# Patient Record
Sex: MALE | Race: WHITE | NOT HISPANIC OR LATINO | Employment: OTHER | URBAN - METROPOLITAN AREA
[De-identification: names, ages, dates, MRNs, and addresses within clinical notes are randomized per-mention and may not be internally consistent; named-entity substitution may affect disease eponyms.]

---

## 2017-05-04 ENCOUNTER — OFFICE VISIT (OUTPATIENT)
Dept: FAMILY MEDICINE | Facility: CLINIC | Age: 82
End: 2017-05-04
Payer: MEDICARE

## 2017-05-04 VITALS
SYSTOLIC BLOOD PRESSURE: 160 MMHG | HEART RATE: 73 BPM | TEMPERATURE: 98.1 F | WEIGHT: 166 LBS | BODY MASS INDEX: 26.06 KG/M2 | DIASTOLIC BLOOD PRESSURE: 81 MMHG | OXYGEN SATURATION: 97 % | HEIGHT: 67 IN

## 2017-05-04 DIAGNOSIS — R29.6 RECURRENT FALLS: ICD-10-CM

## 2017-05-04 DIAGNOSIS — Z00.00 ENCOUNTER FOR ROUTINE ADULT HEALTH EXAMINATION WITHOUT ABNORMAL FINDINGS: ICD-10-CM

## 2017-05-04 DIAGNOSIS — J31.0 CHRONIC RHINITIS: ICD-10-CM

## 2017-05-04 DIAGNOSIS — I10 ESSENTIAL HYPERTENSION, BENIGN: Primary | ICD-10-CM

## 2017-05-04 LAB
BASOPHILS # BLD AUTO: 0 10E9/L (ref 0–0.2)
BASOPHILS NFR BLD AUTO: 0.5 %
DIFFERENTIAL METHOD BLD: ABNORMAL
EOSINOPHIL # BLD AUTO: 0.1 10E9/L (ref 0–0.7)
EOSINOPHIL NFR BLD AUTO: 0.9 %
ERYTHROCYTE [DISTWIDTH] IN BLOOD BY AUTOMATED COUNT: 13.8 % (ref 10–15)
HCT VFR BLD AUTO: 37.3 % (ref 40–53)
HGB BLD-MCNC: 12 G/DL (ref 13.3–17.7)
LYMPHOCYTES # BLD AUTO: 1.4 10E9/L (ref 0.8–5.3)
LYMPHOCYTES NFR BLD AUTO: 24.2 %
MCH RBC QN AUTO: 29.4 PG (ref 26.5–33)
MCHC RBC AUTO-ENTMCNC: 32.2 G/DL (ref 31.5–36.5)
MCV RBC AUTO: 91 FL (ref 78–100)
MONOCYTES # BLD AUTO: 0.7 10E9/L (ref 0–1.3)
MONOCYTES NFR BLD AUTO: 11.9 %
NEUTROPHILS # BLD AUTO: 3.6 10E9/L (ref 1.6–8.3)
NEUTROPHILS NFR BLD AUTO: 62.5 %
PLATELET # BLD AUTO: 249 10E9/L (ref 150–450)
RBC # BLD AUTO: 4.08 10E12/L (ref 4.4–5.9)
WBC # BLD AUTO: 5.7 10E9/L (ref 4–11)

## 2017-05-04 PROCEDURE — 85025 COMPLETE CBC W/AUTO DIFF WBC: CPT | Performed by: PREVENTIVE MEDICINE

## 2017-05-04 PROCEDURE — G0439 PPPS, SUBSEQ VISIT: HCPCS | Performed by: PREVENTIVE MEDICINE

## 2017-05-04 PROCEDURE — 80053 COMPREHEN METABOLIC PANEL: CPT | Performed by: PREVENTIVE MEDICINE

## 2017-05-04 PROCEDURE — 36415 COLL VENOUS BLD VENIPUNCTURE: CPT | Performed by: PREVENTIVE MEDICINE

## 2017-05-04 RX ORDER — CHOLECALCIFEROL (VITAMIN D3) 50 MCG
2000 TABLET ORAL DAILY
Qty: 100 TABLET | Refills: 3
Start: 2017-05-04 | End: 2022-01-01

## 2017-05-04 RX ORDER — FLUTICASONE PROPIONATE 50 MCG
2 SPRAY, SUSPENSION (ML) NASAL DAILY
Qty: 1 BOTTLE | Refills: 1 | Status: SHIPPED | OUTPATIENT
Start: 2017-05-04 | End: 2018-06-28

## 2017-05-04 NOTE — PROGRESS NOTES
SUBJECTIVE:                                                            Ernie Leary is a 89 year old male who presents for Preventive Visit.      Are you in the first 12 months of your Medicare Part B coverage?  No    Healthy Habits:    Do you get at least three servings of calcium containing foods daily (dairy, green leafy vegetables, etc.)? no, taking calcium and/or vitamin D supplement: yes -     Amount of exercise or daily activities, outside of work: 5 day(s) per week    Problems taking medications regularly Yes , has trouble swallowing    Medication side effects: No    Have you had an eye exam in the past two years? yes    Do you see a dentist twice per year? yes    Do you have sleep apnea, excessive snoring or daytime drowsiness?no    COGNITIVE SCREEN  1) Repeat 3 items (Banana, Sunrise, Chair)    2) Clock draw: NORMAL  3) 3 item recall: Recalls 3 objects  Results: NORMAL clock, 1-2 items recalled: COGNITIVE IMPAIRMENT LESS LIKELY    Mini-CogTM Copyright S Luke. Licensed by the author for use in [facility]; reprinted with permission (glenroy@[facility]). All rights reserved.                Reviewed and updated as needed this visit by clinical staff         Reviewed and updated as needed this visit by Provider        Social History   Substance Use Topics     Smoking status: Former Smoker     Packs/day: 2.00     Years: 30.00     Types: Cigarettes     Quit date: 1/1/1981     Smokeless tobacco: Never Used     Alcohol use No       The patient does not drink >3 drinks per day nor >7 drinks per week.    Today's PHQ-2 Score:   PHQ-2 ( 1999 Pf[employer]) 5/14/2015 4/30/2015   Q1: Little interest or pleasure in doing things 0 0   Q2: Feeling down, depressed or hopeless 0 0   PHQ-2 Score 0 0       Do you feel safe in your environment - Yes    Do you have a Health Care Directive?: Yes: Advance Directive has been received and scanned.    Current providers sharing in care for this patient include:   Patient Care  "Team:  Jacob Herr MD as PCP - General (Internal Medicine)  Norma Mcbride MD as MD (Internal Medicine)      Hearing impairment: Yes, Difficulty following a conversation in a noisy restaurant or crowded room.    Ability to successfully perform activities of daily living: Yes, no assistance needed     Fall risk:       Home safety:  none identified  Cassie Shipley MA      The following health maintenance items are reviewed in Epic and correct as of today:  Health Maintenance   Topic Date Due     LIPID MONITORING Q1 YEAR( NO INBASKET)  08/15/2017     INFLUENZA VACCINE (SYSTEM ASSIGNED)  09/01/2017     FALL RISK ASSESSMENT  09/19/2017     ADVANCE DIRECTIVE PLANNING Q5 YRS (NO INBASKET)  03/29/2018     TETANUS Q10 YR  08/23/2022     PNEUMO 5YR BOOST DUE AFTER AGE 65 (NO IB MSG)  Completed     PNEUMOCOCCAL  Completed              ROS:  Constitutional, HEENT, cardiovascular, pulmonary, GI, , musculoskeletal, neuro, skin, endocrine and psych systems are negative, except as otherwise noted.    Problem list, Medication list, Allergies, and Medical/Social/Surgical histories reviewed in [facility] and updated as appropriate.  OBJECTIVE:                                                            There were no vitals taken for this visit. Estimated body mass index is 25.97 kg/(m^2) as calculated from the following:    Height as of 9/19/16: 5' 7\" (1.702 m).    Weight as of 9/19/16: 165 lb 12.8 oz (75.2 kg).  EXAM:   GENERAL: healthy, alert and no distress  EYES: Eyes grossly normal to inspection, PERRL and conjunctivae and sclerae normal  HENT: ear canals and TM's normal, nose and mouth without ulcers or lesions  NECK: no adenopathy, no asymmetry, masses, or scars and thyroid normal to palpation  RESP: lungs clear to auscultation - no rales, rhonchi or wheezes  CV: regular rate and rhythm, normal S1 S2, no S3 or S4, no murmur, click or rub, no peripheral edema and peripheral pulses strong  ABDOMEN: soft, " "nontender, no hepatosplenomegaly, no masses and bowel sounds normal  MS: no gross musculoskeletal defects noted, no edema  SKIN: no suspicious lesions or rashes  NEURO: Normal strength and tone, mentation intact and speech normal  PSYCH: mentation appears normal, affect normal/bright    ASSESSMENT / PLAN:                                                            1. Essential hypertension, benign      2. Encounter for routine adult health examination without abnormal findings    - CBC with platelets and differential  - Comprehensive metabolic panel    3. Recurrent falls    - Cholecalciferol (VITAMIN D) 2000 UNITS tablet; Take 2,000 Units by mouth daily  Dispense: 100 tablet; Refill: 3    4. Chronic rhinitis    - fluticasone (FLONASE) 50 MCG/ACT spray; Spray 2 sprays into both nostrils daily  Dispense: 1 Bottle; Refill: 1    End of Life Planning:  Patient currently has an advanced directive: Yes.  Practitioner is supportive of decision.    COUNSELING:  Reviewed preventive health counseling, as reflected in patient instructions       Regular exercise       Healthy diet/nutrition       Vision screening       Hearing screening       Dental care       Safe sex practices/STD prevention       Hepatitis C screening       HIV screening for high risk patient       Consider lung cancer screening for ages 55-80 years and 30 pack-year smoking history        Colon cancer screening       Prostate cancer screening        Estimated body mass index is 25.97 kg/(m^2) as calculated from the following:    Height as of 9/19/16: 5' 7\" (1.702 m).    Weight as of 9/19/16: 165 lb 12.8 oz (75.2 kg).     reports that he quit smoking about 36 years ago. His smoking use included Cigarettes. He has a 60.00 pack-year smoking history. He has never used smokeless tobacco.      Appropriate preventive services were discussed with this patient, including applicable screening as appropriate for cardiovascular disease, diabetes, osteopenia/osteoporosis, " and glaucoma.  As appropriate for age/gender, discussed screening for colorectal cancer, prostate cancer, breast cancer, and cervical cancer. Checklist reviewing preventive services available has been given to the patient.    Reviewed patients plan of care and provided an AVS. The Basic Care Plan (routine screening as documented in Health Maintenance) for Ernie meets the Care Plan requirement. This Care Plan has been established and reviewed with the Patient.    Counseling Resources:  ATP IV Guidelines  Pooled Cohorts Equation Calculator  Breast Cancer Risk Calculator  FRAX Risk Assessment  ICSI Preventive Guidelines  Dietary Guidelines for Americans, 2010  USDA's MyPlate  ASA Prophylaxis  Lung CA Screening    Jacob Herr MD, MD  [facility]

## 2017-05-04 NOTE — MR AVS SNAPSHOT
After Visit Summary   5/4/2017    Ernie Leary    MRN: [id number]           Patient Information     Date Of Birth          9/23/1924        Visit Information        Provider Department      5/4/2017 1:30 PM Jacob Herr MD [facility] Instructions      Preventive Health Recommendations:       Male Ages 65 and over    Yearly exam:             See your health care provider every year in order to  o   Review health changes.   o   Discuss preventive care.    o   Review your medicines if your doctor has prescribed any.    Talk with your health care provider about whether you should have a test to screen for prostate cancer (PSA).    Every 3 years, have a diabetes test (fasting glucose). If you are at risk for diabetes, you should have this test more often.    Every 5 years, have a cholesterol test. Have this test more often if you are at risk for high cholesterol or heart disease.     Every 10 years, have a colonoscopy. Or, have a yearly FIT test (stool test). These exams will check for colon cancer.    Talk to with your health care provider about screening for Abdominal Aortic Aneurysm if you have a family history of AAA or have a history of smoking.  Shots:     Get a flu shot each year.     Get a tetanus shot every 10 years.     Talk to your doctor about your pneumonia vaccines. There are now two you should receive - Pneumovax (PPSV 23) and Prevnar (PCV 13).    Talk to your doctor about a shingles vaccine.     Talk to your doctor about the hepatitis B vaccine.  Nutrition:     Eat at least 5 servings of fruits and vegetables each day.     Eat whole-grain bread, whole-wheat pasta and brown rice instead of white grains and rice.     Talk to your doctor about Calcium and Vitamin D.   Lifestyle    Exercise for at least 150 minutes a week (30 minutes a day, 5 days a week). This will help you control your weight and prevent disease.     Limit alcohol to one drink per  "day.     No smoking.     Wear sunscreen to prevent skin cancer.     See your dentist every six months for an exam and cleaning.     See your eye doctor every 1 to 2 years to screen for conditions such as glaucoma, macular degeneration and cataracts.        Follow-ups after your visit        Who to contact     If you have questions or need follow up information about today's clinic visit or your schedule please contact [facility] directly at [phone].  Normal or non-critical lab and imaging results will be communicated to you by [employer], letter or phone within 4 business days after the clinic has received the results. If you do not hear from us within 7 days, please contact the clinic through [employer] or phone. If you have a critical or abnormal lab result, we will notify you by phone as soon as possible.  Submit refill requests through [employer] or call your pharmacy and they will forward the refill request to us. Please allow 3 business days for your refill to be completed.          Additional Information About Your Visit        Compiere Information     [employer]iere gives you secure access to your electronic health record. If you see a primary care provider, you can also send messages to your care team and make appointments. If you have questions, please call your primary care clinic.  If you do not have a primary care provider, please call [phone] and they will assist you.        Care EveryWhere ID     This is your Care EveryWhere ID. This could be used by other organizations to access your [facility] medical records  [id number]        Your Vitals Were     Pulse Temperature Height Pulse Oximetry BMI (Body Mass Index)       73 98.1  F (36.7  C) (Tympanic) 5' 7\" (1.702 m) 97% 26 kg/m2        Blood Pressure from Last 3 Encounters:   05/04/17 160/81   09/19/16 131/73   05/29/15 119/71    Weight from Last 3 Encounters:   05/04/17 166 lb (75.3 kg)   09/19/16 165 lb 12.8 oz (75.2 kg)   05/29/15 167 lb (75.8 " kg)              Today, you had the following     No orders found for display       Primary Care Provider Office Phone # Fax #    Jacob Santa Isai Herr [phone]       [facility] [address]  [facility] [address]        Thank you!     Thank you for choosing [facility]  for your care. Our goal is always to provide you with excellent care. Hearing back from our patients is one way we can continue to improve our services. Please take a few minutes to complete the written survey that you may receive in the mail after your visit with us. Thank you!             Your Updated Medication List - Protect others around you: Learn how to safely use, store and throw away your medicines at www.disposemymeds.org.          This list is accurate as of: 5/4/17  1:47 PM.  Always use your most recent med list.                   Brand Name Dispense Instructions for use    amLODIPine 2.5 MG tablet    NORVASC    180 tablet    Take 1 tablet (2.5 mg) by mouth 2 times daily       calcium 600 MG tablet     180 tablet    Take 1 tablet by mouth 2 times daily.       calcium carbonate 500 MG chewable tablet    TUMS     Take 1 chew tab by mouth daily as needed.       CYANOCOBALAMIN PO      Take 1,000 mcg by mouth daily.       fosinopril 40 MG tablet    MONOPRIL    135 tablet    take 1/2 tablet by mouth in the morning and 1 tab in the evening       magnesium hydroxide 400 MG/5ML suspension    MILK OF MAGNESIA     Take 30 mLs by mouth daily as needed. Constipation.       metoprolol 50 MG tablet    LOPRESSOR    90 tablet    Take one-half tablet by mouth twice daily       prune juice Liqd      Take  by mouth daily (with breakfast).       psyllium 58.6 % Powd    METAMUCIL     Take 1 teaspoonful by mouth 3 times daily as needed. Constipation.       VITAMIN D3 PO      Take 1,000 Units by mouth daily.

## 2017-05-04 NOTE — NURSING NOTE
"No chief complaint on file.      Initial /81 (BP Location: Right arm, Cuff Size: Adult Regular)  Pulse 73  Temp 98.1  F (36.7  C) (Tympanic)  Ht 5' 7\" (1.702 m)  Wt 166 lb (75.3 kg)  SpO2 97%  BMI 26 kg/m2 Estimated body mass index is 26 kg/(m^2) as calculated from the following:    Height as of this encounter: 5' 7\" (1.702 m).    Weight as of this encounter: 166 lb (75.3 kg).  Medication Reconciliation: complete     Cassei Shipley MA    "

## 2017-05-05 LAB
ALBUMIN SERPL-MCNC: 4.1 G/DL (ref 3.4–5)
ALP SERPL-CCNC: 72 U/L (ref 40–150)
ALT SERPL W P-5'-P-CCNC: 19 U/L (ref 0–70)
ANION GAP SERPL CALCULATED.3IONS-SCNC: 8 MMOL/L (ref 3–14)
AST SERPL W P-5'-P-CCNC: 17 U/L (ref 0–45)
BILIRUB SERPL-MCNC: 0.5 MG/DL (ref 0.2–1.3)
BUN SERPL-MCNC: 24 MG/DL (ref 7–30)
CALCIUM SERPL-MCNC: 9.2 MG/DL (ref 8.5–10.1)
CHLORIDE SERPL-SCNC: 103 MMOL/L (ref 94–109)
CO2 SERPL-SCNC: 26 MMOL/L (ref 20–32)
CREAT SERPL-MCNC: 1.21 MG/DL (ref 0.66–1.25)
GFR SERPL CREATININE-BSD FRML MDRD: 56 ML/MIN/1.7M2
GLUCOSE SERPL-MCNC: 157 MG/DL (ref 70–99)
POTASSIUM SERPL-SCNC: 4.5 MMOL/L (ref 3.4–5.3)
PROT SERPL-MCNC: 7 G/DL (ref 6.8–8.8)
SODIUM SERPL-SCNC: 137 MMOL/L (ref 133–144)

## 2017-06-15 ENCOUNTER — TELEPHONE (OUTPATIENT)
Dept: FAMILY MEDICINE | Facility: CLINIC | Age: 82
End: 2017-06-15

## 2017-06-15 NOTE — TELEPHONE ENCOUNTER
Reason for Call:  Other appointment    Detailed comments: Needs preop on 07/21/17 is establishing care with Dr Ashraf in   September (Dr. Herr pt).  Can we work in on the 21st?  Surgery is 07/24/17    Phone Number Patient can be reached at: Home number on file [phone] (home)    Best Time: anytime    Can we leave a detailed message on this number? YES    Call taken on 6/15/2017 at 2:50 PM by Elena Carrillo

## 2017-06-26 ENCOUNTER — APPOINTMENT (OUTPATIENT)
Dept: MRI IMAGING | Facility: CLINIC | Age: 82
End: 2017-06-26
Attending: EMERGENCY MEDICINE
Payer: MEDICARE

## 2017-06-26 ENCOUNTER — APPOINTMENT (OUTPATIENT)
Dept: ULTRASOUND IMAGING | Facility: CLINIC | Age: 82
End: 2017-06-26
Attending: PSYCHIATRY & NEUROLOGY
Payer: MEDICARE

## 2017-06-26 ENCOUNTER — HOSPITAL ENCOUNTER (OUTPATIENT)
Facility: CLINIC | Age: 82
Setting detail: OBSERVATION
Discharge: HOME | End: 2017-06-27
Attending: EMERGENCY MEDICINE | Admitting: INTERNAL MEDICINE
Payer: MEDICARE

## 2017-06-26 DIAGNOSIS — Z91.81 RISK FOR FALLS: ICD-10-CM

## 2017-06-26 DIAGNOSIS — R20.2 ARM PARESTHESIA, LEFT: ICD-10-CM

## 2017-06-26 DIAGNOSIS — R25.1 SHAKINESS: ICD-10-CM

## 2017-06-26 DIAGNOSIS — Z86.79 HISTORY OF INTRACRANIAL HEMORRHAGE: ICD-10-CM

## 2017-06-26 DIAGNOSIS — I68.0 CEREBRAL AMYLOID ANGIOPATHY (H): ICD-10-CM

## 2017-06-26 DIAGNOSIS — G40.909 SEIZURE DISORDER (H): Primary | ICD-10-CM

## 2017-06-26 DIAGNOSIS — E85.4 CEREBRAL AMYLOID ANGIOPATHY (H): ICD-10-CM

## 2017-06-26 PROBLEM — G45.9 TIA (TRANSIENT ISCHEMIC ATTACK): Status: ACTIVE | Noted: 2017-06-26

## 2017-06-26 LAB
ALBUMIN SERPL-MCNC: 3.7 G/DL (ref 3.4–5)
ALP SERPL-CCNC: 63 U/L (ref 40–150)
ALT SERPL W P-5'-P-CCNC: 18 U/L (ref 0–70)
ANION GAP SERPL CALCULATED.3IONS-SCNC: 9 MMOL/L (ref 3–14)
AST SERPL W P-5'-P-CCNC: 15 U/L (ref 0–45)
BASOPHILS # BLD AUTO: 0.1 10E9/L (ref 0–0.2)
BASOPHILS NFR BLD AUTO: 1.1 %
BILIRUB SERPL-MCNC: 0.6 MG/DL (ref 0.2–1.3)
BUN SERPL-MCNC: 15 MG/DL (ref 7–30)
CALCIUM SERPL-MCNC: 8.6 MG/DL (ref 8.5–10.1)
CHLORIDE SERPL-SCNC: 101 MMOL/L (ref 94–109)
CHOLEST SERPL-MCNC: 177 MG/DL
CO2 SERPL-SCNC: 25 MMOL/L (ref 20–32)
CREAT SERPL-MCNC: 0.95 MG/DL (ref 0.66–1.25)
DIFFERENTIAL METHOD BLD: ABNORMAL
EOSINOPHIL # BLD AUTO: 0 10E9/L (ref 0–0.7)
EOSINOPHIL NFR BLD AUTO: 0.9 %
ERYTHROCYTE [DISTWIDTH] IN BLOOD BY AUTOMATED COUNT: 13.7 % (ref 10–15)
GFR SERPL CREATININE-BSD FRML MDRD: 74 ML/MIN/1.7M2
GLUCOSE BLDC GLUCOMTR-MCNC: 129 MG/DL (ref 70–99)
GLUCOSE BLDC GLUCOMTR-MCNC: 187 MG/DL (ref 70–99)
GLUCOSE SERPL-MCNC: 168 MG/DL (ref 70–99)
HBA1C MFR BLD: 6.5 % (ref 4.3–6)
HCT VFR BLD AUTO: 37.9 % (ref 40–53)
HDLC SERPL-MCNC: 42 MG/DL
HGB BLD-MCNC: 12.7 G/DL (ref 13.3–17.7)
IMM GRANULOCYTES # BLD: 0 10E9/L (ref 0–0.4)
IMM GRANULOCYTES NFR BLD: 0.2 %
INTERPRETATION ECG - MUSE: NORMAL
LDLC SERPL CALC-MCNC: 123 MG/DL
LYMPHOCYTES # BLD AUTO: 1 10E9/L (ref 0.8–5.3)
LYMPHOCYTES NFR BLD AUTO: 20.7 %
MCH RBC QN AUTO: 29.8 PG (ref 26.5–33)
MCHC RBC AUTO-ENTMCNC: 33.5 G/DL (ref 31.5–36.5)
MCV RBC AUTO: 89 FL (ref 78–100)
MONOCYTES # BLD AUTO: 0.5 10E9/L (ref 0–1.3)
MONOCYTES NFR BLD AUTO: 11.1 %
NEUTROPHILS # BLD AUTO: 3.1 10E9/L (ref 1.6–8.3)
NEUTROPHILS NFR BLD AUTO: 66 %
NONHDLC SERPL-MCNC: 135 MG/DL
NRBC # BLD AUTO: 0 10*3/UL
NRBC BLD AUTO-RTO: 0 /100
PLATELET # BLD AUTO: 207 10E9/L (ref 150–450)
POTASSIUM SERPL-SCNC: 3.7 MMOL/L (ref 3.4–5.3)
PROT SERPL-MCNC: 6.7 G/DL (ref 6.8–8.8)
RBC # BLD AUTO: 4.26 10E12/L (ref 4.4–5.9)
SODIUM SERPL-SCNC: 135 MMOL/L (ref 133–144)
TRIGL SERPL-MCNC: 58 MG/DL
TSH SERPL DL<=0.005 MIU/L-ACNC: 1.18 MU/L (ref 0.4–4)
WBC # BLD AUTO: 4.7 10E9/L (ref 4–11)

## 2017-06-26 PROCEDURE — 85025 COMPLETE CBC W/AUTO DIFF WBC: CPT | Performed by: EMERGENCY MEDICINE

## 2017-06-26 PROCEDURE — 80061 LIPID PANEL: CPT | Performed by: EMERGENCY MEDICINE

## 2017-06-26 PROCEDURE — A9585 GADOBUTROL INJECTION: HCPCS | Performed by: EMERGENCY MEDICINE

## 2017-06-26 PROCEDURE — 70553 MRI BRAIN STEM W/O & W/DYE: CPT

## 2017-06-26 PROCEDURE — 80053 COMPREHEN METABOLIC PANEL: CPT | Performed by: EMERGENCY MEDICINE

## 2017-06-26 PROCEDURE — 93005 ELECTROCARDIOGRAM TRACING: CPT

## 2017-06-26 PROCEDURE — 25000132 ZZH RX MED GY IP 250 OP 250 PS 637: Mod: GY | Performed by: PHYSICIAN ASSISTANT

## 2017-06-26 PROCEDURE — 00000146 ZZHCL STATISTIC GLUCOSE BY METER IP

## 2017-06-26 PROCEDURE — 25000132 ZZH RX MED GY IP 250 OP 250 PS 637: Mod: GY | Performed by: PSYCHIATRY & NEUROLOGY

## 2017-06-26 PROCEDURE — A9270 NON-COVERED ITEM OR SERVICE: HCPCS | Mod: GY | Performed by: PHYSICIAN ASSISTANT

## 2017-06-26 PROCEDURE — 83036 HEMOGLOBIN GLYCOSYLATED A1C: CPT | Performed by: EMERGENCY MEDICINE

## 2017-06-26 PROCEDURE — G0378 HOSPITAL OBSERVATION PER HR: HCPCS

## 2017-06-26 PROCEDURE — 25000132 ZZH RX MED GY IP 250 OP 250 PS 637: Mod: GY | Performed by: EMERGENCY MEDICINE

## 2017-06-26 PROCEDURE — 99220 ZZC INITIAL OBSERVATION CARE,LEVL III: CPT | Performed by: INTERNAL MEDICINE

## 2017-06-26 PROCEDURE — A9270 NON-COVERED ITEM OR SERVICE: HCPCS | Mod: GY | Performed by: PSYCHIATRY & NEUROLOGY

## 2017-06-26 PROCEDURE — 82306 VITAMIN D 25 HYDROXY: CPT | Performed by: EMERGENCY MEDICINE

## 2017-06-26 PROCEDURE — 25000128 H RX IP 250 OP 636: Performed by: EMERGENCY MEDICINE

## 2017-06-26 PROCEDURE — 99207 ZZC APP CREDIT; MD BILLING SHARED VISIT: CPT | Performed by: PHYSICIAN ASSISTANT

## 2017-06-26 PROCEDURE — A9270 NON-COVERED ITEM OR SERVICE: HCPCS | Mod: GY | Performed by: EMERGENCY MEDICINE

## 2017-06-26 PROCEDURE — 99285 EMERGENCY DEPT VISIT HI MDM: CPT | Mod: 25

## 2017-06-26 PROCEDURE — 84443 ASSAY THYROID STIM HORMONE: CPT | Performed by: EMERGENCY MEDICINE

## 2017-06-26 PROCEDURE — 93880 EXTRACRANIAL BILAT STUDY: CPT

## 2017-06-26 RX ORDER — POLYETHYLENE GLYCOL 3350 17 G/17G
17 POWDER, FOR SOLUTION ORAL DAILY PRN
Status: DISCONTINUED | OUTPATIENT
Start: 2017-06-26 | End: 2017-06-27 | Stop reason: HOSPADM

## 2017-06-26 RX ORDER — BISACODYL 10 MG
10 SUPPOSITORY, RECTAL RECTAL DAILY PRN
Status: DISCONTINUED | OUTPATIENT
Start: 2017-06-26 | End: 2017-06-27 | Stop reason: HOSPADM

## 2017-06-26 RX ORDER — NALOXONE HYDROCHLORIDE 0.4 MG/ML
.1-.4 INJECTION, SOLUTION INTRAMUSCULAR; INTRAVENOUS; SUBCUTANEOUS
Status: DISCONTINUED | OUTPATIENT
Start: 2017-06-26 | End: 2017-06-27 | Stop reason: HOSPADM

## 2017-06-26 RX ORDER — ONDANSETRON 4 MG/1
4 TABLET, ORALLY DISINTEGRATING ORAL EVERY 6 HOURS PRN
Status: DISCONTINUED | OUTPATIENT
Start: 2017-06-26 | End: 2017-06-27 | Stop reason: HOSPADM

## 2017-06-26 RX ORDER — PROCHLORPERAZINE MALEATE 5 MG
5 TABLET ORAL EVERY 6 HOURS PRN
Status: DISCONTINUED | OUTPATIENT
Start: 2017-06-26 | End: 2017-06-27 | Stop reason: HOSPADM

## 2017-06-26 RX ORDER — HYDRALAZINE HYDROCHLORIDE 20 MG/ML
10 INJECTION INTRAMUSCULAR; INTRAVENOUS EVERY 4 HOURS PRN
Status: DISCONTINUED | OUTPATIENT
Start: 2017-06-26 | End: 2017-06-27 | Stop reason: HOSPADM

## 2017-06-26 RX ORDER — METOPROLOL TARTRATE 50 MG
50 TABLET ORAL 2 TIMES DAILY
Status: DISCONTINUED | OUTPATIENT
Start: 2017-06-26 | End: 2017-06-26 | Stop reason: CLARIF

## 2017-06-26 RX ORDER — PROCHLORPERAZINE 25 MG
12.5 SUPPOSITORY, RECTAL RECTAL EVERY 12 HOURS PRN
Status: DISCONTINUED | OUTPATIENT
Start: 2017-06-26 | End: 2017-06-27 | Stop reason: HOSPADM

## 2017-06-26 RX ORDER — FLUTICASONE PROPIONATE 50 MCG
2 SPRAY, SUSPENSION (ML) NASAL DAILY
Status: DISCONTINUED | OUTPATIENT
Start: 2017-06-26 | End: 2017-06-27 | Stop reason: HOSPADM

## 2017-06-26 RX ORDER — FOSINOPRIL SODIUM 10 MG/1
40 TABLET ORAL EVERY EVENING
Status: DISCONTINUED | OUTPATIENT
Start: 2017-06-26 | End: 2017-06-27 | Stop reason: HOSPADM

## 2017-06-26 RX ORDER — GADOBUTROL 604.72 MG/ML
7 INJECTION INTRAVENOUS ONCE
Status: COMPLETED | OUTPATIENT
Start: 2017-06-26 | End: 2017-06-26

## 2017-06-26 RX ORDER — AMOXICILLIN 250 MG
1-2 CAPSULE ORAL 2 TIMES DAILY PRN
Status: DISCONTINUED | OUTPATIENT
Start: 2017-06-26 | End: 2017-06-27 | Stop reason: HOSPADM

## 2017-06-26 RX ORDER — LEVETIRACETAM 500 MG/1
500 TABLET ORAL ONCE
Status: COMPLETED | OUTPATIENT
Start: 2017-06-26 | End: 2017-06-26

## 2017-06-26 RX ORDER — AMLODIPINE BESYLATE 2.5 MG/1
2.5 TABLET ORAL 2 TIMES DAILY
Status: DISCONTINUED | OUTPATIENT
Start: 2017-06-26 | End: 2017-06-27 | Stop reason: HOSPADM

## 2017-06-26 RX ORDER — LEVETIRACETAM 500 MG/1
500 TABLET ORAL 2 TIMES DAILY
Status: DISCONTINUED | OUTPATIENT
Start: 2017-06-26 | End: 2017-06-27 | Stop reason: HOSPADM

## 2017-06-26 RX ORDER — FOSINOPRIL SODIUM 10 MG/1
20 TABLET ORAL EVERY MORNING
Status: DISCONTINUED | OUTPATIENT
Start: 2017-06-27 | End: 2017-06-27 | Stop reason: HOSPADM

## 2017-06-26 RX ORDER — LEVETIRACETAM 500 MG/1
500 TABLET ORAL 2 TIMES DAILY
Qty: 60 TABLET | Refills: 0 | Status: SHIPPED | OUTPATIENT
Start: 2017-06-26 | End: 2017-06-27

## 2017-06-26 RX ORDER — OMEGA-3-ACID ETHYL ESTERS 1 G/1
1 CAPSULE, LIQUID FILLED ORAL DAILY
COMMUNITY
End: 2018-06-28

## 2017-06-26 RX ORDER — UBIDECARENONE 30 MG
100 CAPSULE ORAL
COMMUNITY
End: 2022-01-01

## 2017-06-26 RX ORDER — METOPROLOL TARTRATE 25 MG/1
25 TABLET, FILM COATED ORAL 2 TIMES DAILY
Status: DISCONTINUED | OUTPATIENT
Start: 2017-06-26 | End: 2017-06-27 | Stop reason: HOSPADM

## 2017-06-26 RX ORDER — ONDANSETRON 2 MG/ML
4 INJECTION INTRAMUSCULAR; INTRAVENOUS EVERY 6 HOURS PRN
Status: DISCONTINUED | OUTPATIENT
Start: 2017-06-26 | End: 2017-06-27 | Stop reason: HOSPADM

## 2017-06-26 RX ADMIN — METOPROLOL TARTRATE 50 MG: 50 TABLET, FILM COATED ORAL at 20:42

## 2017-06-26 RX ADMIN — AMLODIPINE BESYLATE 2.5 MG: 2.5 TABLET ORAL at 20:42

## 2017-06-26 RX ADMIN — LEVETIRACETAM 500 MG: 500 TABLET, FILM COATED ORAL at 20:42

## 2017-06-26 RX ADMIN — LEVETIRACETAM 500 MG: 500 TABLET, FILM COATED ORAL at 13:10

## 2017-06-26 RX ADMIN — GADOBUTROL 7 ML: 604.72 INJECTION INTRAVENOUS at 10:35

## 2017-06-26 RX ADMIN — FLUTICASONE PROPIONATE 2 SPRAY: 50 SPRAY, METERED NASAL at 20:42

## 2017-06-26 ASSESSMENT — VISUAL ACUITY
OU: NORMAL ACUITY
OU: NORMAL ACUITY

## 2017-06-26 NOTE — ED PROVIDER NOTES
History     Chief Complaint:  Left sided numbness and tingling    HPI   History is limited, as the patient is a poor historian. Supplemented by EPIC chart review.    Ernie Leary is a 89 year old male with a history of hypertension, atrial flutter, cerebral amyloid angiopathy and left sided hemorraghic stroke a few years ago who presents to the ED by ambulance for evaluation of left sided numbness and tingling.The patient reports he woke up this morning at 5am and his left hand felt crampy and a bit numb/tingling. The patient complains that the numbness is still present here in the ED but is greatly improved.  He states he had some left leg numbness as well, but that has resolved here in the ED.  He denies headache, confusion or visual changes. The patient's wife expresses concern for TIA or stroke.  The patient sees Dr. Alcantar and the patient's wife mentions he had a surveillance MRI within the past month.  The patient is not anticoagulated in any way and has had no recent trauma.    Allergies:  Adrenalin  Hctz     Medications:    Vitamin D  Flonase  Norvasc  Monopril  Lopressor  Vitamin D3  Cyanocobalamin  Metamucil  Milk of Magnesia  Tums  Calcium 600    Past Medical History:    Cerebral amyloid angiopathy  Left sided hemorraghic stroke (2014)   Hypertension  Atrial flutter  Diabetes  Sebaceous cyst  Syncope and collapse  Hyperlipidemia  Normocytic anemia  TIA  BPH    Past Surgical History:    Cardiac ablation  Herniorrhaphy inguinal  Cystoscopy  Arthrotomy shoulder  Skin biopsies    Family History:    No past pertinent family history.    Social History:  The patient was brought in by EMS and accompanied to the ED by his wife.  Smoking Status: Former- 2 packs daily for 30 yrs (quit 1981)  Smokeless Tobacco: Never  Alcohol Use: No  Marital Status:        Review of Systems   All other systems reviewed and are negative.    Physical Exam   First Vitals:  BP: 182/89  Temp: 98F (36.7C) Oral  Pulse: 77  HR:  77  Resp: 18    Physical Exam  General: male semi-recumbent in bed, wife at bedside  HENT: mucous membranes moist  CV: regular rate, regular rhythm, no LE edema, normal perfusion in extremities  Resp: clear throughout, normal effort  GI: abdomen soft and nontender, no guarding  MSK: no bony tenderness  Skin: appropriately warm and dry  Neuro: awake, alert, clear speech, fully oriented, face symmetric, CN 2-12 intact,  normal, finger-nose normal bilaterally, strength and sensation intact in all extr, no meningismus, ambulatory without ataxia, NIH SS = 0      Psych: calm, cooperative      Emergency Department Course   ECG done at 0909. ECG read at 0922. Indication: Numbness  Rate 71 bpm. VA interval 254. QRS duration 84. QT/QTc 386/419. P-R-T axes 53 10 8.  Sinus rhythm with 1st degree AV block.  Otherwise normal ECG.    Imaging:  Radiographic findings were communicated with the patient who voiced understanding of the findings.    MR Brain w/o & w Contrast:  IMPRESSION:   1. Abnormal enhancement in the right parietal lobe where there was a  previous hematoma in 2012. 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 Possible causes  include a vascular malformation such as cavernous hemangioma and  neoplasm. Follow-up exam in one to two months is advised.  2. 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  3. Brain atrophy and white matter changes consistent with sequelae of  small vessel ischemic disease.  Preliminary result, per Radiology    Laboratory:  CBC: WBC 4.7, HGB 12.7(L),   CMP: Glucose 168(H) o/w WNL (Creat 0.95)    Interventions:  1003 Gadavist injection 7mL  Keppra 500mg PO    Emergency Department Course:  Nursing notes and vitals reviewed.  I performed an exam of the patient as documented above.     1115 I rechecked the patient. He feels normal. I explained I am waiting for a neurology phone  "call.     The patient passed a \"road test\" prior to discharge from the ED.    1141 I spoke with Dr. Duff in Neurology regarding the patient.  She recommends initiation of Keppra 500mg bid.    1242 I rechecked the patient.    Findings and plan explained to the Patient who consents to admission. Discussed the patient with Dr. Barboza, who will admit the patient to a Neurology bed for further monitoring, evaluation, and treatment.    Impression & Plan    Medical Decision Making:  Ernie Leary is a 89 year old male who presents with transient neurologic symptoms in his left extremities, most marked in his left hand.  His symptoms were steadily improving upon arrival and have now resolved.  For this reason, I did not call a code stroke.  He has no objective acute deficits at this time.  MRI was performed and shows no new intracranial hemorrhage or ischemia. I did discuss his presentation and imaging findings with the on call Neurologist, Dr. Duff, who recommended reinitiation of Keppra of 500mg BID for the likelihood of partial seizures as the cause of his symptoms. A TIA also remains on the differential. No signs of contributory electrolyte abnormalities. At one point, the patient was actually set up for discharge home, but upon standing again having previously successful road tested in the ED, he felt shaky on both sides of his body and felt uneasy about going home.  I went back to reexamine him and discussed the situation with the patient and his wife.  His exam was unchanged.  He will be observed in the hospital overnight and I spoke with Dr. Jaki Barboza who graciously agreed to accept the patient after discussion of the above.    Diagnosis:  1. (R20.2) Arm paresthesia, left    2. (Z86.79) History of intracranial hemorrhage    3. (R25.1) Shakiness    4. (E85.4,  I68.0) Cerebral amyloid angiopathy (H)    Disposition:  The patient was admitted to the hospital.    6/26/2017    EMERGENCY DEPARTMENT    I, " Yvrose Quezada, am serving as a scribe at 0855 on June 26, 2017 to document services personally performed by Dr. Marie based on my observations and the provider's statements to me.         Louis Marie MD  06/26/17 1085

## 2017-06-26 NOTE — ED NOTES
Bed: ED03  Expected date:   Expected time:   Means of arrival:   Comments:  Jane 1 - 89 M stroke symptoms eta 0879

## 2017-06-26 NOTE — H&P
"DATE OF ADMISSION:  06/26/2017      PRIMARY CARE PHYSICIAN:  Jacob Herr MD      CHIEF COMPLAINT:  Left upper extremity cramping and shaking.      History obtained primarily from patient, although he is a poor historian; he is accompanied by family.  His wife just left to go home.  I also reviewed the chart.      HISTORY OF PRESENT ILLNESS:  Ernie Leary is a 89-year-old gentleman with past medical history of hypertension, diet-controlled type 2 diabetes, atrial fibrillation, status post ablation, BPH, SIADH, and history of right parietal intracerebral hemorrhage as well as amyloid angiopathy who presents to the emergency room via EMS earlier today after awakening with left upper extremity shaking and cramping.  The patient notes that he awoke at around 5:00 a.m. and felt that his hand was \"frozen.\"  He states that it began to cramp and was hard to move.  From his prior intracerebral hemorrhage in 11/2012, the patient does have some baseline numbness and tingling in his left upper extremity as well as some baseline weakness on that side; however, he felt that the shaking and the cramping was a new occurrence so he called EMS and was brought to [facility] Emergency Department.      In the ED, the patient was seen and assessed by Dr. Marie who obtained basic labs and imaging which revealed an unremarkable CMP.  Hemoglobin is 12.7.  MRI of the brain showed abnormal enhancement in the right parietal lobe where there was a previous hematoma in 2012.  This indicates persistent breakdown of the blood-brain barrier, but the etiology is nonspecific.  Possible causes include a vascular malformation such as a cavernous hemangioma and neoplasm.  Followup exam in 1-2 months is advised.  Also noted is encephalomalacia gliosis and paramagnetic effect in the right parietal lobe from old hemorrhage, brain atrophy and white matter changes consistent with sequela of small-vessel ischemic disease.  The MRI results and " "the presentation were discussed with Dr. Duff of Neurology who thought this could potentially represent a seizure-like activity as well.  The patient does have a history of seizure disorder with hospitalization in 2013 for further evaluation and treatment.  At that time the patient was initiated on Keppra; however, he took himself off of this and voices a strong desire that this not be restarted.      On my interview, the patient confirms the above history.  He denies any dysphasia, dizziness, lightheadedness, shortness of breath, chest pain, urinary symptoms, diarrhea or constipation.  The patient does endorse some ataxia earlier today but cannot identify one lower extremity being more weaker than the other.  With his prior stroke, he had left hemiparesis and a left visual field cut as well as impaired mobility, but per notes and per his report he regained most of his mobility since that time.  He walks independently without a walker or a cane.  He denies any falls or hitting his head today.  No bowel or bladder incontinence.  Since coming to the ED, he feels like his left upper extremity is less \"frozen\" but it still feels off.  He states that the cramping has improved but he still feels a little numbness.  I am unclear if this is at his baseline or worse than his baseline as he cannot definitively tell me.      REVIEW OF SYSTEMS:  A 10-point review of systems was performed and is otherwise negative unless specified in the HPI.      PAST MEDICAL HISTORY:   1.  Right parietal intracerebral hemorrhage in 11/2012 with subsequent left hemiparesis, left visual field cut and impaired mobility most of which has been regained.   2.  Seizure disorder related to above, not currently on any medications for this as he took himself off, initially diagnosed during hospitalization in 2013.   3.  Atrial fibrillation, status post ablation.   4.  Hypertension.   5.  Hyperlipidemia.   6.  Diet-controlled type 2 diabetes mellitus. "   7.  BPH.   8.  SIADH.      MEDICATIONS:    Prior to Admission Medications   Prescriptions Last Dose Informant Patient Reported? Taking?   CYANOCOBALAMIN PO  Spouse/Significant Other Yes No   Sig: Take 1,000 mcg by mouth daily.   Cholecalciferol (VITAMIN D) 2000 UNITS tablet   No No   Sig: Take 2,000 Units by mouth daily   Cholecalciferol (VITAMIN D3 PO)  Spouse/Significant Other Yes No   Sig: Take 1,000 Units by mouth daily.   amLODIPine (NORVASC) 2.5 MG tablet   No No   Sig: Take 1 tablet (2.5 mg) by mouth 2 times daily   calcium 600 MG tablet  Spouse/Significant Other Yes No   Sig: Take 1 tablet by mouth 2 times daily.   calcium carbonate (TUMS) 500 MG chewable tablet  Spouse/Significant Other Yes No   Sig: Take 1 chew tab by mouth daily as needed.   fluticasone (FLONASE) 50 MCG/ACT spray   No No   Sig: Spray 2 sprays into both nostrils daily   fosinopril (MONOPRIL) 40 MG tablet   No No   Sig: take 1/2 tablet by mouth in the morning and 1 tab in the evening   magnesium hydroxide (MILK OF MAGNESIA) 400 MG/5ML suspension  Spouse/Significant Other Yes No   Sig: Take 30 mLs by mouth daily as needed. Constipation.   metoprolol (LOPRESSOR) 50 MG tablet   No No   Sig: Take one-half tablet by mouth twice daily   prune juice LIQD  Spouse/Significant Other Yes No   Sig: Take  by mouth daily (with breakfast).   psyllium (METAMUCIL) 58.6 % POWD  Spouse/Significant Other Yes No   Sig: Take 1 teaspoonful by mouth 3 times daily as needed. Constipation.      Facility-Administered Medications: None      ALLERGIES:  Epinephrine and hydrochlorothiazide.      PAST SURGICAL HISTORY:   Past Surgical History:   Procedure Laterality Date     ------------OTHER-------------  1/1/2008    Cardiac ablation     ADJACENT TISSUE TRANSFER, FOREHEAD/CHEEKS/CHIN/MOUTH/NECK/AXILLAE/GENITALIA/HANDS/FEET, < OR = 10CM       ARTHROTOMY SHOULDER, ROTATOR CUFF REPAIR, COMBINED  11/18/2011    Procedure:COMBINED ARTHROTOMY SHOULDER, ROTATOR CUFF REPAIR;  LEFT SHOULDER OPEN ROTATOR CUFF REPAIR, BURSECTOMY, DISTAL CLAVICLE RESECTION; Surgeon:KHALIDA ZABALA; Location: OR     BIOPSY      multiple skin biopsies for skin cancers     CARDIAC SURGERY       CYSTOSCOPY, TRANSURETHRAL RESECTION (TUR) PROSTATE, COMBINED  8/15/2012    Procedure: COMBINED CYSTOSCOPY, TRANSURETHRAL RESECTION (TUR) PROSTATE;  CYSTOSCOPY, TRANSURETHRAL RESECTION  OF  PROSTATE WITH PLASMA LOOP;  Surgeon: Harmeet Kilgore MD;  Location:  OR     HERNIORRHAPHY INGUINAL Right 5/29/2015    Procedure: HERNIORRHAPHY INGUINAL;  Surgeon: Scottie Nunn MD;  Location:  [facility]      FAMILY HISTORY:  Mother with history of CVA at 99.  Father with MI at 86.      SOCIAL HISTORY:  The patient currently resides at a senior co-op, the [facility].  He recently moved there with his wife.  He has a 60-pack-year smoking history.  No alcohol use.  He does not use a cane or walker for ambulatory assistance.      LABORATORY DATA:  Reviewed per Epic, noted in the HPI.      IMAGING:  Noted in the HPI.      PHYSICAL EXAMINATION:   VITAL SIGNS:  T 98, P 61, /58, R 14, SpO2 of 94% on room air.   CONSTITUTIONAL:  The patient lying in bed, dressed in hospital garb.  Appears comfortable.  Cooperative with interview, speech is slowed.     HEENT:  Normocephalic, atraumatic.  Pupils equal, round and reactive to light.  Negative for conjunctival redness or scleral icterus.  Oral mucosa pink and moist; negative for ulcerations, erythema or exudates.   CARDIOVASCULAR:  Regular rate and rhythm; no murmurs, rubs or extra heart sounds appreciated.  Pulses +2/4 and regular in upper and lower extremities bilaterally.   RESPIRATORY:  No increased work of breathing.  No supplemental oxygen at this time.   LUNGS:  Clear to auscultation bilaterally; no wheezes, rales or rhonchi appreciated.   GASTROINTESTINAL:  Abdomen soft, nondistended.  Bowel sounds auscultated in all 4 quadrants.  Negative for tenderness to palpation.    MUSCULOSKELETAL:  No gross deformities noted.  Normal muscle tone.  Strength +4/5 in left upper extremity, 5/5 in right upper extremity.  Patient right-handed.   LYMPHATICS:  Negative for lower extremity edema bilaterally.   NEUROLOGIC:   strength intact.  Alert and oriented to person, place and time.  Cranial nerves 2-12 grossly intact.  Cerebellar function intact per rapid hand movement testing.  Sensation slightly diminished in left upper extremity compared to right upper extremity which patient reports is his baseline.   SKIN:  Warm, dry, intact.  No jaundice noted.  Negative for suspicious lesions, rashes, bruising, open sores or abrasions.      ASSESSMENT AND PLAN:  Ernie Leary is a 89-year-old gentleman with past medical history of right parietal intracerebral hemorrhage, amyloid angiopathy, seizure disorder, atrial fibrillation, status post ablation, not on anticoagulation, hyperlipidemia, diet-controlled type 2 diabetes mellitus, and hypertension who presents to the Emergency Department with left upper extremity cramping, stiffness, and shaking with concern for cerebrovascular accident.  MRI unremarkable for acute process at this time.  Admitted under observation to the neuro floor for further evaluation and treatment of possible transient ischemic attack  versus seizure-like activity.  Patient slightly hypertensive; however, has not taken PTA medications yet today.  Patient currently stable.     Left upper extremity shaking, cramping, and stiffness:  Patient with a very similar presentation and admission in 2013.  He states that he awoke at around 5:00 a.m. and felt that his left hand specifically was frozen and he had a cramping sensation and stiffness.  He also felt that his hand was shaking and had more profound numbness.  He has some baseline numbness in his left upper extremity from his prior intracerebral hemorrhage.  This persisted, which prompted him to call EMS.  Workup thus far showed an  unremarkable CMP and CBC.  MRI of the brain showed an abnormal enhancement in the right parietal lobe where there was a previous hematoma in 2012, which indicates persistent breakdown of the blood-brain barrier, but the etiology is nonspecific.  Possible causes include a vascular malformation such as cavernous hemangioma and neoplasm.  Followup exam in 1-2 months is advised.  Also, encephalomalacia gliosis and paramagnetic effect in the right parietal lobe from old hemorrhage.  Patient with history of amyloid angiopathy and right parietal intracerebral hemorrhage in 2012 with associated left hemiparesis, left visual field cut, and impaired mobility which has since been regained.  He also felt some increasing ataxia earlier today as well.  He has been diagnosed with a seizure disorder in the past relative to his prior intracerebral hemorrhage and was previously on Keppra, but took himself off of this and feels strongly about not restarting if there is no active evidence of a seizure.  In the ED, the case was presented to Dr. Duff of Neurology who had recommended reinitiation of Keppra 500 mg b.i.d. and discharge with followup with Neurology in the outpatient setting; however, the patient was unable to perform a roadside test and thus admission was sought.   -- Admit under observation to the neuro floor.   -- Neurology consulted; appreciate assistance.   -- Neuro checks per protocol.   -- Monitor on telemetry.   -- PT and OT to assess.   -- Patient ate a full sandwich and some orange juice in the Emergency Department prior to my interview without any issues with dysphasia, difficulties swallowing, or concern for aspiration by nursing staff, so we will therefore defer formal SLP consult.   -- CBC and BMP in the a.m.     Hypertension:  We will resume PTA amlodipine, Monopril, and metoprolol with parameters in place once verified by pharmacy.     Diet-controlled type 2 diabetes mellitus:  Patient not currently on any  medications.  The last A1c was from .  We will obtain an A1c at this time.     Hyperlipidemia:  Patient not maintained on a statin, defer further management to primary care provider.     Atrial fibrillation, status post ablation:  Patient not on anticoagulation at this time.  No active chest pain, shortness of breath or palpitations.  No recent syncopal events.   -- Monitor on telemetry.     Deep venous thrombosis prophylaxis:  Encourage ambulation.      CODE STATUS:  At this time patient will be listed as a full code, which is consistent with prior hospitalizations; however, we will have to revisit this conversation when patient's wife is present as the patient is not comfortable talking about this without her.  Per review of prior POLST filled out in , he had listed do not resuscitate, do not intubate.  He would like to elect full code until we can discuss with his wife.      This patient was seen and assessed with Dr. Li who agrees with the plan as outlined above.         CALEB LI MD       As dictated by OTILIO HUNG PA-C            D: 2017 14:10   T: 2017 15:11   MT: JAIRO      Name:     ALEX MCALLISTER   MRN:      [id number]        Account:      [id number]   :      1924           Admitted:     [id number]      Document: [id number]       cc: Jacob Herr MD

## 2017-06-26 NOTE — ED AVS SNAPSHOT
Emergency Department    [address] [facility] [address]    Phone:  [phone]    Fax:  [phone]                                       Ernie Leary   MRN: [id number]    Department:   Emergency Department   Date of Visit:  6/26/2017           After Visit Summary Signature Page     I have received my discharge instructions, and my questions have been answered. I have discussed any challenges I see with this plan with the nurse or doctor.    ..........................................................................................................................................  Patient/Patient Representative Signature      ..........................................................................................................................................  Patient Representative Print Name and Relationship to Patient    ..................................................               ................................................  Date                                            Time    ..........................................................................................................................................  Reviewed by Signature/Title    ...................................................              ..............................................  Date                                                            Time

## 2017-06-26 NOTE — CONSULTS
NEUROLOGY CONSULTATION      REASON FOR CONSULTATION:  Transient left upper extremity tightness, possible seizure.      HISTORY OF PRESENT ILLNESS:  Mr. Ernie Leary is a very pleasant, 89-year-old right-handed gentleman.  He had a right intracerebral hemorrhage in 2012 which left him with some left-sided weakness and mild cognitive changes.  He has had a very good recovery, although still has always been aware of some mild residual changes in terms of a little weakness and incoordination on his left side.  In 2013, he was admitted with an episode of left-sided tingling.  I had suspected possible seizure.  EEG was not conclusive.  He chose not to be on Keppra, although he had been on it prophylactically following his bleed.  This morning after he got up, he was aware of some left-sided hand cramping.  He describes his fingers actually clenching and feeling stiff and it was hard to unclench them.  It did not really affect his face or leg, although he did feel more unsteady with his walking on it on a transient basis.  There was no headache, no visual change, no chest pain or breathing difficulty.  Symptoms basically resolved in the Emergency Room.      He denies any recent cold or flu-like illnesses.  No significant recent medication changes.      PAST MEDICAL HISTORY:  He does have a history of a left cerebral hemorrhage suspicious for amyloid angiopathy.  Other medical problems include hypertension, atrial flutter, diabetes, hyperlipidemia, chronic anemia, BPH.      HOME MEDICATIONS:  Include vitamins, Flonase, Norvasc, Monopril, Lopressor, vitamin D, B12, Tums, calcium and some other vitamins and supplements.      ALLERGIES:  He has had adverse reactions to epinephrine and hydrochlorothiazide.      SOCIAL HISTORY:  He is a former smoker, but quit in 1981.  No alcohol use.  He lives at home with his wife.      REVIEW OF SYSTEMS:  Pertinent comprehensive system review is as noted above.      PHYSICAL EXAMINATION:  "  GENERAL:  He is resting comfortably in bed in no acute distress.   VITAL SIGNS:  /84, temp 97.5, pulse 77, respirations 14.   NEUROLOGIC:  He is alert and oriented.  His speech is reasonably fluent.  Extraocular movements are full, visual fields are grossly intact.  Fundi are not well seen.  Face, tongue and palate movement are symmetric.  Strength testing of the upper and lower extremities is pretty symmetric today.  Alternating motion rates of his left fingers and toes are equivocally mildly slower on the left side than right.  Sensation to joint position sense and pinprick are symmetric in the extremities.  Deep tendon reflexes are symmetric throughout and no Babinski signs are present.      DIAGNOSTIC DATA:  He did have an MRI of the brain which I reviewed with Dr. Carpio.  It does show his area of encephalomalacia in the right hemisphere.  There is some abnormal enhancement in the right parietal lobe in the area of his previous hematoma.  It was unclear as to the etiology of this.      IMPRESSION AND PLAN:  Transient left-sided neurological symptoms.  It sounds like this was more of a \"positive\" neurological phenomenon rather than negative and seizure would be a strong possibility.  At this point, I do not think we need to repeat an EEG because it is unlikely to change opinion.  I would like to check a carotid ultrasound study just to make sure there is no development of large vessel vascular disease.  We talked about the possibility of seizure and resuming Keppra.  He and his wife are okay getting back on the medicine.  We will go with 500 twice a day.  I did warn him about sedation and mood changes can also develop and need to be watched.  If the medication is overly sedating, we may back down to 250 b.i.d. and build it up.  I also did bring in CDs of brain imaging that he has had through our clinic over the last couple of years.  I did give them to Dr. Carpio and they will be compared to see if " there are any changes or anything of concern that needs to be monitored.  If he is clinically stable, he could potentially be discharged tomorrow.         AMEE CHINCHILLA MD             D: 2017 15:52   T: 2017 16:21   MT: RADHA      Name:     ALEX MCALLISTER   MRN:      [id number]        Account:       [id number]   :      1924           Consult Date:  2017      Document: [id number]

## 2017-06-26 NOTE — IP AVS SNAPSHOT
[address] [facility]    [facility] [address]    [place] [address]    Phone:  [phone]                                       After Visit Summary   6/26/2017    Ernie Leary    MRN: [id number]           After Visit Summary Signature Page     I have received my discharge instructions, and my questions have been answered. I have discussed any challenges I see with this plan with the nurse or doctor.    ..........................................................................................................................................  Patient/Patient Representative Signature      ..........................................................................................................................................  Patient Representative Print Name and Relationship to Patient    ..................................................               ................................................  Date                                            Time    ..........................................................................................................................................  Reviewed by Signature/Title    ...................................................              ..............................................  Date                                                            Time

## 2017-06-26 NOTE — ED NOTES
"Pt was prepared for discharged, had gotten dressed and PIV was removed and pt was attempting to stand from bed and get into the wheelchair and he became weak. Pt was shaking- boths arms and legs but he states his L hand was most tremerous. Pt states he felt too weak to walk and felt \"unwell.\" Pt was assisted back to bed and RN felt pt was not safe to go home and pt/wife agreed. Pt given courtesy meal and MD updated. Pt eating, blood sugar 129 and pt still stating \"i don't feel well. I feel weak and I am shaking.\"  Dr. Marie at bedside and RN. Pt is tearful and states he just does not feel well enough to go home. Plan is to admit pt   "

## 2017-06-26 NOTE — ED NOTES
"[facility]  ED Nurse Handoff Report    ED Chief complaint: One-sided Weakness (Pt reports he awoke this morning and at 0700 he noticed \"I lost control of my left hand.\" Pt reports his left hand was shaking, cramping and he was unable to move it. Pt states it also felt numb/tingling. Pt reports he regained movement/control in hand now but hand still feels a bit numb. Pt reports L leg numbness at the time too. Pt has equal  strength and movement in bilateral arms/legs. Pt denies HA or vision changes)      ED Diagnosis:   Final diagnoses:   Arm paresthesia, left   History of intracranial hemorrhage   Shakiness   Cerebral amyloid angiopathy (H)       Code Status: Hospitalist at bedside    Allergies:   Allergies   Allergen Reactions     Adrenalin [Epinephrine Hcl]      Hctz      hyponatremia       Activity level - Baseline/Home:  Independent    Activity Level - Current:   Stand with Assist     Needed?: No    Isolation: No  Infection: Not Applicable    Bariatric?: No    Vital Signs:   Vitals:    06/26/17 1114 06/26/17 1115 06/26/17 1116 06/26/17 1256   BP: (!) 172/94      Pulse:       Resp:   14    Temp:       TempSrc:       SpO2:  96% 96% 96%   Weight:           Cardiac Rhythm: ,        Pain level: 0-10 Pain Scale: 0    Is this patient confused?: No    Patient Report: Initial Complaint: L hand numbness/tingling and tremerous/spasming   Focused Assessment: Pt presents from apartment where he lives with wife and reports he awoke this morning at 0700, felt okay and then noticed that his L hand was cramping, tremerous and numb. Pt states \"my hand was frozen and I could not move it.\" Pt reported some numbness in his L leg as well but nothing significant. Wife stated pt was unsteady on his feet as well this morning so wife called EMS. By the time EMS had arrived to the scene pt stated his L hand was improved. Pt has hx of hemorrhagic stroke in 2012 and since has had TIAs vs seizures. Pt spent " many years in a rehab facility and now lives a pretty independent life. Neuro exam was normal except for pt stating his L hand did still feel a bit numb which throughout his stay did improve/resolve. Pt had MRI which was negative for stroke. Pt was preparing to be discharged when he was found to be unsteady on his feet, wobbly and nervous about going home. Pt became tearful and stated he was not well enough to go home. Pt states he never uses a cane or a walker but was unsteady when RN was trying to help him stand and then he would just sit right back down. Decision was made to keep pt in the hospital. Wife went home to bring pt's medications in. Pt is a/ox4 and VSS. Pt given 500 mg keppra (which we just restarted today per neuro)   Tests Performed: Blood work, MRI brain  Abnormal Results: Nothing remarkable  Treatments provided: PO keppra    Family Comments: Wife was present, will be returning    OBS brochure/video discussed/provided to patient: Yes    ED Medications:   Medications   gadobutrol (GADAVIST) injection 7 mL (7 mLs Intravenous Given 6/26/17 1035)   sodium chloride (PF) 0.9% PF flush 10 mL (10 mLs Intravenous Given 6/26/17 1035)   levETIRAcetam (KEPPRA) tablet 500 mg (500 mg Oral Given 6/26/17 1310)       Drips infusing?:  No      ED NURSE PHONE NUMBER: *[phone]

## 2017-06-26 NOTE — ED AVS SNAPSHOT
"  Emergency Department    [address] [facility] [address]    Phone:  [phone]    Fax:  [phone]                                       Ernie Leary   MRN: [id number]    Department:   Emergency Department   Date of Visit:  6/26/2017           Patient Information     Date Of Birth          9/23/1924        Your diagnoses for this visit were:     Arm paresthesia, left     History of intracranial hemorrhage        You were seen by Louis Marie MD.      Follow-up Information     Follow up with Tanner Alcantar MD. Schedule an appointment as soon as possible for a visit in 1 week.    Specialty:  Neurology    Why:  for recheck in clinic by Neurologist    Contact information:    [facility]  [address] [id number] [phone]          Follow up with  Emergency Department.    Specialty:  EMERGENCY MEDICINE    Why:  As needed, If symptoms worsen    Contact information:    [address] [facility] [address] [phone]        Discharge Instructions         Paraesthesias  It is unclear what caused the abnormal sensations in your left arm and hand, though it is possible that you had a partial seizure.  The Neurologist recommends that you resume Keppra and follow up soon in clinic for re-evaluation.  Paraesthesia is a burning or prickling sensation that is sometimes felt in the hands, arms, legs or feet. It can also occur in other parts of the body. It can also feel like tingling or numbness, skin crawling, or itching. The feeling is not comfortable, but it is not painful. (The \"pins and needles\" feeling that happens when a foot or hand \"falls asleep\" is a temporary paraesthesia.)  Paraesthesias that last or come and go may be caused by medical issues that need to be treated. These include stroke, a bulging disk pressing on a nerve, a trapped nerve, vitamin deficiencies, or even certain medicines.  Tests are often done. These tests " may include blood tests, X-ray, CT (computerized tomography) scan, or a muscle test (electromyography). Depending on the cause, treatment may include physical therapy.  Home care    Tell the healthcare provider about all medicines you take. This includes prescription and over-the-counter medicines, vitamins, and herbs. Ask if any of the medicines may be causing your problems. Do not make any changes to prescription medicines without talking to your healthcare provider first.    You may be prescribed medicines to help relieve the tingling feeling or for pain. Take all medicines as directed.    A numb hand or foot may be more prone to injury. To help protect it:    Always use oven mitts.    Test water with an unaffected hand or foot.    Use caution when trimming nails. File sharp areas.    Wear shoes that fit well to avoid pressure points, blisters, and ulcers.    Inspect your hands and feet carefully (including the soles of your feet and between your toes) at least once a week. If you see red areas, sores, or other problems, tell your healthcare provider.  Follow-up care  Follow up with your doctor or as advised by our staff. You may need further testing or evaluation.  When to seek medical advice  Call your healthcare provider right away if any of the following occur:    Numbness or weakness of the face, one arm, or one leg    Slurred speech, confusion, trouble speaking, walking, or seeing    Severe headache, fainting spell, dizziness, or seizure    Chest, arm, neck, or upper back pain    Loss of bladder or bowel control    Open wound with redness, swelling, or pus  Date Last Reviewed: 9/25/2015 2000-2017 [employer]. [address] [place] [address], [place] [address]. All rights reserved. This information is not intended as a substitute for professional medical care. Always follow your healthcare professional's instructions.          Future Appointments        Provider Department Dept Phone Center     6/29/2017 9:15 AM KI Dove CNP [facility] [phone]     7/21/2017 11:00 AM Forest Ashraf MD [facility] [phone]     9/6/2017 1:30 PM Forest Ashraf MD [facility] [phone]       24 Hour Appointment Hotline       To make an appointment at any [facility], call [phone] ([phone]). If you don't have a family doctor or clinic, we will help you find one. [facility] (formerly [facility] are conveniently located to serve the needs of you and your family.             Review of your medicines      START taking        Dose / Directions Last dose taken    levETIRAcetam 500 MG tablet   Commonly known as:  KEPPRA   Dose:  500 mg   Quantity:  60 tablet        Take 1 tablet (500 mg) by mouth 2 times daily   Refills:  0          Our records show that you are taking the medicines listed below. If these are incorrect, please call your family doctor or clinic.        Dose / Directions Last dose taken    amLODIPine 2.5 MG tablet   Commonly known as:  NORVASC   Dose:  2.5 mg   Quantity:  180 tablet        Take 1 tablet (2.5 mg) by mouth 2 times daily   Refills:  3        calcium 600 MG tablet   Dose:  1 tablet   Quantity:  180 tablet        Take 1 tablet by mouth 2 times daily.   Refills:  3        calcium carbonate 500 MG chewable tablet   Commonly known as:  TUMS   Dose:  1 chew tab        Take 1 chew tab by mouth daily as needed.   Refills:  0        CYANOCOBALAMIN PO   Dose:  1000 mcg        Take 1,000 mcg by mouth daily.   Refills:  0        fluticasone 50 MCG/ACT spray   Commonly known as:  FLONASE   Dose:  2 spray   Quantity:  1 Bottle        Spray 2 sprays into both nostrils daily   Refills:  1        fosinopril 40 MG tablet   Commonly known as:  MONOPRIL   Quantity:  135 tablet        take 1/2 tablet by mouth in the morning and 1 tab in the evening   Refills:  3        magnesium hydroxide 400 MG/5ML suspension   Commonly known as:  MILK OF MAGNESIA   Dose:   30 mL        Take 30 mLs by mouth daily as needed. Constipation.   Refills:  0        metoprolol 50 MG tablet   Commonly known as:  LOPRESSOR   Quantity:  90 tablet        Take one-half tablet by mouth twice daily   Refills:  3        prune juice Liqd        Take  by mouth daily (with breakfast).   Refills:  0        psyllium 58.6 % Powd   Commonly known as:  METAMUCIL   Dose:  1 teaspoonful        Take 1 teaspoonful by mouth 3 times daily as needed. Constipation.   Refills:  0        * VITAMIN D3 PO   Dose:  1000 Units        Take 1,000 Units by mouth daily.   Refills:  0        * vitamin D 2000 UNITS tablet   Dose:  2000 Units   Quantity:  100 tablet        Take 2,000 Units by mouth daily   Refills:  3        * Notice:  This list has 2 medication(s) that are the same as other medications prescribed for you. Read the directions carefully, and ask your doctor or other care provider to review them with you.            Prescriptions were sent or printed at these locations (1 Prescription)                   Other Prescriptions                Printed at Department/Unit printer (1 of 1)         levETIRAcetam (KEPPRA) 500 MG tablet                Procedures and tests performed during your visit     CBC with platelets differential    Cardiac Continuous Monitoring    Comprehensive metabolic panel    EKG 12-lead, tracing only    MR Brain w/o & w Contrast    Peripheral IV catheter      Orders Needing Specimen Collection     None      Pending Results     No orders found from 6/24/2017 to 6/27/2017.            Pending Culture Results     No orders found from 6/24/2017 to 6/27/2017.            Pending Results Instructions     If you had any lab results that were not finalized at the time of your Discharge, you can call the ED Lab Result RN at [phone]. You will be contacted by this team for any positive Lab results or changes in treatment. The nurses are available 7 days a week from 10A to 6:30P.  You can leave a message 24  hours per day and they will return your call.        Test Results From Your Hospital Stay        6/26/2017  9:14 AM      Component Results     Component Value Ref Range & Units Status    WBC 4.7 4.0 - 11.0 10e9/L Final    RBC Count 4.26 (L) 4.4 - 5.9 10e12/L Final    Hemoglobin 12.7 (L) 13.3 - 17.7 g/dL Final    Hematocrit 37.9 (L) 40.0 - 53.0 % Final    MCV 89 78 - 100 fl Final    MCH 29.8 26.5 - 33.0 pg Final    MCHC 33.5 31.5 - 36.5 g/dL Final    RDW 13.7 10.0 - 15.0 % Final    Platelet Count 207 150 - 450 10e9/L Final    Diff Method Automated Method  Final    % Neutrophils 66.0 % Final    % Lymphocytes 20.7 % Final    % Monocytes 11.1 % Final    % Eosinophils 0.9 % Final    % Basophils 1.1 % Final    % Immature Granulocytes 0.2 % Final    Nucleated RBCs 0 0 /100 Final    Absolute Neutrophil 3.1 1.6 - 8.3 10e9/L Final    Absolute Lymphocytes 1.0 0.8 - 5.3 10e9/L Final    Absolute Monocytes 0.5 0.0 - 1.3 10e9/L Final    Absolute Eosinophils 0.0 0.0 - 0.7 10e9/L Final    Absolute Basophils 0.1 0.0 - 0.2 10e9/L Final    Abs Immature Granulocytes 0.0 0 - 0.4 10e9/L Final    Absolute Nucleated RBC 0.0  Final         6/26/2017  9:33 AM      Component Results     Component Value Ref Range & Units Status    Sodium 135 133 - 144 mmol/L Final    Potassium 3.7 3.4 - 5.3 mmol/L Final    Chloride 101 94 - 109 mmol/L Final    Carbon Dioxide 25 20 - 32 mmol/L Final    Anion Gap 9 3 - 14 mmol/L Final    Glucose 168 (H) 70 - 99 mg/dL Final    Urea Nitrogen 15 7 - 30 mg/dL Final    Creatinine 0.95 0.66 - 1.25 mg/dL Final    GFR Estimate 74 >60 mL/min/1.7m2 Final    Non  GFR Calc    GFR Estimate If Black >90   GFR Calc   >60 mL/min/1.7m2 Final    Calcium 8.6 8.5 - 10.1 mg/dL Final    Bilirubin Total 0.6 0.2 - 1.3 mg/dL Final    Albumin 3.7 3.4 - 5.0 g/dL Final    Protein Total 6.7 (L) 6.8 - 8.8 g/dL Final    Alkaline Phosphatase 63 40 - 150 U/L Final    ALT 18 0 - 70 U/L Final    AST 15 0 - 45 U/L  Final         6/26/2017 11:42 AM      Narrative     MRI BRAIN WITHOUT AND WITH CONTRAST  6/26/2017 10:29 AM    HISTORY:  Transient left arm/leg loss of control, numbness.     TECHNIQUE:  Multiplanar, multisequence MRI of the brain without and  with 7 mL Gadavist.    COMPARISON: CT scans 7/12/2013 and 11/3/2012. MRI 11/5/2012.    FINDINGS:  There is generalized atrophy of the brain. White matter T2  hyperintensities are seen in the cerebral hemispheres consistent with  sequelae of small vessel ischemic disease. Encephalomalacia and  gliosis are seen in the right parietal lobe, unchanged. These are  associated with extensive paramagnetic effect from the previous  hematoma in this area in 2012.  There is no evidence of acute  hemorrhage, mass, acute infarct, or anomaly.  Since the previous exam,  a region of poorly marginated abnormal gadolinium enhancement has  developed where the hemorrhage began, about 1.7 x 1.5 x 1.2 cm  diameter, not visible on the previous MRI.    The facial structures appear normal. The arteries at the base of the  brain and the dural venous sinuses appear patent.         Impression     IMPRESSION:   1. Abnormal enhancement in the right parietal lobe where there was a  previous hematoma in 2012. 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 Possible causes  include a vascular malformation such as cavernous hemangioma and  neoplasm. Follow-up exam in one to two months is advised.  2. 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  3. Brain atrophy and white matter changes consistent with sequelae of  small vessel ischemic disease.    BILL PIERCE MD                Clinical Quality Measure: Blood Pressure Screening     Your blood pressure was checked while you were in the emergency department today. The last reading we obtained was  BP: (!) 172/94 . Please read the guidelines below about what  these numbers mean and what you should do about them.  If your systolic blood pressure (the top number) is less than 120 and your diastolic blood pressure (the bottom number) is less than 80, then your blood pressure is normal. There is nothing more that you need to do about it.  If your systolic blood pressure (the top number) is 120-139 or your diastolic blood pressure (the bottom number) is 80-89, your blood pressure may be higher than it should be. You should have your blood pressure rechecked within a year by a primary care provider.  If your systolic blood pressure (the top number) is 140 or greater or your diastolic blood pressure (the bottom number) is 90 or greater, you may have high blood pressure. High blood pressure is treatable, but if left untreated over time it can put you at risk for heart attack, stroke, or kidney failure. You should have your blood pressure rechecked by a primary care provider within the next 4 weeks.  If your provider in the emergency department today gave you specific instructions to follow-up with your doctor or provider even sooner than that, you should follow that instruction and not wait for up to 4 weeks for your follow-up visit.        Thank you for choosing [facility]       Thank you for choosing [facility] for your care. Our goal is always to provide you with excellent care. Hearing back from our patients is one way we can continue to improve our services. Please take a few minutes to complete the written survey that you may receive in the mail after you visit with us. Thank you!        Dr. Zhart Information     [facility] gives you secure access to your electronic health record. If you see a primary care provider, you can also send messages to your care team and make appointments. If you have questions, please call your primary care clinic.  If you do not have a primary care provider, please call [phone] and they will assist you.        Care EveryWhere ID     This is your  Care EveryWhere ID. This could be used by other organizations to access your [facility] medical records  [id number]        Equal Access to Services     CRISTA VAIL : Yissel Lovell, debbie mack, meghan moe, janice dupree. So [facility] [phone].    ATENCIÓN: Si habla español, tiene a crane disposición servicios gratuitos de asistencia lingüística. Llame al [phone].    We comply with applicable federal civil rights laws and [place] laws. We do not discriminate on the basis of race, color, national origin, age, disability sex, sexual orientation or gender identity.            After Visit Summary       This is your record. Keep this with you and show to your community pharmacist(s) and doctor(s) at your next visit.

## 2017-06-26 NOTE — IP AVS SNAPSHOT
MRN:[id number]                      After Visit Summary   6/26/2017    Ernie Leary    MRN: [id number]           Thank you!     Thank you for choosing [facility] for your care. Our goal is always to provide you with excellent care. Hearing back from our patients is one way we can continue to improve our services. Please take a few minutes to complete the written survey that you may receive in the mail after you visit with us. Thank you!        Patient Information     Date Of Birth          9/23/1924        About your hospital stay     You were admitted on:  June 26, 2017 You last received care in the:  [facility] [address] [facility]    You were discharged on:  June 27, 2017        Reason for your hospital stay       You were hospitalized for further evaluation and treatment of left arm shaking, stiffness, and paresthesias thought to represent possible seizure in the setting of previous stroke.                  Who to Call     For medical emergencies, please call 911.  For non-urgent questions about your medical care, please call your primary care provider or clinic, [phone]          Attending Provider     Provider Specialty    Louis Marie MD Emergency Medicine    Jaki Barboza MD Internal Medicine       Primary Care Provider Office Phone # Fax #    Forest Ashraf [phone]      After Care Instructions     Activity       Your activity upon discharge: activity as tolerated.            Diet       Follow this diet upon discharge: Regular Diet Adult            Discharge Instructions       1) Follow-up with your primary care provider in the next week to discuss hospitalization, medication management  2) Follow-up with Neurology in the next month to discuss hospitalization  3) Keppra 500 mg by mouth twice daily prescribed at discharge; take as instructed   4) Continue all other prior to admission medications; take as instructed   5) Home RN and OT  ordered at discharge  6) Use walker at all times for fall prevention                  Follow-up Appointments     Follow-up and recommended labs and tests        Follow up with primary care provider, Forest Ashraf, within 7 days for hospital follow- up.  No follow up labs or test are needed.    Follow up with Cheyenne Neri NP on Wed July 19th at 12:15 pm. Please check in 15 min early.  [facility]  [phone]  [address], [address]  [facility] [address]                  Your next 10 appointments already scheduled     Jun 30, 2017 10:30 AM CDT   [facility]    [address] [facility] [id number] [phone]           Bring a current list of meds and any records pertaining to this visit.  For Physicals, please bring immunization records and any forms needing to be filled out.  Please arrive 10 minutes early to complete paperwork.            Jul 21, 2017 11:00 AM CDT   Office Visit with Forest Ashraf MD   [facility]    [address] [facility] [id number] [phone]           Bring a current list of meds and any records pertaining to this visit.  For Physicals, please bring immunization records and any forms needing to be filled out.  Please arrive 10 minutes early to complete paperwork.            Jul 24, 2017   Procedure with Yoel Calero MD   [facility] (--)    [address]  [facility] [address]   [phone]            Aug 21, 2017   Procedure with Yoel Calero MD   [facility] (--)    [address]  [facility] [address]   [phone]            Sep 06, 2017  1:30 PM CDT   [facility] Visit with Forest Ashraf MD   [facility]    [address] [facility] [id number] [phone]           Bring a current list of  "meds and any records pertaining to this visit.  For Physicals, please bring immunization records and any forms needing to be filled out.  Please arrive 10 minutes early to complete paperwork.              Additional Services     Home Care OT Referral for Hospital Discharge       OT to eval and treat    Your provider has ordered home care - occupational therapy. If you have not been contacted within 2 days of your discharge please call the department phone number listed on the top of this document.            Home care nursing referral       RN skilled nursing visit. RN to assess vital signs and weight, hydration, nutrition and bowel status and home safety.  RN to teach medication management.    You are being discharged with orders for home care services. These services have been arranged through [facility]. Please contact home care agency directly at [phone] with any questions/concerns.                  Further instructions from your care team         You are being discharged with orders for home care services. These services have been arranged through [facility]. Please contact home care agency directly at [phone] with any questions/concerns.    Paraesthesias  It is unclear what caused the abnormal sensations in your left arm and hand, though it is possible that you had a partial seizure.  The Neurologist recommends that you resume Keppra and follow up soon in clinic for re-evaluation.  Paraesthesia is a burning or prickling sensation that is sometimes felt in the hands, arms, legs or feet. It can also occur in other parts of the body. It can also feel like tingling or numbness, skin crawling, or itching. The feeling is not comfortable, but it is not painful. (The \"pins and needles\" feeling that happens when a foot or hand \"falls asleep\" is a temporary paraesthesia.)  Paraesthesias that last or come and go may be caused by medical issues that need to be treated. These include stroke, a " bulging disk pressing on a nerve, a trapped nerve, vitamin deficiencies, or even certain medicines.  Tests are often done. These tests may include blood tests, X-ray, CT (computerized tomography) scan, or a muscle test (electromyography). Depending on the cause, treatment may include physical therapy.  Home care    Tell the healthcare provider about all medicines you take. This includes prescription and over-the-counter medicines, vitamins, and herbs. Ask if any of the medicines may be causing your problems. Do not make any changes to prescription medicines without talking to your healthcare provider first.    You may be prescribed medicines to help relieve the tingling feeling or for pain. Take all medicines as directed.    A numb hand or foot may be more prone to injury. To help protect it:    Always use oven mitts.    Test water with an unaffected hand or foot.    Use caution when trimming nails. File sharp areas.    Wear shoes that fit well to avoid pressure points, blisters, and ulcers.    Inspect your hands and feet carefully (including the soles of your feet and between your toes) at least once a week. If you see red areas, sores, or other problems, tell your healthcare provider.  Follow-up care  Follow up with your doctor or as advised by our staff. You may need further testing or evaluation.  When to seek medical advice  Call your healthcare provider right away if any of the following occur:    Numbness or weakness of the face, one arm, or one leg    Slurred speech, confusion, trouble speaking, walking, or seeing    Severe headache, fainting spell, dizziness, or seizure    Chest, arm, neck, or upper back pain    Loss of bladder or bowel control    Open wound with redness, swelling, or pus  Date Last Reviewed: 9/25/2015 2000-2017 [employer]. [address] [place] [address]. All rights reserved. This information is not intended as a substitute for professional medical care. Always  follow your healthcare professional's instructions.          Pending Results     No orders found for last 3 day(s).            Statement of Approval     Ordered          06/27/17 1113  I have reviewed and agree with all the recommendations and orders detailed in this document.  EFFECTIVE NOW     Approved and electronically signed by:  Delores Owusu PA-C             Admission Information     Date & Time Provider Department Dept. Phone    6/26/2017 Jaki Barboza MD [address] [facility] [phone]      Your Vitals Were     Blood Pressure Pulse Temperature Respirations Weight Pulse Oximetry    104/62 77 97.6  F (36.4  C) (Oral) 16 72.9 kg (160 lb 12.8 oz) 95%    BMI (Body Mass Index)                   25.18 kg/m2           MyChart Information     [facility] gives you secure access to your electronic health record. If you see a primary care provider, you can also send messages to your care team and make appointments. If you have questions, please call your primary care clinic.  If you do not have a primary care provider, please call [phone] and they will assist you.        Care EveryWhere ID     This is your Care EveryWhere ID. This could be used by other organizations to access your [facility] medical records  [id number]        Equal Access to Services     CRISTA VAIL : Yissel Lovell, waorestesda frederick, qaybta kaalmada payal, janice dupree. So [facility] [phone].    ATENCIÓN: Si habla español, tiene a crane disposición servicios gratuitos de asistencia lingüística. Llame al [phone].    We comply with applicable federal civil rights laws and [place] laws. We do not discriminate on the basis of race, color, national origin, age, disability sex, sexual orientation or gender identity.               Review of your medicines      START taking        Dose / Directions    levETIRAcetam 500 MG tablet   Commonly known as:  KEPPRA         Dose:  500 mg   Take 1 tablet (500 mg) by mouth 2 times daily   Quantity:  60 tablet   Refills:  0         CONTINUE these medicines which have NOT CHANGED        Dose / Directions    amLODIPine 2.5 MG tablet   Commonly known as:  NORVASC   Used for:  Essential hypertension, benign        Dose:  2.5 mg   Take 1 tablet (2.5 mg) by mouth 2 times daily   Quantity:  180 tablet   Refills:  3       calcium 600 MG tablet        Dose:  1 tablet   Take 1 tablet by mouth daily   Quantity:  180 tablet   Refills:  3       calcium carbonate 500 MG chewable tablet   Commonly known as:  TUMS        Dose:  1 chew tab   Take 1 chew tab by mouth daily as needed.   Refills:  0       COENZYME Q-10 PO        Dose:  100 mg   Take 100 mg by mouth daily   Refills:  0       CYANOCOBALAMIN PO        Dose:  1000 mcg   Take 1,000 mcg by mouth daily.   Refills:  0       fluticasone 50 MCG/ACT spray   Commonly known as:  FLONASE   Used for:  Chronic rhinitis        Dose:  2 spray   Spray 2 sprays into both nostrils daily   Quantity:  1 Bottle   Refills:  1       fosinopril 40 MG tablet   Commonly known as:  MONOPRIL   Used for:  Essential hypertension, benign        take 1/2 tablet by mouth in the morning and 1 tab in the evening   Quantity:  135 tablet   Refills:  3       magnesium hydroxide 400 MG/5ML suspension   Commonly known as:  MILK OF MAGNESIA        Dose:  30 mL   Take 30 mLs by mouth daily as needed. Constipation.   Refills:  0       metoprolol 50 MG tablet   Commonly known as:  LOPRESSOR   Used for:  Essential hypertension, benign        Take one-half tablet by mouth twice daily   Quantity:  90 tablet   Refills:  3       omega-3 acid ethyl esters 1 G capsule   Commonly known as:  Lovaza        Dose:  1 g   Take 1 g by mouth daily   Refills:  0       prune juice Liqd        Take  by mouth daily (with breakfast).   Refills:  0       psyllium 58.6 % Powd   Commonly known as:  METAMUCIL        Dose:  1 teaspoonful   Take 1 teaspoonful  by mouth 3 times daily as needed. Constipation.   Refills:  0       vitamin B complex with vitamin C Tabs tablet        Dose:  1 tablet   Take 1 tablet by mouth daily   Refills:  0       vitamin D 2000 UNITS tablet   Used for:  Recurrent falls        Dose:  2000 Units   Take 2,000 Units by mouth daily   Quantity:  100 tablet   Refills:  3            Where to get your medicines      These medications were sent to [id number] - [place], [place] - [address]  [address] [place] [address]     Phone:  [phone]     levETIRAcetam 500 MG tablet                Protect others around you: Learn how to safely use, store and throw away your medicines at www.disposemymeds.org.             Medication List: This is a list of all your medications and when to take them. Check marks below indicate your daily home schedule. Keep this list as a reference.      Medications           Morning Afternoon Evening Bedtime As Needed    amLODIPine 2.5 MG tablet   Commonly known as:  NORVASC   Take 1 tablet (2.5 mg) by mouth 2 times daily   Last time this was given:  2.5 mg on 6/27/2017  8:32 AM   Next Dose Due:  This evening at 8 PM                                   calcium 600 MG tablet   Take 1 tablet by mouth daily   Next Dose Due:  Resume home schedule                                   calcium carbonate 500 MG chewable tablet   Commonly known as:  TUMS   Take 1 chew tab by mouth daily as needed.                                   COENZYME Q-10 PO   Take 100 mg by mouth daily   Next Dose Due:  Resume home schedule                                   CYANOCOBALAMIN PO   Take 1,000 mcg by mouth daily.   Next Dose Due:  Resume home schedule                                   fluticasone 50 MCG/ACT spray   Commonly known as:  FLONASE   Spray 2 sprays into both nostrils daily   Last time this was given:  2 sprays on 6/26/2017  8:42 PM   Next Dose Due:  This evening at 8 PM                                   fosinopril 40 MG  tablet   Commonly known as:  MONOPRIL   take 1/2 tablet by mouth in the morning and 1 tab in the evening   Last time this was given:  20 mg on 6/27/2017  8:32 AM   Next Dose Due:  This evening at 8 PM                                      levETIRAcetam 500 MG tablet   Commonly known as:  KEPPRA   Take 1 tablet (500 mg) by mouth 2 times daily   Last time this was given:  500 mg on 6/27/2017  8:32 AM   Next Dose Due:  This evening at 8 PM                                      magnesium hydroxide 400 MG/5ML suspension   Commonly known as:  MILK OF MAGNESIA   Take 30 mLs by mouth daily as needed. Constipation.                                   metoprolol 50 MG tablet   Commonly known as:  LOPRESSOR   Take one-half tablet by mouth twice daily   Last time this was given:  50 mg on 6/26/2017  8:42 PM   Next Dose Due:  This evening at 8 PM                                      omega-3 acid ethyl esters 1 G capsule   Commonly known as:  Lovaza   Take 1 g by mouth daily   Next Dose Due:  Resume home schedule                                   prune juice Liqd   Take  by mouth daily (with breakfast).   Next Dose Due:  Resume home schedule                                   psyllium 58.6 % Powd   Commonly known as:  METAMUCIL   Take 1 teaspoonful by mouth 3 times daily as needed. Constipation.                                   vitamin B complex with vitamin C Tabs tablet   Take 1 tablet by mouth daily   Next Dose Due:  Resume home schedule                                   vitamin D 2000 UNITS tablet   Take 2,000 Units by mouth daily   Next Dose Due:  Resume home schedule                                             More Information        Patient Education    Levetiracetam Oral solution    Levetiracetam Oral tablet    Levetiracetam Oral tablet, extended-release    Levetiracetam Solution for injection  Levetiracetam Oral tablet  What is this medicine?  LEVETIRACETAM (chung robledo) is an antiepileptic drug. It is used with  other medicines to treat certain types of seizures.  This medicine may be used for other purposes; ask your health care provider or pharmacist if you have questions.  What should I tell my health care provider before I take this medicine?  They need to know if you have any of these conditions:    kidney disease    suicidal thoughts, plans, or attempt; a previous suicide attempt by you or a family member    an unusual or allergic reaction to levetiracetam, other medicines, foods, dyes, or preservatives    pregnant or trying to get pregnant    breast-feeding  How should I use this medicine?  Take this medicine by mouth with a glass of water. Follow the directions on the prescription label. Swallow the tablets whole. Do not crush or chew this medicine. You may take this medicine with or without food. Take your doses at regular intervals. Do not take your medicine more often than directed. Do not stop taking this medicine or any of your seizure medicines unless instructed by your doctor or health care professional. Stopping your medicine suddenly can increase your seizures or their severity.  A special MedGuide will be given to you by the pharmacist with each prescription and refill. Be sure to read this information carefully each time.  Contact your pediatrician or health care professional regarding the use of this medication in children. While this drug may be prescribed for children as young as 4 years of age for selected conditions, precautions do apply.  Overdosage: If you think you have taken too much of this medicine contact a poison control center or emergency room at once.  NOTE: This medicine is only for you. Do not share this medicine with others.  What if I miss a dose?  If you miss a dose, take it as soon as you can. If it is almost time for your next dose, take only that dose. Do not take double or extra doses.  What may interact with this medicine?  This medicine may interact with the following  medications:    carbamazepine    colesevelam    probenecid    sevelamer  This list may not describe all possible interactions. Give your health care provider a list of all the medicines, herbs, non-prescription drugs, or dietary supplements you use. Also tell them if you smoke, drink alcohol, or use illegal drugs. Some items may interact with your medicine.  What should I watch for while using this medicine?  Visit your doctor or health care professional for a regular check on your progress. Wear a medical identification bracelet or chain to say you have epilepsy, and carry a card that lists all your medications.  It is important to take this medicine exactly as instructed by your health care professional. When first starting treatment, your dose may need to be adjusted. It may take weeks or months before your dose is stable. You should contact your doctor or health care professional if your seizures get worse or if you have any new types of seizures.  You may get drowsy or dizzy. Do not drive, use machinery, or do anything that needs mental alertness until you know how this medicine affects you. Do not stand or sit up quickly, especially if you are an older patient. This reduces the risk of dizzy or fainting spells. Alcohol may interfere with the effect of this medicine. Avoid alcoholic drinks.  The use of this medicine may increase the chance of suicidal thoughts or actions. Pay special attention to how you are responding while on this medicine. Any worsening of mood, or thoughts of suicide or dying should be reported to your health care professional right away.  Women who become pregnant while using this medicine may enroll in the [place] Antiepileptic Drug Pregnancy Registry by calling 1-756.881.1870. This registry collects information about the safety of antiepileptic drug use during pregnancy.  What side effects may I notice from receiving this medicine?  Side effects you should report to your doctor or  health care professional as soon as possible:    allergic reactions like skin rash, itching or hives, swelling of the face, lips, or tongue    breathing problems    dark urine    general ill feeling or flu-like symptoms    problems with balance, talking, walking    unusually weak or tired    worsening of mood, thoughts or actions of suicide or dying    yellowing of the eyes or skin  Side effects that usually do not require medical attention (report to your doctor or health care professional if they continue or are bothersome):    diarrhea    dizzy, drowsy    headache    loss of appetite  This list may not describe all possible side effects. Call your doctor for medical advice about side effects. You may report side effects to FDA at 1-545-FDA-7218.  Where should I keep my medicine?  Keep out of reach of children.  Store at room temperature between 15 and 30 degrees C (59 and 86 degrees F). Throw away any unused medicine after the expiration date.  NOTE:This sheet is a summary. It may not cover all possible information. If you have questions about this medicine, talk to your doctor, pharmacist, or health care provider. Copyright  2016 [employer]

## 2017-06-26 NOTE — PLAN OF CARE
Problem: Goal Outcome Summary  Goal: Goal Outcome Summary  Outcome: Improving  Patient here due to stroke-like symptoms. A&O x 4. Neuros include BLE and BUE weakness and tremors. VSS. Tele NSR. Regular diet. Up with A1 + GB. Denies pain. Continue to monitor and follow POC.

## 2017-06-27 ENCOUNTER — APPOINTMENT (OUTPATIENT)
Dept: OCCUPATIONAL THERAPY | Facility: CLINIC | Age: 82
End: 2017-06-27
Attending: PHYSICIAN ASSISTANT
Payer: MEDICARE

## 2017-06-27 ENCOUNTER — APPOINTMENT (OUTPATIENT)
Dept: PHYSICAL THERAPY | Facility: CLINIC | Age: 82
End: 2017-06-27
Attending: PHYSICIAN ASSISTANT
Payer: MEDICARE

## 2017-06-27 ENCOUNTER — TELEPHONE (OUTPATIENT)
Dept: FAMILY MEDICINE | Facility: CLINIC | Age: 82
End: 2017-06-27

## 2017-06-27 VITALS
SYSTOLIC BLOOD PRESSURE: 104 MMHG | HEART RATE: 77 BPM | DIASTOLIC BLOOD PRESSURE: 62 MMHG | RESPIRATION RATE: 16 BRPM | BODY MASS INDEX: 25.18 KG/M2 | OXYGEN SATURATION: 95 % | WEIGHT: 160.8 LBS | TEMPERATURE: 97.6 F

## 2017-06-27 LAB
ANION GAP SERPL CALCULATED.3IONS-SCNC: 6 MMOL/L (ref 3–14)
BASOPHILS # BLD AUTO: 0 10E9/L (ref 0–0.2)
BASOPHILS NFR BLD AUTO: 0.7 %
BUN SERPL-MCNC: 16 MG/DL (ref 7–30)
CALCIUM SERPL-MCNC: 8.6 MG/DL (ref 8.5–10.1)
CHLORIDE SERPL-SCNC: 104 MMOL/L (ref 94–109)
CO2 SERPL-SCNC: 28 MMOL/L (ref 20–32)
CREAT SERPL-MCNC: 0.98 MG/DL (ref 0.66–1.25)
DEPRECATED CALCIDIOL+CALCIFEROL SERPL-MC: 25 UG/L (ref 20–75)
DIFFERENTIAL METHOD BLD: ABNORMAL
EOSINOPHIL # BLD AUTO: 0.1 10E9/L (ref 0–0.7)
EOSINOPHIL NFR BLD AUTO: 1.6 %
ERYTHROCYTE [DISTWIDTH] IN BLOOD BY AUTOMATED COUNT: 13.7 % (ref 10–15)
GFR SERPL CREATININE-BSD FRML MDRD: 71 ML/MIN/1.7M2
GLUCOSE BLDC GLUCOMTR-MCNC: 102 MG/DL (ref 70–99)
GLUCOSE SERPL-MCNC: 104 MG/DL (ref 70–99)
HCT VFR BLD AUTO: 36.3 % (ref 40–53)
HGB BLD-MCNC: 12.2 G/DL (ref 13.3–17.7)
IMM GRANULOCYTES # BLD: 0 10E9/L (ref 0–0.4)
IMM GRANULOCYTES NFR BLD: 0.2 %
LYMPHOCYTES # BLD AUTO: 1.4 10E9/L (ref 0.8–5.3)
LYMPHOCYTES NFR BLD AUTO: 24.9 %
MCH RBC QN AUTO: 29.9 PG (ref 26.5–33)
MCHC RBC AUTO-ENTMCNC: 33.6 G/DL (ref 31.5–36.5)
MCV RBC AUTO: 89 FL (ref 78–100)
MONOCYTES # BLD AUTO: 0.8 10E9/L (ref 0–1.3)
MONOCYTES NFR BLD AUTO: 15 %
NEUTROPHILS # BLD AUTO: 3.2 10E9/L (ref 1.6–8.3)
NEUTROPHILS NFR BLD AUTO: 57.6 %
NRBC # BLD AUTO: 0 10*3/UL
NRBC BLD AUTO-RTO: 0 /100
PLATELET # BLD AUTO: 192 10E9/L (ref 150–450)
POTASSIUM SERPL-SCNC: 4.4 MMOL/L (ref 3.4–5.3)
RBC # BLD AUTO: 4.08 10E12/L (ref 4.4–5.9)
SODIUM SERPL-SCNC: 138 MMOL/L (ref 133–144)
WBC # BLD AUTO: 5.6 10E9/L (ref 4–11)

## 2017-06-27 PROCEDURE — 97166 OT EVAL MOD COMPLEX 45 MIN: CPT | Mod: GO

## 2017-06-27 PROCEDURE — 85025 COMPLETE CBC W/AUTO DIFF WBC: CPT | Performed by: PHYSICIAN ASSISTANT

## 2017-06-27 PROCEDURE — 80048 BASIC METABOLIC PNL TOTAL CA: CPT | Performed by: PHYSICIAN ASSISTANT

## 2017-06-27 PROCEDURE — 00000146 ZZHCL STATISTIC GLUCOSE BY METER IP

## 2017-06-27 PROCEDURE — 99217 ZZC OBSERVATION CARE DISCHARGE: CPT | Performed by: PHYSICIAN ASSISTANT

## 2017-06-27 PROCEDURE — 97530 THERAPEUTIC ACTIVITIES: CPT | Mod: GP | Performed by: PHYSICAL THERAPIST

## 2017-06-27 PROCEDURE — 40000193 ZZH STATISTIC PT WARD VISIT: Performed by: PHYSICAL THERAPIST

## 2017-06-27 PROCEDURE — 25000132 ZZH RX MED GY IP 250 OP 250 PS 637: Mod: GY | Performed by: PSYCHIATRY & NEUROLOGY

## 2017-06-27 PROCEDURE — A9270 NON-COVERED ITEM OR SERVICE: HCPCS | Mod: GY | Performed by: PSYCHIATRY & NEUROLOGY

## 2017-06-27 PROCEDURE — 25000132 ZZH RX MED GY IP 250 OP 250 PS 637: Mod: GY | Performed by: PHYSICIAN ASSISTANT

## 2017-06-27 PROCEDURE — 97161 PT EVAL LOW COMPLEX 20 MIN: CPT | Mod: GP | Performed by: PHYSICAL THERAPIST

## 2017-06-27 PROCEDURE — 40000133 ZZH STATISTIC OT WARD VISIT

## 2017-06-27 PROCEDURE — 99207 ZZC CDG-CODE CATEGORY CHANGED: CPT | Performed by: PHYSICIAN ASSISTANT

## 2017-06-27 PROCEDURE — 97116 GAIT TRAINING THERAPY: CPT | Mod: GP | Performed by: PHYSICAL THERAPIST

## 2017-06-27 PROCEDURE — 97535 SELF CARE MNGMENT TRAINING: CPT | Mod: GO,59

## 2017-06-27 PROCEDURE — 36415 COLL VENOUS BLD VENIPUNCTURE: CPT | Performed by: PHYSICIAN ASSISTANT

## 2017-06-27 PROCEDURE — G0378 HOSPITAL OBSERVATION PER HR: HCPCS

## 2017-06-27 PROCEDURE — A9270 NON-COVERED ITEM OR SERVICE: HCPCS | Mod: GY | Performed by: PHYSICIAN ASSISTANT

## 2017-06-27 RX ORDER — LEVETIRACETAM 500 MG/1
500 TABLET ORAL 2 TIMES DAILY
Qty: 60 TABLET | Refills: 0 | Status: SHIPPED | OUTPATIENT
Start: 2017-06-27 | End: 2021-03-18

## 2017-06-27 RX ORDER — LEVETIRACETAM 500 MG/1
500 TABLET ORAL 2 TIMES DAILY
Qty: 60 TABLET | Refills: 0 | Status: SHIPPED | OUTPATIENT
Start: 2017-06-27 | End: 2017-06-27

## 2017-06-27 RX ADMIN — FOSINOPRIL SODIUM 20 MG: 10 TABLET ORAL at 08:32

## 2017-06-27 RX ADMIN — LEVETIRACETAM 500 MG: 500 TABLET, FILM COATED ORAL at 08:32

## 2017-06-27 RX ADMIN — AMLODIPINE BESYLATE 2.5 MG: 2.5 TABLET ORAL at 08:32

## 2017-06-27 ASSESSMENT — VISUAL ACUITY
OU: NORMAL ACUITY
OU: NORMAL ACUITY
OU: NORMAL ACUITY;GLASSES

## 2017-06-27 ASSESSMENT — ACTIVITIES OF DAILY LIVING (ADL): PREVIOUS_RESPONSIBILITIES: MEDICATION MANAGEMENT

## 2017-06-27 NOTE — DISCHARGE INSTRUCTIONS
"  You are being discharged with orders for home care services. These services have been arranged through [facility]. Please contact home care agency directly at [phone] with any questions/concerns.    Paraesthesias  It is unclear what caused the abnormal sensations in your left arm and hand, though it is possible that you had a partial seizure.  The Neurologist recommends that you resume Keppra and follow up soon in clinic for re-evaluation.  Paraesthesia is a burning or prickling sensation that is sometimes felt in the hands, arms, legs or feet. It can also occur in other parts of the body. It can also feel like tingling or numbness, skin crawling, or itching. The feeling is not comfortable, but it is not painful. (The \"pins and needles\" feeling that happens when a foot or hand \"falls asleep\" is a temporary paraesthesia.)  Paraesthesias that last or come and go may be caused by medical issues that need to be treated. These include stroke, a bulging disk pressing on a nerve, a trapped nerve, vitamin deficiencies, or even certain medicines.  Tests are often done. These tests may include blood tests, X-ray, CT (computerized tomography) scan, or a muscle test (electromyography). Depending on the cause, treatment may include physical therapy.  Home care    Tell the healthcare provider about all medicines you take. This includes prescription and over-the-counter medicines, vitamins, and herbs. Ask if any of the medicines may be causing your problems. Do not make any changes to prescription medicines without talking to your healthcare provider first.    You may be prescribed medicines to help relieve the tingling feeling or for pain. Take all medicines as directed.    A numb hand or foot may be more prone to injury. To help protect it:    Always use oven mitts.    Test water with an unaffected hand or foot.    Use caution when trimming nails. File sharp areas.    Wear shoes that fit well to avoid pressure " points, blisters, and ulcers.    Inspect your hands and feet carefully (including the soles of your feet and between your toes) at least once a week. If you see red areas, sores, or other problems, tell your healthcare provider.  Follow-up care  Follow up with your doctor or as advised by our staff. You may need further testing or evaluation.  When to seek medical advice  Call your healthcare provider right away if any of the following occur:    Numbness or weakness of the face, one arm, or one leg    Slurred speech, confusion, trouble speaking, walking, or seeing    Severe headache, fainting spell, dizziness, or seizure    Chest, arm, neck, or upper back pain    Loss of bladder or bowel control    Open wound with redness, swelling, or pus  Date Last Reviewed: 9/25/2015 2000-2017 [employer]. [address] [place] [id number]. All rights reserved. This information is not intended as a substitute for professional medical care. Always follow your healthcare professional's instructions.

## 2017-06-27 NOTE — PLAN OF CARE
Problem: Goal Outcome Summary  Goal: Goal Outcome Summary  Physical Therapy Discharge Summary     Reason for therapy discharge:    Discharged to home.     Progress towards therapy goal(s). See goals on Care Plan in [facility] electronic health record for goal details.  Goals met     Therapy recommendation(s):    No further therapy is recommended.  assist from spouse for safety with mobility and use of a walker

## 2017-06-27 NOTE — PROGRESS NOTES
Sw Progress Note  Chart Reviewed, Pt discussed in Interdisciplinary Rounds.   Pt is here under observation care. Pt is planning to discharge home with home care through [facility]. Referrals have been made and [facility] liaison here for consult.    Intervention: none. Consult acknowledged     Team Members notified: [email or web address]    Plan: Discharge home with home care through [facility].

## 2017-06-27 NOTE — PROGRESS NOTES
Care Transition Initial Assessment - RN    Reason For Consult: discharge planning   Met with: Patient and Family.    DATA   Active Problems:    TIA (transient ischemic attack)       Cognitive Status: awake, alert and oriented.  [facility]: [facility]  Primary Care MD Name: Dr Ashraf  Contact information and PCP information verified: Yes    Lives With: spouse  Living Arrangements: condominium  Quality Of Family Relationships: supportive, helpful, involved  Description of Support System: Supportive, Involved   Who is your support system?: Wife, Children   Support Assessment: Adequate family and caregiver support, Adequate social supports     Insurance concerns: No Insurance issues identified    ASSESSMENT  Patient currently receives the following services:  None          Identified issues/concerns regarding health management: Needs home RN for med management check and home OT.  Pt/wife do not want to have home PT for now but they will have him go to a balance class that is offered at their facility and if the instructor recommends PT, they will request an order from PCP.    PLAN  Financial costs for the patient include none .  Patient given options and choices for discharge: offered home care agency choice and they would like [facility].  Pt had it in the past after his stroke .  Patient/family is agreeable to the plan?  Yes.  Patient anticipates discharging to home w/ home RN/OT and wife supervision.     Patient anticipates needs for home equipment: No, pt/wife refuse walker     Appointments: PCP and neuro appts made

## 2017-06-27 NOTE — PROGRESS NOTES
06/27/17 0905   Quick Adds   Type of Visit Initial Occupational Therapy Evaluation   Living Environment   Lives With spouse   Living Arrangements condominium   Home Accessibility no concerns   Transportation Available car;family or friend will provide   Living Environment Comment Pt and wife just moved into condo with elevator access. Pt prefers to use stairs to enter building.    Self-Care   Dominant Hand right   Usual Activity Tolerance good   Current Activity Tolerance moderate   Equipment Currently Used at Home grab bar;shower chair;raised toilet   Functional Level Prior   Ambulation 0-->independent   Transferring 0-->independent   Toileting 1-->assistive equipment   Bathing 1-->assistive equipment   Dressing 0-->independent   Eating 0-->independent   Communication 0-->understands/communicates without difficulty   Swallowing 0-->swallows foods/liquids without difficulty   Cognition 0 - no cognition issues reported   Fall history within last six months yes   Number of times patient has fallen within last six months 1   General Information   Onset of Illness/Injury or Date of Surgery - Date 06/26/17   Referring Physician DASHAWN Owusu PA-C   Patient/Family Goals Statement Pt plans to discharge home today.    Additional Occupational Profile Info/Pertinent History of Current Problem Admitted under observation for L UE sensational changes due to possible seizure.  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    Precautions/Limitations fall precautions   General Observations Wife present during eval.    Cognitive Status Examination   Orientation orientation to person, place and time   Level of Consciousness alert;confused   Able to Follow Commands WNL/WFL   Personal Safety (Cognitive) at risk behaviors demonstrated;decreased insight to deficits;impulsive   Memory impaired   Visual Perception   Visual Perception Wears glasses   Visual Perception Comments Denies blurred or doubled vision.     Sensory Examination   Sensory Comments Denies B UE numbness and tingling.    Pain Assessment   Patient Currently in Pain No   Range of Motion (ROM)   ROM Quick Adds No deficits were identified   ROM Comment B UE WNL   Strength   Manual Muscle Testing Quick Adds Other   Strength Comments L UE 4/5 and R UE 5/5 on MMT. Pt's wife states pt has had L sided weakness since CVA in 2012.    Hand Strength   Hand Strength Comments B grasp WFL and equal   Mobility   Bed Mobility Bed mobility skill: Rolling/Turning;Bed mobility skill: Scooting/Bridging;Bed mobility skill: Sit to supine;Bed mobility skill: Supine to sit;Bed mobility analysis   Bed Mobility Skill: Rolling/Turning   Level of [place] - Bed Mobility Skill Rolling Turning stand-by assist   Bed Mobility Skill: Scooting/Bridging   Level of [place]: Scooting/Bridging stand-by assist   Bed Mobility Skill: Sit to Supine   Level of [place]: Sit/Supine stand-by assist   Bed Mobility Skill: Supine to Sit   Level of [place]: Supine/Sit stand-by assist   Bed Mobility Analysis   Bed Mobility Limitations cognitive deficits   Impairments Contributing to Impaired Bed Mobility impaired balance   Transfer Skills   Transfer Transfer Safety Analysis Bed/Chair;Transfer Safety Analysis Sit/Stand;Transfer Skill: Stand to Sit   Transfer Skill: Bed to Chair/Chair to Bed   Level of [place]: Bed to Chair minimum assist (75% patients effort)   Transfer Safety Analysis Bed/Chair   Transfer Safety Concerns Noted decreased balance during turns;losing balance backward   Impairments Contributing to Impaired Transfers impaired balance   Transfer Skill: Sit to Stand   Level of [place]: Sit/Stand minimum assist (75% patients effort)   Transfer Safety Analysis Sit/Stand   Transfer Safety Concerns Noted: Sit/Stand decreased balance during turns;losing balance backward   Impaired Transfers: Sit/Stand impaired balance   Toilet Transfer   Toilet Transfer Toilet Transfer  "Skill;Toilet Transfer Safety Analysis   Transfer Skill: Toilet Transfer   Level of [place]: Toilet minimum assist (75% patients effort)   Assistive Device seat riser;grab bars   Transfer Safety Analysis Toilet   Transfer Safety Concerns Noted: Toilet decreased balance during turns;losing balance backward   Transfer Safety Analysis Toilet impaired balance   Lower Body Dressing   Level of [place]: Dress Lower Body minimum assist (75% patients effort)   Toileting   Level of [place]: Toilet minimum assist (75% patients effort)   Eating/Self Feeding   Level of [place]: Eating independent   Instrumental Activities of Daily Living (IADL)   Previous Responsibilities medication management   Activities of Daily Living Analysis   Impairments Contributing to Impaired Activities of Daily Living balance impaired;cognition impaired   General Therapy Interventions   Planned Therapy Interventions ADL retraining;cognition;transfer training   Clinical Impression   Criteria for Skilled Therapeutic Interventions Met yes, treatment indicated   OT Diagnosis Decreased I and safety with ADLs   Influenced by the following impairments Impaired cognition and safety; Decreased balance   Assessment of Occupational Performance 3-5 Performance Deficits   Identified Performance Deficits Dressing; Toileting; Showering; Med mgmt   Clinical Decision Making (Complexity) Moderate complexity   Therapy Frequency 3 times/wk   Predicted Duration of Therapy Intervention (days/wks) 7 days   Anticipated Discharge Disposition Home with Assist;Home with Home Therapy   Risks and Benefits of Treatment have been explained. Yes   Patient, Family & other staff in agreement with plan of care Yes   [facility] [employer] TM \"6 Clicks\"   2016, Trustees of [facility], under license to [employer].  All rights reserved.   6 Clicks Short Forms Daily Activity Inpatient Short Form   [facility] AM-PAC  \"6 Clicks\" Daily Activity Inpatient " Short Form   1. Putting on and taking off regular lower body clothing? 3 - A Little   2. Bathing (including washing, rinsing, drying)? 3 - A Little   3. Toileting, which includes using toilet, bedpan or urinal? 3 - A Little   4. Putting on and taking off regular upper body clothing? 4 - None   5. Taking care of personal grooming such as brushing teeth? 4 - None   6. Eating meals? 4 - None   Daily Activity Raw Score (Score out of 24.Lower scores equate to lower levels of function) 21   Total Evaluation Time   Total Evaluation Time (Minutes) 12

## 2017-06-27 NOTE — TELEPHONE ENCOUNTER
Patient is seeing Yamilex randolph Friday 07/30/17 at 10:30  For a post hospital follow up, no openings with Dr. Ashraf

## 2017-06-27 NOTE — PROGRESS NOTES
06/27/17 1015   Quick Adds   Type of Visit Initial PT Evaluation   Living Environment   Lives With spouse   Living Arrangements condominium   Home Accessibility no concerns   Number of Stairs to Enter Home 0   Number of Stairs Within Home 0   Transportation Available car;family or friend will provide  (patient no longer drives)   Living Environment Comment Pt and wife just moved into condo with elevator access. Pt prefers to use stairs to enter building   Self-Care   Usual Activity Tolerance good   Current Activity Tolerance good   Regular Exercise yes   Activity/Exercise Type other (see comments);walking  (does stairs in building for ex)   Exercise Amount/Frequency daily   Equipment Currently Used at Home grab bar;shower chair;raised toilet   Activity/Exercise/Self-Care Comment patient independently getting around without an assistive device prior to admit; residual L sided weakness from prior stroke   Functional Level Prior   Ambulation 0-->independent   Transferring 0-->independent   Toileting 1-->assistive equipment   Bathing 1-->assistive equipment   Dressing 0-->independent   Eating 0-->independent   Communication 0-->understands/communicates without difficulty   Swallowing 0-->swallows foods/liquids without difficulty   Cognition 1 - attention or memory deficits  (oriented but some impairment at baseline noted )   Fall history within last six months yes   Number of times patient has fallen within last six months 3  (slipped on ice)   Which of the above functional risks had a recent onset or change? ambulation   Prior Functional Level Comment patient was independent with all functional mobility without an assistive device   General Information   Onset of Illness/Injury or Date of Surgery - Date 06/26/17   Referring Physician Delores Owusu PA-C   Patient/Family Goals Statement Return home today   Pertinent History of Current Problem (include personal factors and/or comorbidities that impact the  POC) Admitted under observation for L UE sensational changes due to possible seizure.  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.; see medical record for further information   Precautions/Limitations fall precautions   General Observations impulsive with movement   Cognitive Status Examination   Orientation orientation to person, place and time   Level of Consciousness alert   Follows Commands and Answers Questions 75% of the time;100% of the time   Personal Safety and Judgment impulsive;impaired   Memory impaired   Cognitive Comment baseline impairment per chart; decreased insight into deficits   Pain Assessment   Patient Currently in Pain No   Posture    Posture Comments forward flexed at head and trunk   Range of Motion (ROM)   ROM Comment WFL   Strength   Strength Comments residual L sided weakness from prior stroke but functional   Bed Mobility   Bed Mobility Comments independent   Transfer Skills   Transfer Comments SBA; mild unsteadiness without UE support   Gait   Gait Comments gait x 50 feet without an assistive device; mild unsteadiness with some R path deviation but no gross LOB; improved with use of a walker currently   Balance   Balance Comments baseline impairment from prior stroke but wasn't using a device; currently recommend walker   General Therapy Interventions   Planned Therapy Interventions gait training;transfer training   Clinical Impression   Criteria for Skilled Therapeutic Intervention yes, treatment indicated   PT Diagnosis impaired gait stability   Influenced by the following impairments functional weakness; baseline impaired balance   Functional limitations due to impairments impaired independence with functional mobility; impaired balance   Clinical Presentation Stable/Uncomplicated   Clinical Presentation Rationale prior stroke; appears at or near baseline; will have assist of spouse   Clinical Decision Making (Complexity) Low complexity   Therapy  "Frequency` other (see comments)  (1x eval and treatment)   Predicted Duration of Therapy Intervention (days/wks) 1x eval and treatment   Anticipated Equipment Needs at Discharge front wheeled walker  (4WW)   Anticipated Discharge Disposition Home with Assist   Risk & Benefits of therapy have been explained Yes   Patient, Family & other staff in agreement with plan of care Yes   [facility] TM \"6 Clicks\"   2016, Trustees of [facility], under license to [employer].  All rights reserved.   6 Clicks Short Forms Basic Mobility Inpatient Short Form   [facility]  \"6 Clicks\" V.2 Basic Mobility Inpatient Short Form   1. Turning from your back to your side while in a flat bed without using bedrails? 4 - None   2. Moving from lying on your back to sitting on the side of a flat bed without using bedrails? 4 - None   3. Moving to and from a bed to a chair (including a wheelchair)? 3 - A Little   4. Standing up from a chair using your arms (e.g., wheelchair, or bedside chair)? 3 - A Little   5. To walk in hospital room? 3 - A Little   6. Climbing 3-5 steps with a railing? 3 - A Little   Basic Mobility Raw Score (Score out of 24.Lower scores equate to lower levels of function) 20   Total Evaluation Time   Total Evaluation Time (Minutes) 10     "

## 2017-06-27 NOTE — DISCHARGE SUMMARY
[facility]    Discharge Summary  Hospitalist    Date of Admission:  6/26/2017  Date of Discharge:  6/27/2017  Discharging Provider: Delores Owusu PA-C  Date of Service (when I saw the patient): 06/27/17    Discharge Diagnoses   Seizure disorder     History of Present Illness    Ernie Leary is a 89-year-old gentleman with past medical history of right parietal intracerebral hemorrhage, amyloid angiopathy, seizure disorder (not currently on medications), atrial fibrillation status post ablation (not on anticoagulation), hyperlipidemia, diet-controlled type 2 diabetes mellitus, and hypertension who presented to the Emergency Department on 6/26/17 with left upper extremity cramping, stiffness, and shaking with concern for cerebrovascular accident.  MRI unremarkable for acute process at this time.  Admitted under observation to the neuro floor for further evaluation and treatment of possible transient ischemic attack  versus seizure-like activity.  Vitals currently WNL. Pt currently stable.     No acute events reported overnight by nursing staff. Afebrile. Slept well last night. Eating well. Accompanied by wife at bedside. Seen by Neuro earlier today. Anxious to discharge. Agreeable to home with RN and OT. Wife and him plan to attend balance classes at current [facility]. He rarely uses walker at home because him and wife are afraid he will become dependent on it. Encouraged use for fall prevention.     Hospital Course   Ernie Leary was admitted on 6/26/2017.  The following problems were addressed during his hospitalization:    Left upper extremity shaking, cramping, and stiffness; likely representing partial seizure:  Patient with a very similar presentation and admission in 2013.  Awoke around 5:00 AM on day of admission and felt that his left hand specifically was frozen and he had a cramping sensation and stiffness.  He also felt that his hand was shaking and had more profound  numbness.  Baseline numbness in his left upper extremity from his prior intracerebral hemorrhage.  This persisted, which prompted him to call EMS.  Workup showed an unremarkable CMP and CBC.  MRI of the brain showed an abnormal enhancement in the right parietal lobe where there was a previous hematoma in 2012, which indicates persistent breakdown of the blood-brain barrier, but the etiology is nonspecific. Also, encephalomalacia gliosis and paramagnetic effect in the right parietal lobe from old hemorrhage--when compared to prior MRI, no significant change noted.  Patient with history of amyloid angiopathy and right parietal intracerebral hemorrhage in 2012 with associated left hemiparesis, left visual field cut, and impaired mobility which has since been regained.  Also felt some increasing ataxia earlier on day of admission.  Diagnosed with a seizure disorder in the past relative to his prior intracerebral hemorrhage and was previously on Keppra, but took himself off.  In the ED, the case was presented to Dr. Duff of Neurology who had recommended reinitiation of Keppra 500 mg b.i.d. and discharge with followup with Neurology in the outpatient setting; however, the patient was unable to perform a roadside test and thus admission was sought. Tele without overt abnormality noted overnight. CBC and BMP unremarkable.   -- Neurology consulted; appreciate assistance greatly. See notes by Dr. Adamson--reinitiated Keppra 500 mg BID, no further neurological workup needed. Follow-up with Dr. Alcantar in one month  -- Care Coordinator consulted to establish Neurology follow-up prior to discharge, appreciate assistance with establishing follow-up with PCP as well (Dr. Ashraf)   -- Prescription for Keppra 500 mg po BID sent at discharge to pt's pharmacy of choice ([facility])  -- OT evaluated, recommending 24 hour supervision from wife, use of AD at all times home RN for med mgmt and home OT   -- Home RN and OT ordered at  discharge   -- Pt encouraged to utilize walker as much as possible, also plans on attending balance classes at current facility     Hypertension:  Continue PTA Amlodipine, Monopril, and Metoprolol at discharge     Diet-controlled type 2 diabetes mellitus:  Patient not currently on any medications.  A1C 6.5.  -- Defer further management to PCP     Recent Labs  Lab 06/27/17  0744 06/27/17  0218 06/26/17  1950 06/26/17  1244 06/26/17  0904   *  --   --   --  168*   BGM  --  102* 187* 129*  --      Hyperlipidemia:  Lipid profile 177/123/42/58. Patient not maintained on a statin, defer further management to primary care provider.      Atrial fibrillation/flutter, status post ablation:  Patient not on anticoagulation at this time.  No active chest pain, shortness of breath or palpitations.  No recent syncopal events.   -- Defer to PCP     Delores Owusu PA-C    This patient was discussed with Dr. Duke of the Hospitalist Service who agrees with current plans as outlined above.     Significant Results and Procedures   See below     Pending Results   These results will be followed up by PCP  Unresulted Labs Ordered in the Past 30 Days of this Admission     Date and Time Order Name Status Description    6/26/2017 0904 Vitamin D Deficiency In process           Code Status   Full Code       Primary Care Physician   Forest Ashraf    Physical Exam   Temp: 97.6  F (36.4  C) Temp src: Oral BP: 136/82   Heart Rate: 52 Resp: 16 SpO2: 100 % O2 Device: None (Room air)    Vitals:    06/26/17 0904 06/27/17 0611   Weight: 73.9 kg (163 lb) 72.9 kg (160 lb 12.8 oz)     Vital Signs with Ranges  Temp:  [97.4  F (36.3  C)-98.1  F (36.7  C)] 97.6  F (36.4  C)  Heart Rate:  [52-69] 52  Resp:  [14-16] 16  BP: (103-170)/(55-97) 136/82  SpO2:  [94 %-100 %] 100 %  I/O last 3 completed shifts:  In: 350 [P.O.:350]  Out: -     CONSTITUTIONAL:  The patient lying in bed, dressed in hospital garb.  Appears comfortable.  Cooperative  with interview. Accompanied by wife at bedside.     HEENT:  Normocephalic, atraumatic.  Negative for conjunctival redness or scleral icterus.    CARDIOVASCULAR:  Regular rate and rhythm; no murmurs, rubs or extra heart sounds appreciated.  Pulses +2/4 and regular in upper and lower extremities bilaterally.   RESPIRATORY:  No increased work of breathing.  No supplemental oxygen at this time.   LUNGS:  Clear to auscultation bilaterally; no wheezes, rales or rhonchi appreciated.   GASTROINTESTINAL:  Abdomen soft, nondistended.  Bowel sounds auscultated in all 4 quadrants.  Negative for tenderness to palpation.   MUSCULOSKELETAL:  No gross deformities noted.  Normal muscle tone.    LYMPHATICS:  Negative for lower extremity edema bilaterally.   NEUROLOGIC:   strength intact.  Alert and oriented to person, place and time.  Cranial nerves 2-12 grossly intact.  Cerebellar function intact per rapid hand movement testing.  Sensation slightly diminished in left upper extremity compared to right upper extremity which patient reports is his baseline.   SKIN:  Warm, dry, intact.  No jaundice noted.  Negative for suspicious lesions, rashes, bruising, open sores or abrasions.      Discharge Disposition   Discharged to home  Condition at discharge: Stable    Consultations This Hospital Stay   NEUROLOGY IP CONSULT  PHYSICAL THERAPY ADULT IP CONSULT  OCCUPATIONAL THERAPY ADULT IP CONSULT  SOCIAL WORK IP CONSULT  CARE COORDINATOR IP CONSULT    Time Spent on this Encounter   I, Delores Owusu, personally saw the patient today and spent greater than 30 minutes discharging this patient.    Discharge Orders   No discharge procedures on file.  Discharge Medications   Current Discharge Medication List      START taking these medications    Details   levETIRAcetam (KEPPRA) 500 MG tablet Take 1 tablet (500 mg) by mouth 2 times daily  Qty: 60 tablet, Refills: 0         CONTINUE these medications which have NOT CHANGED    Details    COENZYME Q-10 PO Take 100 mg by mouth daily      omega-3 acid ethyl esters (LOVAZA) 1 G capsule Take 1 g by mouth daily      vitamin B complex with vitamin C (VITAMIN  B COMPLEX) TABS tablet Take 1 tablet by mouth daily      Cholecalciferol (VITAMIN D) 2000 UNITS tablet Take 2,000 Units by mouth daily  Qty: 100 tablet, Refills: 3    Associated Diagnoses: Recurrent falls      fluticasone (FLONASE) 50 MCG/ACT spray Spray 2 sprays into both nostrils daily  Qty: 1 Bottle, Refills: 1    Associated Diagnoses: Chronic rhinitis      amLODIPine (NORVASC) 2.5 MG tablet Take 1 tablet (2.5 mg) by mouth 2 times daily  Qty: 180 tablet, Refills: 3    Associated Diagnoses: Essential hypertension, benign      fosinopril (MONOPRIL) 40 MG tablet take 1/2 tablet by mouth in the morning and 1 tab in the evening  Qty: 135 tablet, Refills: 3    Associated Diagnoses: Essential hypertension, benign      metoprolol (LOPRESSOR) 50 MG tablet Take one-half tablet by mouth twice daily  Qty: 90 tablet, Refills: 3    Associated Diagnoses: Essential hypertension, benign      CYANOCOBALAMIN PO Take 1,000 mcg by mouth daily.      psyllium (METAMUCIL) 58.6 % POWD Take 1 teaspoonful by mouth 3 times daily as needed. Constipation.      prune juice LIQD Take  by mouth daily (with breakfast).      calcium 600 MG tablet Take 1 tablet by mouth daily   Qty: 180 tablet, Refills: 3      magnesium hydroxide (MILK OF MAGNESIA) 400 MG/5ML suspension Take 30 mLs by mouth daily as needed. Constipation.      calcium carbonate (TUMS) 500 MG chewable tablet Take 1 chew tab by mouth daily as needed.           Allergies   Allergies   Allergen Reactions     Adrenalin [Epinephrine Hcl]      Hctz      hyponatremia     Data   Most Recent 3 CBC's:  Recent Labs   Lab Test  06/27/17   0744  06/26/17   0904  05/04/17   1508   WBC  5.6  4.7  5.7   HGB  12.2*  12.7*  12.0*   MCV  89  89  91   PLT  192  207  249      Most Recent 3 BMP's:  Recent Labs   Lab Test  06/27/17   0744   06/26/17   0904  05/04/17   1508   NA  138  135  137   POTASSIUM  4.4  3.7  4.5   CHLORIDE  104  101  103   CO2  28  25  26   BUN  16  15  24   CR  0.98  0.95  1.21   ANIONGAP  6  9  8   SOCORRO  8.6  8.6  9.2   GLC  104*  168*  157*     Most Recent 2 LFT's:  Recent Labs   Lab Test  06/26/17   0904  05/04/17   1508   AST  15  17   ALT  18  19   ALKPHOS  63  72   BILITOTAL  0.6  0.5     Most Recent INR's and Anticoagulation Dosing History:  Anticoagulation Dose History     Recent Dosing and Labs Latest Ref Rng & Units 2/11/2008 2/18/2008 2/21/2008 2/25/2008 2/29/2008 11/3/2012 7/12/2013    INR 0.86 - 1.14 - - - - 1.13 1.04 1.01    INR - 2.7 3.2 3.5 1.7 - - -        Most Recent 3 Troponin's:  Recent Labs   Lab Test  07/12/13   2310  07/12/13   1645  07/12/13   1312  11/03/12   2115   TROPI  <0.012  <0.012   --   0.018   TROPONIN   --    --   0.00   --      Most Recent Cholesterol Panel:  Recent Labs   Lab Test  06/26/17   0904   CHOL  177   LDL  123*   HDL  42   TRIG  58     Most Recent 6 Bacteria Isolates From Any Culture (See EPIC Reports for Culture Details):  Recent Labs   Lab Test  11/10/12   0920  12/09/11   2150  06/07/10   1440   CULT  10 to 50,000 colonies/mL Coagulase negative Staphylococcus <10,000 colonies/mL Strain 2 Coagulase negative Staphylococcus  50 to 100,000 colonies/mL Pseudomonas aeruginosa Sent final completed report to [facility] printer on 12.12.11 / [place]  No Salmonella, Shigella, Campylobacter or E coli 0157 isolated.     Most Recent TSH, T4 and A1c Labs:  Recent Labs   Lab Test  06/26/17   0904   TSH  1.18   A1C  6.5*     Results for orders placed or performed during the hospital encounter of 06/26/17   MR Brain w/o & w Contrast    Addendum: 6/26/2017    ALEX MCALLISTER    Accession # [id number]    The original report on this patient was dictated by me.      Comparison is now made with previous MRI from [facility] of 8/21/2015. The enhancing abnormality in the right  parietal lobe is  unchanged in size and appearance since the previous  exam, although this is not mentioned in the report of the previous  exam.    BILL PIERCE MD (Date of Addendum: 6/26/2017 3:46 PM)        BILL PIERCE MD      Narrative    MRI BRAIN WITHOUT AND WITH CONTRAST  6/26/2017 10:29 AM    HISTORY:  Transient left arm/leg loss of control, numbness.     TECHNIQUE:  Multiplanar, multisequence MRI of the brain without and  with 7 mL Gadavist.    COMPARISON: CT scans 7/12/2013 and 11/3/2012. MRI 11/5/2012.    FINDINGS:  There is generalized atrophy of the brain. White matter T2  hyperintensities are seen in the cerebral hemispheres consistent with  sequelae of small vessel ischemic disease. Encephalomalacia and  gliosis are seen in the right parietal lobe, unchanged. These are  associated with extensive paramagnetic effect from the previous  hematoma in this area in 2012.  There is no evidence of acute  hemorrhage, mass, acute infarct, or anomaly.  Since the previous exam,  a region of poorly marginated abnormal gadolinium enhancement has  developed where the hemorrhage began, about 1.7 x 1.5 x 1.2 cm  diameter, not visible on the previous MRI.    The facial structures appear normal. The arteries at the base of the  brain and the dural venous sinuses appear patent.       Impression    IMPRESSION:   1. Abnormal enhancement in the right parietal lobe where there was a  previous hematoma in 2012. 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 Possible causes  include a vascular malformation such as cavernous hemangioma and  neoplasm. Follow-up exam in one to two months is advised.  2. 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  3. Brain atrophy and white matter changes consistent with sequelae of  small vessel ischemic disease.    BILL PIERCE MD   US Carotid Bilateral    Narrative    US CAROTID BILATERAL   6/26/2017  5:40 PM     HISTORY:transient left side weakness    COMPARISON: None.    RIGHT CAROTID FINDINGS:  There is moderate amount of calcified  atherosclerotic plaque in the carotid bifurcation.     Right ICA PSV:  66  cm/sec.  Right ICA EDV:  20 cm/sec.  Right ICA/CCA PSV Ratio:  1.32      These indicate less than 50% diameter stenosis of the right ICA  relative to the distal ICA.      Right Vertebral: Antegrade flow.   Right ECA: Antegrade flow.      LEFT CAROTID FINDINGS: There is moderate amount of calcified  atherosclerotic plaque in the carotid bifurcation.     Left ICA PSV:  68  cm/sec.  Left ICA EDV:  20 cm/sec.  Left ICA/CCA PSV Ratio:  1.07      These indicate less than and 50% diameter stenosis of the left ICA  relative to the distal ICA.     Left Vertebral: Antegrade flow.   Left ECA: Antegrade flow.     Causes of Decreased Accuracy:  None.       Impression    IMPRESSION:  Less than 50% stenosis at both carotid bifurcations.    LOVE HENRY MD

## 2017-06-27 NOTE — PROGRESS NOTES
Paged regarding metoprolol dosing.    Patient was given metoprolol 50 mg this evening prior to pharmacy adjustment to metoprolol 25 mg. Paged by nursing for recommendations. BP stable, HR stable, patient asymptomatic.    Hold evening dose of fosinopril. Continue to monitor patient on telemetry this evening.    Lynn Pleitez PA-C  Internal Medicine Hospitalist

## 2017-06-27 NOTE — PLAN OF CARE
Problem: Goal Outcome Summary  Goal: Goal Outcome Summary  OT: Eval complete and Tx initiated. Pt admitted under observation for possible seizure. Prior to admit, pt lives in condo with wife and reports I with ADLs and med mgmt.     Discharge Planner OT   Patient plan for discharge: Home today  Current status: Pt required min A for functional transfers, retrieval of ADL items, and LE dressing due to impaired safety and balance.   Barriers to return to prior living situation: Prefers to use stairs into condo rather than elevator; Lack of insight into balance deficit  Recommendations for discharge: Home with 24 hr supervision from wife, use of AD at all times, home RN for med mgmt, and home OT. If 24 hr supervision cannot be provided, recommend TCU at discharge.   Rationale for recommendations: Safety concern and fall risk; Ongoing OT at home to further assess safety with IADLs, such as med mgmt       Entered by: Marcy Collier 06/27/2017 9:55 AM

## 2017-06-27 NOTE — PROVIDER NOTIFICATION
MD Notification     Notified Person:  MD      Notified Persons Name:Lynn Pleitez     Notification Date/Time: 06/26/17 2118     Notification Interaction:  Talked with Physician     Purpose of Notification:Metroprolol orginally ordered as 50mg BID, then after given medication at 2040, pharmacy changed ordered from 50mg BID to 25mg BID.      Orders Received:    Hold evening dose of fosinopril. Continue to monitor patient on telemetry this evening. Do not give new 25mg metoprolol dose tonight     Comments:

## 2017-06-27 NOTE — PLAN OF CARE
Problem: Goal Outcome Summary  Goal: Goal Outcome Summary  Outcome: No Change    [id number] Patient A/Ox4 first assessment, second assessment disoriented to time and lethargic. Bradycardic at times  other VSS on RA.  Patient denies pain, SOB, nausea, numbness/tingling. Skin is blotchy. Tele: Sinus [place]. Neuro's intact. , 102 no coverage ordered. IV:SL. Patient tolerating regular diet. Up with SBA. Declined PCDs. Will continue to [facility]

## 2017-06-27 NOTE — PROGRESS NOTES
I had a lengthy visit with the patient and his wife, this morning.  He says he slept very well last night, better than usual.  I indicated that may be in part related to the Keppra.  He does not feel sluggish this morning.  His appetite is good.    The carotid ultrasound does not suggest any significant stenosis.  The MRI of the brain, is unchanged from 2 years ago, which would argue against anything worrisome in the region of enhancement.    I again suspect he had a small seizure.  We again reviewed the rationale for that possibility.  I indicated sometimes seizures can arise several years after a stroke.  Sometimes, we are not sure what may have triggered it.    Since he seems to be back to baseline, I don't think any further neurological workup is needed.  I am recommending he continue Keppra 500 mg twice a day.  If he has any recurrent symptoms, they should still plan on coming to the hospital to have them checked out.    From my standpoint, he could be discharged home, if therapies agree.  He could follow up with Dr. Alcantar in a month.

## 2017-06-27 NOTE — PLAN OF CARE
Problem: Goal Outcome Summary  Goal: Goal Outcome Summary  Outcome: Adequate for Discharge Date Met:  06/27/17  A/O. VSS except SB, AM metoprolol held. Denies pain. Sz precautions, no Sz activity noted. IV dc'd. Tolerating Regular diet. Up with SBA and walker. Discharged to home with wife. Education completed. Sent with belongings.

## 2017-06-27 NOTE — PLAN OF CARE
Problem: Goal Outcome Summary  Goal: Goal Outcome Summary  PT: Order received; Patient under Observation cares for a possible seizure; At baseline patient lives with his spouse in a condo. Has residual L sided weakness and cognitive deficits from prior stroke. Wife able to provide min A or less as needed and supervision.  Discharge Planner PT   Patient plan for discharge: Home with assist of spouse  Current status: SBA for transfers with or without a walker; CGA/SBA for gait without a walker; SBA with a walker; need for safety cues secondary to impulsiveness and decreased awareness of balance deficits; appears at or near baseline  Barriers to return to prior living situation: none; will need supervision  Recommendations for discharge: Return home with assist of spouse and 24/7 supervision for mobility with use of 4WW at home for improved gait stability  Rationale for recommendations: mild unsteadiness without UE support; decreased insight into balance deficits; decreased safety       Entered by: Kalli Card 06/27/2017 11:11 AM

## 2017-06-28 NOTE — PLAN OF CARE
Problem: Goal Outcome Summary  Goal: Goal Outcome Summary  Occupational Therapy Discharge Summary     Reason for therapy discharge:    Discharged to home with home therapy.     Progress towards therapy goal(s). See goals on Care Plan in [facility] health record for goal details.  Goals not met.  Barriers to achieving goals:   discharge from facility.     Therapy recommendation(s):    Continued therapy is recommended.  Rationale/Recommendations:  home with recommended 24 hr S and A with ADL/IADl's and home OT for home safety eval and home RN for med mgmt. .

## 2017-06-29 ENCOUNTER — TELEPHONE (OUTPATIENT)
Dept: FAMILY MEDICINE | Facility: CLINIC | Age: 82
End: 2017-06-29

## 2017-06-29 NOTE — TELEPHONE ENCOUNTER
Pt seen today by [facility] RN for homecare assessment. Pt NOT admitted to homecare r/t NOT homebound. Pt and wife want to elect outpatient PT/OT if needed.   VSS in the home stable, /54 an HR 56 HT RR and Lungs clear.   Meds in the home.     Did give name to company at [facility] company [employer] for PT/OT assessment if provider approves or patient would like therapy in your building.     Thank you for th referral  Aurora Evans RN BSN   [phone]

## 2017-06-30 ENCOUNTER — OFFICE VISIT (OUTPATIENT)
Dept: FAMILY MEDICINE | Facility: CLINIC | Age: 82
End: 2017-06-30
Payer: MEDICARE

## 2017-06-30 VITALS
HEART RATE: 61 BPM | SYSTOLIC BLOOD PRESSURE: 152 MMHG | BODY MASS INDEX: 25.11 KG/M2 | OXYGEN SATURATION: 97 % | WEIGHT: 160 LBS | DIASTOLIC BLOOD PRESSURE: 75 MMHG | HEIGHT: 67 IN | TEMPERATURE: 96.8 F

## 2017-06-30 DIAGNOSIS — Z09 HOSPITAL DISCHARGE FOLLOW-UP: ICD-10-CM

## 2017-06-30 DIAGNOSIS — I63.89 CEREBROVASCULAR ACCIDENT (CVA) DUE TO OTHER MECHANISM (H): Primary | ICD-10-CM

## 2017-06-30 DIAGNOSIS — G40.909 SEIZURE DISORDER (H): ICD-10-CM

## 2017-06-30 PROCEDURE — 99214 OFFICE O/P EST MOD 30 MIN: CPT | Performed by: NURSE PRACTITIONER

## 2017-06-30 NOTE — PROGRESS NOTES
SUBJECTIVE:                                                    Ernie Leary is a 89 year old male who presents to clinic today for the following health issues:          Hospital Follow-up Visit:    Hospital/Nursing Home/IP Rehab Facility: [facility]  Date of Admission: **6/26/2017     Date of Discharge: 6/27/2017   REASON for admission  Ernie Leary is a 89-year-old gentleman with past medical history of right parietal intracerebral hemorrhage, amyloid angiopathy, seizure disorder (not currently on medications), atrial fibrillation status post ablation (not on anticoagulation), hyperlipidemia, diet-controlled type 2 diabetes mellitus, and hypertension who presented to the Emergency Department on 6/26/17 with left upper extremity cramping, stiffness, and shaking with concern for cerebrovascular accident.  MRI unremarkable for acute process at this time.  Admitted under observation to the neuro floor for further evaluation and treatment of possible transient ischemic attack  versus seizure-like activity.  Vitals currently WNL. Pt currently stable.      No acute events reported overnight by nursing staff. Afebrile. Slept well last night. Eating well. Accompanied by wife at bedside. Seen by Neuro earlier today. Anxious to discharge. Agreeable to home with RN and OT. Wife and him plan to attend balance classes at current senior coop. He rarely uses walker at home because him and wife are afraid he will become dependent on it. Encouraged use for fall prevention.               Problems taking medications regularly:  None       Medication changes since discharge: added Keppra 500mg twice a day       Problems adhering to non-medication therapy:  None    Summary of hospitalization:  [facility] discharge summary reviewed  Diagnostic Tests/Treatments reviewed.  Follow up needed: neuro; to be seen my Uyen in 3 weeks  Other Healthcare Providers Involved in Patient s Care: [facility] ;  "evaluate for OT PT through [facility] their residence.  They feel he doesn't need in home care  Update since discharge: stable. Doing \"better every day\"    Post Discharge Medication Reconciliation: discharge medications reconciled, continue medications without change.  Plan of care communicated with patient and wife     Coding guidelines for this visit:  Type of Medical   Decision Making Face-to-Face Visit       within 7 Days of discharge Face-to-Face Visit        within 14 days of discharge   Moderate Complexity [id number]   High Complexity [id number]            Problem list and histories reviewed & adjusted, as indicated.  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)     Transient cerebral ischemia     Cerebral amyloid angiopathy (H)     History of intracranial hemorrhage     Need for prophylactic vaccination and inoculation against influenza     TIA (transient ischemic attack)     Past Surgical History:   Procedure Laterality Date     ------------OTHER-------------  1/1/2008    Cardiac ablation     ADJACENT TISSUE TRANSFER, FOREHEAD/CHEEKS/CHIN/MOUTH/NECK/AXILLAE/GENITALIA/HANDS/FEET, < OR = 10CM       ARTHROTOMY SHOULDER, ROTATOR CUFF REPAIR, COMBINED  11/18/2011    Procedure:COMBINED ARTHROTOMY SHOULDER, ROTATOR CUFF REPAIR; LEFT SHOULDER OPEN ROTATOR CUFF REPAIR, BURSECTOMY, DISTAL CLAVICLE RESECTION; Surgeon:KHALIDA ZABALA; Location: OR     BIOPSY      multiple skin biopsies for skin cancers "     CARDIAC SURGERY       CYSTOSCOPY, TRANSURETHRAL RESECTION (TUR) PROSTATE, COMBINED  8/15/2012    Procedure: COMBINED CYSTOSCOPY, TRANSURETHRAL RESECTION (TUR) PROSTATE;  CYSTOSCOPY, TRANSURETHRAL RESECTION  OF  PROSTATE WITH PLASMA LOOP;  Surgeon: Harmeet Kilgore MD;  Location:  OR     HERNIORRHAPHY INGUINAL Right 5/29/2015    Procedure: HERNIORRHAPHY INGUINAL;  Surgeon: Scottie Nunn MD;  [facility]       Social History   Substance Use Topics     Smoking status: Former Smoker     Packs/day: 2.00     Years: 30.00     Types: Cigarettes     Quit date: 1/1/1981     Smokeless tobacco: Never Used     Alcohol use No     No family history on file.      Current Outpatient Prescriptions   Medication Sig Dispense Refill     levETIRAcetam (KEPPRA) 500 MG tablet Take 1 tablet (500 mg) by mouth 2 times daily 60 tablet 0     COENZYME Q-10 PO Take 100 mg by mouth daily       omega-3 acid ethyl esters (LOVAZA) 1 G capsule Take 1 g by mouth daily       vitamin B complex with vitamin C (VITAMIN  B COMPLEX) TABS tablet Take 1 tablet by mouth daily       Cholecalciferol (VITAMIN D) 2000 UNITS tablet Take 2,000 Units by mouth daily 100 tablet 3     fluticasone (FLONASE) 50 MCG/ACT spray Spray 2 sprays into both nostrils daily 1 Bottle 1     amLODIPine (NORVASC) 2.5 MG tablet Take 1 tablet (2.5 mg) by mouth 2 times daily 180 tablet 3     fosinopril (MONOPRIL) 40 MG tablet take 1/2 tablet by mouth in the morning and 1 tab in the evening 135 tablet 3     metoprolol (LOPRESSOR) 50 MG tablet Take one-half tablet by mouth twice daily 90 tablet 3     CYANOCOBALAMIN PO Take 1,000 mcg by mouth daily.       psyllium (METAMUCIL) 58.6 % POWD Take 1 teaspoonful by mouth 3 times daily as needed. Constipation.       magnesium hydroxide (MILK OF MAGNESIA) 400 MG/5ML suspension Take 30 mLs by mouth daily as needed. Constipation.       calcium carbonate (TUMS) 500 MG chewable tablet Take 1 chew tab by mouth daily as needed.       prune  "juice LIQD Take  by mouth daily (with breakfast).       calcium 600 MG tablet Take 1 tablet by mouth daily  180 tablet 3     Allergies   Allergen Reactions     Adrenalin [Epinephrine Hcl]      Hctz      hyponatremia       Reviewed and updated as needed this visit by clinical staff  Tobacco  Allergies  Meds  Soc Hx      Reviewed and updated as needed this visit by Provider         ROS:  Constitutional, HEENT, cardiovascular, pulmonary, gi and gu systems are negative, except as otherwise noted.  NEURO: no seizure activity since discharge on 6/26/17  OBJECTIVE:     /75  Pulse 61  Temp 96.8  F (36  C)  Ht 5' 7\" (1.702 m)  Wt 160 lb (72.6 kg)  SpO2 97%  BMI 25.06 kg/m2  Body mass index is 25.06 kg/(m^2).  GENERAL: healthy, alert and no distress  EYES: Eyes grossly normal to inspection, PERRL and conjunctivae and sclerae normal  NECK: no adenopathy, no asymmetry, masses, or scars and thyroid normal to palpation  RESP: lungs clear to auscultation - no rales, rhonchi or wheezes  CV: regular rate and rhythm, normal S1 S2, no S3 or S4, no murmur, click or rub, pedal pulses strong  ABDOMEN: soft, nontender, no hepatosplenomegaly, no masses and bowel sounds normal  MS: no gross musculoskeletal defects noted, no edema  NEURO: General weakness and balance deficit, mentation mild dementia and speech normal,   Diagnostic Test Results:  none     ASSESSMENT/PLAN:         ICD-10-CM    1. Cerebrovascular accident (CVA) due to other mechanism (H) I63.8 PHYSICAL THERAPY REFERRAL     OCCUPATIONAL THERAPY REFERRAL   2. Seizure disorder (H) G40.909 Handicap tag paperwork provided   3. Hospital discharge follow-up Z09        [facility]    "

## 2017-06-30 NOTE — MR AVS SNAPSHOT
"              After Visit Summary   6/30/2017    Ernie Leary    MRN: [id number]           Patient Information     Date Of Birth          9/23/1924        Visit Information        Provider Department      6/30/2017 10:30 AM Yamilex Blevins APRN [facility]        Today's Diagnoses     Cerebrovascular accident (CVA) due to other mechanism (H)    -  1    Seizure disorder (H)        Hospital discharge follow-up           Follow-ups after your visit        Additional Services     OCCUPATIONAL THERAPY REFERRAL       *This therapy referral will be filtered to a centralized scheduling office at [facility] and the patient will receive a call to schedule an appointment at a [place] location most convenient for them. *     [facility] provides Occupational Therapy evaluation and treatment and many specialty services across the [place] system.  If requesting a specialty program, please choose from the list below.    If you have not heard from the scheduling office within 2 business days, please call [phone] for all locations, with the exception of [place], please call [phone].     Treatment: Evaluation & Treatment  Special Instructions/Modalities:   Special Programs: please evaluate need    Please be aware that coverage of these services is subject to the terms and limitations of your health insurance plan.  Call member services at your health plan with any benefit or coverage questions.      **Note to Provider:  If you are referring outside of [place] for the therapy appointment, please list the name of the location in the \"special instructions\" above, print the referral and give to the patient to schedule the appointment.            PHYSICAL THERAPY REFERRAL       *This therapy referral will be filtered to a centralized scheduling office at [facility] and the patient will receive a call to schedule an appointment at a [facility]" "location most convenient for them. *     [facility] provides Physical Therapy evaluation and treatment and many specialty services across the [place] system.  If requesting a specialty program, please choose from the list below.    If you have not heard from the scheduling office within 2 business days, please call [phone] for all locations, with the exception of [place], please call [phone].  Treatment: Evaluation & Treatment  Special Instructions/Modalities:   Special Programs: balance and strength    Please be aware that coverage of these services is subject to the terms and limitations of your health insurance plan.  Call member services at your health plan with any benefit or coverage questions.      **Note to Provider:  If you are referring outside of [place] for the therapy appointment, please list the name of the location in the \"special instructions\" above, print the referral and give to the patient to schedule the appointment.                  Your next 10 appointments already scheduled     Jul 21, 2017 11:00 AM CDT   [facility]    [address] [facility] [address]   [phone]           Bring a current list of meds and any records pertaining to this visit.  For Physicals, please bring immunization records and any forms needing to be filled out.  Please arrive 10 minutes early to complete paperwork.            Jul 24, 2017   Procedure with Yoel Calero MD   [facility] (--)    [address]  [facility] [address]   [phone]            Aug 21, 2017   Procedure with Yoel Calero MD   [facility]    [address]  [facility] [address]   [phone]            Sep 06, 2017  1:30 PM CDT   [facility]   [facility]    [address] [facility] " "[place] [id number] [phone]           Bring a current list of meds and any records pertaining to this visit.  For Physicals, please bring immunization records and any forms needing to be filled out.  Please arrive 10 minutes early to complete paperwork.              Who to contact     If you have questions or need follow up information about today's clinic visit or your schedule please contact [facility] directly at [phone].  Normal or non-critical lab and imaging results will be communicated to you by Asantihart, letter or phone within 4 business days after the clinic has received the results. If you do not hear from us within 7 days, please contact the clinic through Tango or phone. If you have a critical or abnormal lab result, we will notify you by phone as soon as possible.  Submit refill requests through Tango or call your pharmacy and they will forward the refill request to us. Please allow 3 business days for your refill to be completed.          Additional Information About Your Visit        Tango Information     Tango gives you secure access to your electronic health record. If you see a primary care provider, you can also send messages to your care team and make appointments. If you have questions, please call your primary care clinic.  If you do not have a primary care provider, please call [phone] and they will assist you.        Care EveryWhere ID     This is your Care EveryWhere ID. This could be used by other organizations to access your [place] medical records  [id number]        Your Vitals Were     Pulse Temperature Height Pulse Oximetry BMI (Body Mass Index)       61 96.8  F (36  C) 5' 7\" (1.702 m) 97% 25.06 kg/m2        Blood Pressure from Last 3 Encounters:   06/30/17 152/75   06/27/17 104/62   05/04/17 160/81    Weight from Last 3 Encounters:   06/30/17 160 lb (72.6 kg)   06/27/17 160 lb 12.8 oz (72.9 kg)   05/04/17 166 lb (75.3 kg)              We Performed the " Following     OCCUPATIONAL THERAPY REFERRAL     PHYSICAL THERAPY REFERRAL        Primary Care Provider Office Phone # Fax #    Forest Ashraf [phone]       [facility] [address]  [facility] [address]        Equal Access to Services     CRISTA VAIL : Hadii alejandro ku haddianao Soomaali, waaxda luqadaha, qaybta kaalmada adeegyada, janice matthewn shayyangela de la rosa laXurosenda dupree. So [facility] [phone].    ATENCIÓN: Si habla español, tiene a crane disposición servicios gratuitos de asistencia lingüística. Llame al [phone].    We comply with applicable federal civil rights laws and [place] laws. We do not discriminate on the basis of race, color, national origin, age, disability sex, sexual orientation or gender identity.            Thank you!     Thank you for choosing [facility]  for your care. Our goal is always to provide you with excellent care. Hearing back from our patients is one way we can continue to improve our services. Please take a few minutes to complete the written survey that you may receive in the mail after your visit with us. Thank you!             Your Updated Medication List - Protect others around you: Learn how to safely use, store and throw away your medicines at www.disposemymeds.org.          This list is accurate as of: 6/30/17 11:26 AM.  Always use your most recent med list.                   Brand Name Dispense Instructions for use Diagnosis    amLODIPine 2.5 MG tablet    NORVASC    180 tablet    Take 1 tablet (2.5 mg) by mouth 2 times daily    Essential hypertension, benign       calcium 600 MG tablet     180 tablet    Take 1 tablet by mouth daily        calcium carbonate 500 MG chewable tablet    TUMS     Take 1 chew tab by mouth daily as needed.        COENZYME Q-10 PO      Take 100 mg by mouth daily        CYANOCOBALAMIN PO      Take 1,000 mcg by mouth daily.        fluticasone 50 MCG/ACT spray    FLONASE    1 Bottle    Spray 2 sprays into both  nostrils daily    Chronic rhinitis       fosinopril 40 MG tablet    MONOPRIL    135 tablet    take 1/2 tablet by mouth in the morning and 1 tab in the evening    Essential hypertension, benign       levETIRAcetam 500 MG tablet    KEPPRA    60 tablet    Take 1 tablet (500 mg) by mouth 2 times daily    Seizure disorder (H)       magnesium hydroxide 400 MG/5ML suspension    MILK OF MAGNESIA     Take 30 mLs by mouth daily as needed. Constipation.        metoprolol 50 MG tablet    LOPRESSOR    90 tablet    Take one-half tablet by mouth twice daily    Essential hypertension, benign       omega-3 acid ethyl esters 1 G capsule    Lovaza     Take 1 g by mouth daily        prune juice Liqd      Take  by mouth daily (with breakfast).        psyllium 58.6 % Powd    METAMUCIL     Take 1 teaspoonful by mouth 3 times daily as needed. Constipation.        vitamin B complex with vitamin C Tabs tablet      Take 1 tablet by mouth daily        vitamin D 2000 UNITS tablet     100 tablet    Take 2,000 Units by mouth daily    Recurrent falls

## 2017-07-21 ENCOUNTER — OFFICE VISIT (OUTPATIENT)
Dept: FAMILY MEDICINE | Facility: CLINIC | Age: 82
End: 2017-07-21
Payer: MEDICARE

## 2017-07-21 VITALS
HEIGHT: 67 IN | HEART RATE: 66 BPM | OXYGEN SATURATION: 97 % | SYSTOLIC BLOOD PRESSURE: 142 MMHG | DIASTOLIC BLOOD PRESSURE: 76 MMHG | BODY MASS INDEX: 26.81 KG/M2 | WEIGHT: 170.8 LBS | TEMPERATURE: 97.7 F

## 2017-07-21 DIAGNOSIS — G40.909 SEIZURE DISORDER (H): ICD-10-CM

## 2017-07-21 DIAGNOSIS — I10 ESSENTIAL HYPERTENSION, BENIGN: ICD-10-CM

## 2017-07-21 DIAGNOSIS — E85.4 CEREBRAL AMYLOID ANGIOPATHY (H): ICD-10-CM

## 2017-07-21 DIAGNOSIS — I48.92 ATRIAL FLUTTER, UNSPECIFIED TYPE (H): ICD-10-CM

## 2017-07-21 DIAGNOSIS — Z86.79 HISTORY OF INTRACRANIAL HEMORRHAGE: ICD-10-CM

## 2017-07-21 DIAGNOSIS — I68.0 CEREBRAL AMYLOID ANGIOPATHY (H): ICD-10-CM

## 2017-07-21 DIAGNOSIS — Z01.818 PREOP GENERAL PHYSICAL EXAM: Primary | ICD-10-CM

## 2017-07-21 PROCEDURE — 99215 OFFICE O/P EST HI 40 MIN: CPT | Performed by: INTERNAL MEDICINE

## 2017-07-21 RX ORDER — FOSINOPRIL SODIUM 40 MG/1
TABLET ORAL
Qty: 135 TABLET | Refills: 3 | Status: SHIPPED | OUTPATIENT
Start: 2017-07-21 | End: 2018-06-28

## 2017-07-21 RX ORDER — METOPROLOL TARTRATE 50 MG
TABLET ORAL
Qty: 90 TABLET | Refills: 3 | Status: SHIPPED | OUTPATIENT
Start: 2017-07-21 | End: 2018-06-28

## 2017-07-21 RX ORDER — AMLODIPINE BESYLATE 2.5 MG/1
2.5 TABLET ORAL 2 TIMES DAILY
Qty: 180 TABLET | Refills: 3 | Status: SHIPPED | OUTPATIENT
Start: 2017-07-21 | End: 2018-06-28

## 2017-07-21 NOTE — PROGRESS NOTES
[address] [address]  [phone]  Dept: [phone]    PRE-OP EVALUATION:  Today's date: 2017    Ernie Leary (: 1924) presents for pre-operative evaluation assessment as requested by Dr. Calero, Yoel MATTHEW MD.  He requires evaluation and anesthesia risk assessment prior to undergoing surgery/procedure for treatment of bilateral cataracts .  Proposed procedure: PHACOEMULSIFICATION Clear Cornea with Toric Intraocular lens implant Right and Left    Date of Surgery/ Procedure: Right on 2017 @ 12:30pm and Left on 2017 @ 10:00am  Time of Surgery/ Procedure:   Hospital/Surgical Facility: [facility]    Primary Physician: Forest Ashraf  Type of Anesthesia Anticipated: Local with MAC    Patient has a Health Care Directive or Living Will:  YES     1. Yes - Do you have a history of heart attack, stroke, stent, bypass or surgery on an artery in the head, neck, heart or legs?  Atrial fib/flutter ablation  2. NO - Do you ever have any pain or discomfort in your chest?  3. NO - Do you have a history of  Heart Failure?  4. NO - Are you troubled by shortness of breath when: walking on the level, up a slight hill or at night?  5. NO - Do you currently have a cold, bronchitis or other respiratory infection?  6. NO - Do you have a cough, shortness of breath or wheezing?  7. NO - Do you sometimes get pains in the calves of your legs when you walk?  8. NO - Do you or anyone in your family have previous history of blood clots?  9. NO - Do you or does anyone in your family have a serious bleeding problem such as prolonged bleeding following surgeries or cuts?  10. Yes - Have you ever had problems with anemia or been told to take iron pills?  Last hemoglobin was 12.2 on   11. NO - Have you had any abnormal blood loss such as black, tarry or bloody stools, or abnormal vaginal bleeding?  12. NO - Have you ever had a blood transfusion?  13. NO - Have you or any of your  relatives ever had problems with anesthesia?  14. NO - Do you have sleep apnea, excessive snoring or daytime drowsiness?  15. NO - Do you have any prosthetic heart valves?  16. NO - Do you have prosthetic joints?  17. NO - Is there any chance that you may be pregnant?        HPI:                                                      Brief HPI related to upcoming procedure: Progressive vision loss in both eyes that is now severe.  Found to have bilateral cataracts.     Recently discharged after seizure.  Placed back on anticonvulsant.  No seizure activity since hospital discharge.     He can walk up a flight stairs without chest pain or dyspnea.        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 Provider to review and confirm.       HTN (hypertension) 05/24/2013     Priority: Medium     Microalbuminuria 02/15/2013     Priority: Medium     IFG (impaired fasting glucose) 12/23/2012     Priority: Medium     Anemia 08/23/2012     Priority: Medium     BPH (benign prostatic hyperplasia) 08/15/2012     Priority: Medium     BPH loc w urin obs/LUTS 08/14/2012     Priority: Medium     Elevated glucose 07/04/2012     Priority: Medium     History of atrial flutter 12/10/2011     Priority: Medium     S/p ablation       Advanced directives, counseling/discussion 09/19/2011     Priority: Medium      Advance Directive Follow-up Visit    Ernie Leary presents for advanced care planning session accompanied by designated health care agent.  Reviewed definitions of advanced care planning and advance directive form, and informational handouts given to patient previously.  Patient voices understanding of advance care planning and advance directive form    Designated healthcare agent is identified. Healthcare agent s name is see below.  Designated healthcare agent concerns:  none.  My Hopes and Wishes reviewed and patient and designated healthcare agent are in agreement.  Finalized wishes are clear to both patient and designated healthcare agent: Yes  Patient and designated healthcare agent are aware that document may be changed at any time in the future.    Advanced directive form: completed at this visit.  Advanced directive form: verified with notary signature..  Original and two copies of advanced directive form given to patient copy sent to  for scanning into EMR and original given to patient and instructed to give copy to designated [facility] and non-[facility] physician where applicable    2nd visit, directive completed, notarized and documented.    Advance Directive Problem List Overview:   Name Relationship Phone    Primary Health Care Agent Alexsandra Carey Spouse  [phone]         Alternative Health Care Agent Nikhil Linton grandson [phone]  [phone]        11/14/2011 JORDI Graham LPN      Advance Directive Initial Visit  Ernie Leary presents in person for initial session regarding completion of advanced directive form. He was referred to the facilitator by provider.  He currently has no advance directive.  Plan:  Advanced directive form, healthcare agent information card, advanced care planning information booklet provided to patient.   Follow up meeting: pt will call to come back. Patient instructed to bring healthcare agent to this meeting.   9/19/11 JORDI Graham  LPN    Advance Care Planning:   Receipt of ACP document:  Received: POLST which was signed and dated by provider on 3/28/13.  Document not previously scanned.  Reviewed for validity as medical order and sent to be scanned.  Code Status reflects choices in most recent ACP document.  Confirmed/documented designated decision maker(s). See permanent comments section of demographics in clinical tab. View document(s) and details by clicking on code status.   POLST-DNR/DNI form reviewed, signed and documented, see form for specifics.  Added by Aleja Graham on 3/29/2013.             Hyperlipidemia LDL goal <130 02/07/2011     Priority: Medium     Normocytic anemia 03/02/2010     Priority: Medium     Diagnosis updated by automated process. Provider to review and confirm.       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     Priority: Medium     Essential hypertension, benign 06/02/2003     Priority: Medium      Past Medical History:   Diagnosis Date     Abnormal glucose      Atrial flutter (H)     ablation 2008     Diabetes mellitus (H)     not currently being treated, diet controlled at this time     Essential hypertension, benign      Hemorrhagic stroke (H) 2012     Other and unspecified hyperlipidemia      Sebaceous cyst      Seizure disorder (H) 7/21/2017     Stroke (H)      Syncope and collapse 11/15/01     Past Surgical History:   Procedure Laterality Date     ------------OTHER-------------  1/1/2008    Cardiac ablation     ADJACENT TISSUE TRANSFER, FOREHEAD/CHEEKS/CHIN/MOUTH/NECK/AXILLAE/GENITALIA/HANDS/FEET, < OR = 10CM       ARTHROTOMY SHOULDER, ROTATOR CUFF REPAIR, COMBINED  11/18/2011    Procedure:COMBINED ARTHROTOMY SHOULDER, ROTATOR CUFF REPAIR; LEFT SHOULDER OPEN ROTATOR CUFF REPAIR, BURSECTOMY, DISTAL CLAVICLE RESECTION; Surgeon:KHALIDA ZABALA; Location:[facility] OR     BIOPSY      multiple skin  biopsies for skin cancers     CARDIAC SURGERY       CYSTOSCOPY, TRANSURETHRAL RESECTION (TUR) PROSTATE, COMBINED  8/15/2012    Procedure: COMBINED CYSTOSCOPY, TRANSURETHRAL RESECTION (TUR) PROSTATE;  CYSTOSCOPY, TRANSURETHRAL RESECTION  OF  PROSTATE WITH PLASMA LOOP;  Surgeon: Harmeet Kilgore MD;  Location:  OR     HERNIORRHAPHY INGUINAL Right 5/29/2015    Procedure: HERNIORRHAPHY INGUINAL;  Surgeon: Scottie Nunn MD;  Location: [facility]     Current Outpatient Prescriptions   Medication Sig Dispense Refill     amLODIPine (NORVASC) 2.5 MG tablet Take 1 tablet (2.5 mg) by mouth 2 times daily 180 tablet 3     metoprolol (LOPRESSOR) 50 MG tablet Take one-half tablet by mouth twice daily 90 tablet 3     fosinopril (MONOPRIL) 40 MG tablet take 1/2 tablet by mouth in the morning and 1 tab in the evening 135 tablet 3     levETIRAcetam (KEPPRA) 500 MG tablet Take 1 tablet (500 mg) by mouth 2 times daily 60 tablet 0     COENZYME Q-10 PO Take 100 mg by mouth daily       omega-3 acid ethyl esters (LOVAZA) 1 G capsule Take 1 g by mouth daily       vitamin B complex with vitamin C (VITAMIN  B COMPLEX) TABS tablet Take 1 tablet by mouth daily       Cholecalciferol (VITAMIN D) 2000 UNITS tablet Take 2,000 Units by mouth daily 100 tablet 3     fluticasone (FLONASE) 50 MCG/ACT spray Spray 2 sprays into both nostrils daily 1 Bottle 1     CYANOCOBALAMIN PO Take 1,000 mcg by mouth daily.       psyllium (METAMUCIL) 58.6 % POWD Take 1 teaspoonful by mouth 3 times daily as needed. Constipation.       magnesium hydroxide (MILK OF MAGNESIA) 400 MG/5ML suspension Take 30 mLs by mouth daily as needed. Constipation.       calcium carbonate (TUMS) 500 MG chewable tablet Take 1 chew tab by mouth daily as needed.       prune juice LIQD Take  by mouth daily (with breakfast).       calcium 600 MG tablet Take 1 tablet by mouth daily  180 tablet 3     [DISCONTINUED] amLODIPine (NORVASC) 2.5 MG tablet Take 1 tablet (2.5 mg) by mouth 2 times daily 180  "tablet 3     [DISCONTINUED] fosinopril (MONOPRIL) 40 MG tablet take 1/2 tablet by mouth in the morning and 1 tab in the evening 135 tablet 3     [DISCONTINUED] metoprolol (LOPRESSOR) 50 MG tablet Take one-half tablet by mouth twice daily 90 tablet 3     OTC products: None, except as noted above    Allergies   Allergen Reactions     Adrenalin [Epinephrine Hcl]      Hctz      hyponatremia      Latex Allergy: NO    Social History   Substance Use Topics     Smoking status: Former Smoker     Packs/day: 2.00     Years: 30.00     Types: Cigarettes     Quit date: 1/1/1981     Smokeless tobacco: Never Used     Alcohol use No     History   Drug Use No     He used to work for the space [employer]    REVIEW OF SYSTEMS:                                                    Constitutional, neuro, ENT, endocrine, pulmonary, cardiac, gastrointestinal, genitourinary, musculoskeletal, integument and psychiatric systems are negative, except as otherwise noted.      EXAM:                                                    /76  Pulse 66  Temp 97.7  F (36.5  C) (Oral)  Ht 5' 7\" (1.702 m)  Wt 170 lb 12.8 oz (77.5 kg)  SpO2 97%  BMI 26.75 kg/m2    GENERAL APPEARANCE: healthy, alert and no distress     EYES: EOMI,  PERRL     HENT: ear canals and TM's normal and nose and mouth without ulcers or lesions     NECK: no adenopathy, no asymmetry, masses, or scars and thyroid normal to palpation     RESP: lungs clear to auscultation - no rales, rhonchi or wheezes     CV: regular rate and rhythm, no murmur     ABDOMEN:  soft, nontender, no HSM or masses and bowel sounds normal     MS: extremities normal- no gross deformities noted, no evidence of inflammation in joints, FROM in all extremities.     SKIN: no suspicious lesions or rashes     NEURO: Alert and oriented to person, place and time. Cranial nerves 2-12 appear grossly intact.   Left sided weakness and neglect noted.  He is not using a cane or walker, but I did recommend that he use a " "cane; he did fail the \"get up and go\" test      PSYCH: mentation appears normal. and affect normal/bright     LYMPHATICS: No axillary, cervical, or supraclavicular nodes    DIAGNOSTICS:                                                    No labs or EKG required for low risk surgery (cataract, skin procedure, breast biopsy, etc)    Recent Labs   Lab Test  06/27/17   0744  06/26/17   0904   07/12/13   1150   03/20/13   1008   11/03/12   2115   HGB  12.2*  12.7*   < >  12.2*   < >  12.5*   < >  12.6*   PLT  192  207   < >  258   < >  234   < >  246   INR   --    --    --   1.01   --    --    --   1.04   NA  138  135   < >  135   < >   --    < >  135   POTASSIUM  4.4  3.7   < >  4.1   < >   --    < >  3.9   CR  0.98  0.95   < >  0.98   < >   --    < >  0.89   A1C   --   6.5*   --    --    --   6.3*   < >   --     < > = values in this interval not displayed.        IMPRESSION:                                                    Reason for surgery/procedure: Cataracts  Diagnosis/reason for consult: Preoperative evaluation     The proposed surgical procedure is considered LOW risk.    REVISED CARDIAC RISK INDEX  The patient has the following serious cardiovascular risks for perioperative complications such as (MI, PE, VFib and 3  AV Block):  No serious cardiac risks  INTERPRETATION: 0 risks: Class I (very low risk - 0.4% complication rate)    The patient has the following additional risks for perioperative complications:    Advanced age  History of hemorrhagic stroke  Seizure disorder       ICD-10-CM    1. Preop general physical exam Z01.818    2. Atrial flutter, unspecified type (H) I48.92    3. Cerebral amyloid angiopathy (H) E85.4     I68.0    4. Seizure disorder (H) G40.909    5. Essential hypertension, benign I10 amLODIPine (NORVASC) 2.5 MG tablet     metoprolol (LOPRESSOR) 50 MG tablet     fosinopril (MONOPRIL) 40 MG tablet   6. History of intracranial hemorrhage Z86.79      With CAA and left hemineglect and weakness, " he should be using a cane or walker and discussed this with he and his wife    RECOMMENDATIONS:                                                        --Patient is to take all scheduled medications on the day of surgery    APPROVAL GIVEN to proceed with proposed procedure, without further diagnostic evaluation       Signed Electronically by: Forest Ashraf MD    Copy of this evaluation report is provided to requesting physician.    [facility] Preop Guidelines

## 2017-07-21 NOTE — MR AVS SNAPSHOT
After Visit Summary   7/21/2017    Ernie Leary    MRN: [id number]           Patient Information     Date Of Birth          9/23/1924        Visit Information        Provider Department      7/21/2017 11:00 AM Forest Ashraf MD [facility]        Today's Diagnoses     Preop general physical exam    -  1    Atrial flutter, unspecified type (H)        Cerebral amyloid angiopathy (H)        Seizure disorder (H)        Essential hypertension, benign        History of intracranial hemorrhage          Care Instructions      Before Your Surgery      Call your surgeon if there is any change in your health. This includes signs of a cold or flu (such as a sore throat, runny nose, cough, rash or fever).    Do not smoke, drink alcohol or take over the counter medicine (unless your surgeon or primary care doctor tells you to) for the 24 hours before and after surgery.    If you take prescribed drugs: Follow your doctor s orders about which medicines to take and which to stop until after surgery.    Eating and drinking prior to surgery: follow the instructions from your surgeon    Take a shower or bath the night before surgery. Use the soap your surgeon gave you to gently clean your skin. If you do not have soap from your surgeon, use your regular soap. Do not shave or scrub the surgery site.  Wear clean pajamas and have clean sheets on your bed.           Follow-ups after your visit        Follow-up notes from your care team     Return in about 3 months (around 10/21/2017) for Routine Visit.      Your next 10 appointments already scheduled     Jul 24, 2017   Procedure with Yoel Calero MD   [facility] (--)    [address]  [facility] [address]   [phone]            Aug 21, 2017   Procedure with Yoel Calero MD   [facility] (--)    [address]  [facility] [address]   [phone]            Sep 06, 2017  1:30 PM  "CDT   [facility] Visit with Forest Ashraf MD   [facility]    [address]  [facility] [address] [phone]           Bring a current list of meds and any records pertaining to this visit.  For Physicals, please bring immunization records and any forms needing to be filled out.  Please arrive 10 minutes early to complete paperwork.              Who to contact     If you have questions or need follow up information about today's clinic visit or your schedule please contact [facility] directly at [phone].  Normal or non-critical lab and imaging results will be communicated to you by MyChart, letter or phone within 4 business days after the clinic has received the results. If you do not hear from us within 7 days, please contact the clinic through [employer] or phone. If you have a critical or abnormal lab result, we will notify you by phone as soon as possible.  Submit refill requests through [employer] or call your pharmacy and they will forward the refill request to us. Please allow 3 business days for your refill to be completed.          Additional Information About Your Visit        MyChart Information     [employer] gives you secure access to your electronic health record. If you see a primary care provider, you can also send messages to your care team and make appointments. If you have questions, please call your primary care clinic.  If you do not have a primary care provider, please call [phone] and they will assist you.        Care EveryWhere ID     This is your Care EveryWhere ID. This could be used by other organizations to access your [place] [facility] medical records  [id number]        Your Vitals Were     Pulse Temperature Height Pulse Oximetry BMI (Body Mass Index)       66 97.7  F (36.5  C) (Oral) 5' 7\" (1.702 m) 97% 26.75 kg/m2        Blood Pressure from Last 3 Encounters:   07/21/17 142/76   06/30/17 152/75   06/27/17 104/62    Weight from Last 3 " Encounters:   07/21/17 170 lb 12.8 oz (77.5 kg)   06/30/17 160 lb (72.6 kg)   06/27/17 160 lb 12.8 oz (72.9 kg)              Today, you had the following     No orders found for display         Where to get your medicines      These medications were sent to [id number] - [place], [place] - [address]  [address] [place] [address]     Phone:  [phone]     amLODIPine 2.5 MG tablet    fosinopril 40 MG tablet    metoprolol 50 MG tablet          Primary Care Provider Office Phone # Fax #    Forest Ashraf [phone]       [facility] [address] [place] [address]        Equal Access to Services     CRISTA VAIL : Hadii alejandro lockwood hadasho Socapri, waaxda luqadaha, qaybta kaalmada adeegyada, janice samson . So [facility] [phone].    ATENCIÓN: Si habla español, tiene a crane disposición servicios gratuitos de asistencia lingüística. Llame al [phone].    We comply with applicable federal civil rights laws and [place] laws. We do not discriminate on the basis of race, color, national origin, age, disability sex, sexual orientation or gender identity.            Thank you!     Thank you for choosing [facility]  for your care. Our goal is always to provide you with excellent care. Hearing back from our patients is one way we can continue to improve our services. Please take a few minutes to complete the written survey that you may receive in the mail after your visit with us. Thank you!             Your Updated Medication List - Protect others around you: Learn how to safely use, store and throw away your medicines at www.disposemymeds.org.          This list is accurate as of: 7/21/17 11:39 AM.  Always use your most recent med list.                   Brand Name Dispense Instructions for use Diagnosis    amLODIPine 2.5 MG tablet    NORVASC    180 tablet    Take 1 tablet (2.5 mg) by mouth 2 times daily    Essential hypertension,  benign       calcium 600 MG tablet     180 tablet    Take 1 tablet by mouth daily        calcium carbonate 500 MG chewable tablet    TUMS     Take 1 chew tab by mouth daily as needed.        COENZYME Q-10 PO      Take 100 mg by mouth daily        CYANOCOBALAMIN PO      Take 1,000 mcg by mouth daily.        fluticasone 50 MCG/ACT spray    FLONASE    1 Bottle    Spray 2 sprays into both nostrils daily    Chronic rhinitis       fosinopril 40 MG tablet    MONOPRIL    135 tablet    take 1/2 tablet by mouth in the morning and 1 tab in the evening    Essential hypertension, benign       levETIRAcetam 500 MG tablet    KEPPRA    60 tablet    Take 1 tablet (500 mg) by mouth 2 times daily    Seizure disorder (H)       magnesium hydroxide 400 MG/5ML suspension    MILK OF MAGNESIA     Take 30 mLs by mouth daily as needed. Constipation.        metoprolol 50 MG tablet    LOPRESSOR    90 tablet    Take one-half tablet by mouth twice daily    Essential hypertension, benign       omega-3 acid ethyl esters 1 G capsule    Lovaza     Take 1 g by mouth daily        prune juice Liqd      Take  by mouth daily (with breakfast).        psyllium 58.6 % Powd    METAMUCIL     Take 1 teaspoonful by mouth 3 times daily as needed. Constipation.        vitamin B complex with vitamin C Tabs tablet      Take 1 tablet by mouth daily        vitamin D 2000 UNITS tablet     100 tablet    Take 2,000 Units by mouth daily    Recurrent falls

## 2017-07-21 NOTE — NURSING NOTE
"Chief Complaint   Patient presents with     Pre-Op Exam     Dr. Herr pt       Initial /84 (BP Location: Left arm, Patient Position: Chair, Cuff Size: Adult Regular)  Pulse 66  Temp 97.7  F (36.5  C) (Oral)  Ht 5' 7\" (1.702 m)  Wt 170 lb 12.8 oz (77.5 kg)  SpO2 97%  BMI 26.75 kg/m2 Estimated body mass index is 26.75 kg/(m^2) as calculated from the following:    Height as of this encounter: 5' 7\" (1.702 m).    Weight as of this encounter: 170 lb 12.8 oz (77.5 kg).  Medication Reconciliation: complete   Veronica Aly MA  "

## 2017-07-24 ENCOUNTER — HOSPITAL ENCOUNTER (OUTPATIENT)
Facility: CLINIC | Age: 82
Discharge: HOME | End: 2017-07-24
Attending: OPHTHALMOLOGY | Admitting: OPHTHALMOLOGY
Payer: MEDICARE

## 2017-07-24 ENCOUNTER — ANESTHESIA (OUTPATIENT)
Dept: SURGERY | Facility: CLINIC | Age: 82
End: 2017-07-24
Payer: MEDICARE

## 2017-07-24 ENCOUNTER — ANESTHESIA EVENT (OUTPATIENT)
Dept: SURGERY | Facility: CLINIC | Age: 82
End: 2017-07-24
Payer: MEDICARE

## 2017-07-24 VITALS
TEMPERATURE: 97.6 F | WEIGHT: 170 LBS | OXYGEN SATURATION: 98 % | BODY MASS INDEX: 26.63 KG/M2 | DIASTOLIC BLOOD PRESSURE: 79 MMHG | SYSTOLIC BLOOD PRESSURE: 105 MMHG | RESPIRATION RATE: 18 BRPM

## 2017-07-24 DIAGNOSIS — H25.011 CORTICAL AGE-RELATED CATARACT OF RIGHT EYE: Primary | ICD-10-CM

## 2017-07-24 PROCEDURE — 40000170 ZZH STATISTIC PRE-PROCEDURE ASSESSMENT II: Performed by: OPHTHALMOLOGY

## 2017-07-24 PROCEDURE — 25000125 ZZHC RX 250: Performed by: ANESTHESIOLOGY

## 2017-07-24 PROCEDURE — V2632 POST CHMBR INTRAOCULAR LENS: HCPCS | Performed by: OPHTHALMOLOGY

## 2017-07-24 PROCEDURE — 25000128 H RX IP 250 OP 636: Performed by: OPHTHALMOLOGY

## 2017-07-24 PROCEDURE — 25000128 H RX IP 250 OP 636: Performed by: NURSE ANESTHETIST, CERTIFIED REGISTERED

## 2017-07-24 PROCEDURE — 37000008 ZZH ANESTHESIA TECHNICAL FEE, 1ST 30 MIN: Performed by: OPHTHALMOLOGY

## 2017-07-24 PROCEDURE — 36000101 ZZH EYE SURGERY LEVEL 3 1ST 30 MIN: Performed by: OPHTHALMOLOGY

## 2017-07-24 PROCEDURE — 27210794 ZZH OR GENERAL SUPPLY STERILE: Performed by: OPHTHALMOLOGY

## 2017-07-24 PROCEDURE — 71000028 ZZH EYE RECOVERY PHASE 2 EACH 15 MINS: Performed by: OPHTHALMOLOGY

## 2017-07-24 PROCEDURE — V2787 ASTIGMATISM-CORRECT FUNCTION: HCPCS | Performed by: OPHTHALMOLOGY

## 2017-07-24 PROCEDURE — 25000125 ZZHC RX 250: Performed by: NURSE ANESTHETIST, CERTIFIED REGISTERED

## 2017-07-24 PROCEDURE — 25000125 ZZHC RX 250: Performed by: OPHTHALMOLOGY

## 2017-07-24 PROCEDURE — 25000128 H RX IP 250 OP 636: Performed by: ANESTHESIOLOGY

## 2017-07-24 DEVICE — IMPLANTABLE DEVICE: Type: IMPLANTABLE DEVICE | Site: EYE | Status: FUNCTIONAL

## 2017-07-24 RX ORDER — LIDOCAINE HYDROCHLORIDE 10 MG/ML
INJECTION, SOLUTION EPIDURAL; INFILTRATION; INTRACAUDAL; PERINEURAL PRN
Status: DISCONTINUED | OUTPATIENT
Start: 2017-07-24 | End: 2017-07-24 | Stop reason: HOSPADM

## 2017-07-24 RX ORDER — LIDOCAINE HYDROCHLORIDE 20 MG/ML
INJECTION, SOLUTION INFILTRATION; PERINEURAL PRN
Status: DISCONTINUED | OUTPATIENT
Start: 2017-07-24 | End: 2017-07-24

## 2017-07-24 RX ORDER — BALANCED SALT SOLUTION 6.4; .75; .48; .3; 3.9; 1.7 MG/ML; MG/ML; MG/ML; MG/ML; MG/ML; MG/ML
SOLUTION OPHTHALMIC PRN
Status: DISCONTINUED | OUTPATIENT
Start: 2017-07-24 | End: 2017-07-24 | Stop reason: HOSPADM

## 2017-07-24 RX ORDER — MOXIFLOXACIN 5 MG/ML
1 SOLUTION/ DROPS OPHTHALMIC
Status: COMPLETED | OUTPATIENT
Start: 2017-07-24 | End: 2017-07-24

## 2017-07-24 RX ORDER — PROPARACAINE HYDROCHLORIDE 5 MG/ML
1 SOLUTION/ DROPS OPHTHALMIC ONCE
Status: COMPLETED | OUTPATIENT
Start: 2017-07-24 | End: 2017-07-24

## 2017-07-24 RX ORDER — TROPICAMIDE 10 MG/ML
1 SOLUTION/ DROPS OPHTHALMIC
Status: COMPLETED | OUTPATIENT
Start: 2017-07-24 | End: 2017-07-24

## 2017-07-24 RX ORDER — PHENYLEPHRINE HYDROCHLORIDE 25 MG/ML
1 SOLUTION/ DROPS OPHTHALMIC
Status: COMPLETED | OUTPATIENT
Start: 2017-07-24 | End: 2017-07-24

## 2017-07-24 RX ORDER — DICLOFENAC SODIUM 1 MG/ML
1 SOLUTION/ DROPS OPHTHALMIC
Status: COMPLETED | OUTPATIENT
Start: 2017-07-24 | End: 2017-07-24

## 2017-07-24 RX ORDER — SODIUM CHLORIDE, SODIUM LACTATE, POTASSIUM CHLORIDE, CALCIUM CHLORIDE 600; 310; 30; 20 MG/100ML; MG/100ML; MG/100ML; MG/100ML
500 INJECTION, SOLUTION INTRAVENOUS CONTINUOUS
Status: DISCONTINUED | OUTPATIENT
Start: 2017-07-24 | End: 2017-07-24 | Stop reason: HOSPADM

## 2017-07-24 RX ADMIN — PHENYLEPHRINE HYDROCHLORIDE 1 DROP: 2.5 SOLUTION/ DROPS OPHTHALMIC at 11:25

## 2017-07-24 RX ADMIN — MOXIFLOXACIN HYDROCHLORIDE 1 DROP: 5 SOLUTION/ DROPS OPHTHALMIC at 11:18

## 2017-07-24 RX ADMIN — PROPARACAINE HYDROCHLORIDE 1 DROP: 5 SOLUTION/ DROPS OPHTHALMIC at 11:11

## 2017-07-24 RX ADMIN — DEXMEDETOMIDINE HYDROCHLORIDE 8 MCG: 100 INJECTION, SOLUTION INTRAVENOUS at 13:07

## 2017-07-24 RX ADMIN — Medication 1 DROP: at 11:27

## 2017-07-24 RX ADMIN — DICLOFENAC SODIUM 1 DROP: 1 SOLUTION/ DROPS OPHTHALMIC at 11:11

## 2017-07-24 RX ADMIN — LIDOCAINE HYDROCHLORIDE 1 ML: 10 INJECTION, SOLUTION EPIDURAL; INFILTRATION; INTRACAUDAL; PERINEURAL at 11:26

## 2017-07-24 RX ADMIN — TROPICAMIDE 1 DROP: 10 SOLUTION/ DROPS OPHTHALMIC at 11:25

## 2017-07-24 RX ADMIN — DICLOFENAC SODIUM 1 DROP: 1 SOLUTION/ DROPS OPHTHALMIC at 11:18

## 2017-07-24 RX ADMIN — DICLOFENAC SODIUM 1 DROP: 1 SOLUTION/ DROPS OPHTHALMIC at 11:25

## 2017-07-24 RX ADMIN — MOXIFLOXACIN HYDROCHLORIDE 1 DROP: 5 SOLUTION/ DROPS OPHTHALMIC at 11:25

## 2017-07-24 RX ADMIN — PHENYLEPHRINE HYDROCHLORIDE 1 DROP: 2.5 SOLUTION/ DROPS OPHTHALMIC at 11:11

## 2017-07-24 RX ADMIN — PHENYLEPHRINE HYDROCHLORIDE 1 DROP: 2.5 SOLUTION/ DROPS OPHTHALMIC at 11:18

## 2017-07-24 RX ADMIN — TROPICAMIDE 1 DROP: 10 SOLUTION/ DROPS OPHTHALMIC at 11:11

## 2017-07-24 RX ADMIN — DEXMEDETOMIDINE HYDROCHLORIDE 4 MCG: 100 INJECTION, SOLUTION INTRAVENOUS at 13:19

## 2017-07-24 RX ADMIN — TROPICAMIDE 1 DROP: 10 SOLUTION/ DROPS OPHTHALMIC at 11:18

## 2017-07-24 RX ADMIN — MOXIFLOXACIN HYDROCHLORIDE 1 DROP: 5 SOLUTION/ DROPS OPHTHALMIC at 11:11

## 2017-07-24 RX ADMIN — SODIUM CHLORIDE, POTASSIUM CHLORIDE, SODIUM LACTATE AND CALCIUM CHLORIDE 500 ML: 600; 310; 30; 20 INJECTION, SOLUTION INTRAVENOUS at 11:28

## 2017-07-24 RX ADMIN — LIDOCAINE HYDROCHLORIDE 40 MG: 20 INJECTION, SOLUTION INFILTRATION; PERINEURAL at 13:09

## 2017-07-24 ASSESSMENT — ENCOUNTER SYMPTOMS
SEIZURES: 1
DYSRHYTHMIAS: 1

## 2017-07-24 ASSESSMENT — LIFESTYLE VARIABLES: TOBACCO_USE: 1

## 2017-07-24 NOTE — IP AVS SNAPSHOT
MRN:[id number]                      After Visit Summary   7/24/2017    Ernie Leary    MRN: [id number]           Thank you!     Thank you for choosing [facility] for your care. Our goal is always to provide you with excellent care. Hearing back from our patients is one way we can continue to improve our services. Please take a few minutes to complete the written survey that you may receive in the mail after you visit with us. Thank you!        Patient Information     Date Of Birth          9/23/1924        About your hospital stay     You were admitted on:  July 24, 2017 You last received care in the:  [facility]    You were discharged on:  July 24, 2017       Who to Call     For medical emergencies, please call 911.  For non-urgent questions about your medical care, please call your primary care provider or clinic, [phone]  For questions related to your surgery, please call your surgery clinic        Attending Provider     Provider Specialty    Yoel Calero MD Ophthalmology       Primary Care Provider Office Phone # Fax #    Forest Ashraf [phone]      Your next 10 appointments already scheduled     Aug 21, 2017   Procedure with Yoel Calero MD   [facility] (--)    [address]  [facility] [id number] [phone]            Sep 06, 2017  1:30 PM CDT   Office Visit with Forest Ashraf MD   [facility]    [address] [facility] [id number] [phone]           Bring a current list of meds and any records pertaining to this visit.  For Physicals, please bring immunization records and any forms needing to be filled out.  Please arrive 10 minutes early to complete paperwork.              Further instructions from your care team       [facility] Discharge  Instructions  Anesthesia (Eye Care Center)   Adult Discharge  Instructions    For 24 hours after surgery    1. Get plenty of rest.  Make arrangements to have a responsible adult stay with you for at least 6 hours after you leave the hospital.  2. Do not drive or use heavy equipment for 24 hours.    3. Do not drink alcohol for 24 hours.  4. Do not sign legal documents or make important decisions for 24 hours.  5. Avoid strenuous or risky activities. You may feel lightheaded.  If so, sit for a few minutes before standing.  Have someone help you get up.   6. Conscious sedation patients may resume a regular diet..  7. Any questions of medical nature, call your physician.  Dr. Yoel Calero  [facility]  [phone]  Post Operative Cataract Instructions        You may remove the eye shield on arrival home and begin eye drops as directed when you get home.      Wear eye shield when sleeping for protection for 5 days.      Do not rub the operated eye.      Light sensitivity may be noticed. Sunglasses may be worn for comfort.      Some discomfort and irritation may be noticed. Acetaminophen (Tylenol) or Ibuprofen (Advil) may be taken for discomfort. If pain persists please call Dr. Calero's office.      Keep the operated eye dry. You may wash your hair, bathe or shower, but keep the operated eye closed while doing so.       Use medication exactly as prescribed by your doctor. You may restart your regular home medications.      Bring any glaucoma medications with you to the clinic on your first post operative visit.      Call Dr. Calero's office if any of the following should occur:    o Any sudden vision changes  o Nausea or severe headache  o Increase in pain not controlled  o Or signs of infection (pus, increasing redness or tenderness)      Pending Results     No orders found from 7/22/2017 to 7/25/2017.            Admission Information     Date & Time Provider Department Dept. Phone    7/24/2017 Yoel Calero MD [facility] [phone]      Your  Vitals Were     Blood Pressure Temperature Respirations Weight Pulse Oximetry BMI (Body Mass Index)    122/77 97.6  F (36.4  C) (Temporal) 18 77.1 kg (170 lb) 98% 26.63 kg/m2      [employer] Information     [employer] gives you secure access to your electronic health record. If you see a primary care provider, you can also send messages to your care team and make appointments. If you have questions, please call your primary care clinic.  If you do not have a primary care provider, please call [phone] and they will assist you.        Care EveryWhere ID     This is your Care EveryWhere ID. This could be used by other organizations to access your [facility] medical records  [id number]        Equal Access to Services     CRISTA VAIL : Yissel Lovell, debbie mack, meghan moe, janice dupree. So [facility] [phone].    ATENCIÓN: Si habla español, tiene a crane disposición servicios gratuitos de asistencia lingüística. Llame al [phone].    We comply with applicable federal civil rights laws and [place] laws. We do not discriminate on the basis of race, color, national origin, age, disability sex, sexual orientation or gender identity.               Review of your medicines      UNREVIEWED medicines. Ask your doctor about these medicines        Dose / Directions    amLODIPine 2.5 MG tablet   Commonly known as:  NORVASC   Used for:  Essential hypertension, benign        Dose:  2.5 mg   Take 1 tablet (2.5 mg) by mouth 2 times daily   Quantity:  180 tablet   Refills:  3       calcium 600 MG tablet        Dose:  1 tablet   Take 1 tablet by mouth daily   Quantity:  180 tablet   Refills:  3       calcium carbonate 500 MG chewable tablet   Commonly known as:  TUMS        Dose:  1 chew tab   Take 1 chew tab by mouth daily as needed.   Refills:  0       COENZYME Q-10 PO        Dose:  100 mg   Take 100 mg by mouth daily   Refills:  0       CYANOCOBALAMIN PO        Dose:   1000 mcg   Take 1,000 mcg by mouth daily.   Refills:  0       fluticasone 50 MCG/ACT spray   Commonly known as:  FLONASE   Used for:  Chronic rhinitis        Dose:  2 spray   Spray 2 sprays into both nostrils daily   Quantity:  1 Bottle   Refills:  1       fosinopril 40 MG tablet   Commonly known as:  MONOPRIL   Used for:  Essential hypertension, benign        take 1/2 tablet by mouth in the morning and 1 tab in the evening   Quantity:  135 tablet   Refills:  3       levETIRAcetam 500 MG tablet   Commonly known as:  KEPPRA        Dose:  500 mg   Take 1 tablet (500 mg) by mouth 2 times daily   Quantity:  60 tablet   Refills:  0       magnesium hydroxide 400 MG/5ML suspension   Commonly known as:  MILK OF MAGNESIA        Dose:  30 mL   Take 30 mLs by mouth daily as needed. Constipation.   Refills:  0       metoprolol 50 MG tablet   Commonly known as:  LOPRESSOR   Used for:  Essential hypertension, benign        Take one-half tablet by mouth twice daily   Quantity:  90 tablet   Refills:  3       omega-3 acid ethyl esters 1 G capsule   Commonly known as:  Lovaza        Dose:  1 g   Take 1 g by mouth daily   Refills:  0       prune juice Liqd        Take  by mouth daily (with breakfast).   Refills:  0       psyllium 58.6 % Powd   Commonly known as:  METAMUCIL        Dose:  1 teaspoonful   Take 1 teaspoonful by mouth 3 times daily as needed. Constipation.   Refills:  0       vitamin B complex with vitamin C Tabs tablet        Dose:  1 tablet   Take 1 tablet by mouth daily   Refills:  0       vitamin D 2000 UNITS tablet   Used for:  Recurrent falls        Dose:  2000 Units   Take 2,000 Units by mouth daily   Quantity:  100 tablet   Refills:  3                Protect others around you: Learn how to safely use, store and throw away your medicines at www.disposemymeds.org.             Medication List: This is a list of all your medications and when to take them. Check marks below indicate your daily home schedule. Keep this  list as a reference.      Medications           Morning Afternoon Evening Bedtime As Needed    amLODIPine 2.5 MG tablet   Commonly known as:  NORVASC   Take 1 tablet (2.5 mg) by mouth 2 times daily                                calcium 600 MG tablet   Take 1 tablet by mouth daily                                calcium carbonate 500 MG chewable tablet   Commonly known as:  TUMS   Take 1 chew tab by mouth daily as needed.                                COENZYME Q-10 PO   Take 100 mg by mouth daily                                CYANOCOBALAMIN PO   Take 1,000 mcg by mouth daily.                                fluticasone 50 MCG/ACT spray   Commonly known as:  FLONASE   Spray 2 sprays into both nostrils daily                                fosinopril 40 MG tablet   Commonly known as:  MONOPRIL   take 1/2 tablet by mouth in the morning and 1 tab in the evening                                levETIRAcetam 500 MG tablet   Commonly known as:  KEPPRA   Take 1 tablet (500 mg) by mouth 2 times daily                                magnesium hydroxide 400 MG/5ML suspension   Commonly known as:  MILK OF MAGNESIA   Take 30 mLs by mouth daily as needed. Constipation.                                metoprolol 50 MG tablet   Commonly known as:  LOPRESSOR   Take one-half tablet by mouth twice daily                                omega-3 acid ethyl esters 1 G capsule   Commonly known as:  Lovaza   Take 1 g by mouth daily                                prune juice Liqd   Take  by mouth daily (with breakfast).                                psyllium 58.6 % Powd   Commonly known as:  METAMUCIL   Take 1 teaspoonful by mouth 3 times daily as needed. Constipation.                                vitamin B complex with vitamin C Tabs tablet   Take 1 tablet by mouth daily                                vitamin D 2000 UNITS tablet   Take 2,000 Units by mouth daily

## 2017-07-24 NOTE — DISCHARGE INSTRUCTIONS
[facility] Discharge  Instructions  Anesthesia (Eye Care Center)   Adult Discharge Instructions    For 24 hours after surgery    1. Get plenty of rest.  Make arrangements to have a responsible adult stay with you for at least 6 hours after you leave the hospital.  2. Do not drive or use heavy equipment for 24 hours.    3. Do not drink alcohol for 24 hours.  4. Do not sign legal documents or make important decisions for 24 hours.  5. Avoid strenuous or risky activities. You may feel lightheaded.  If so, sit for a few minutes before standing.  Have someone help you get up.   6. Conscious sedation patients may resume a regular diet..  7. Any questions of medical nature, call your physician.  Dr. Yoel Calero  [facility]  [phone]  Post Operative Cataract Instructions        You may remove the eye shield on arrival home and begin eye drops as directed when you get home.      Wear eye shield when sleeping for protection for 5 days.      Do not rub the operated eye.      Light sensitivity may be noticed. Sunglasses may be worn for comfort.      Some discomfort and irritation may be noticed. Acetaminophen (Tylenol) or Ibuprofen (Advil) may be taken for discomfort. If pain persists please call Dr. Calero's office.      Keep the operated eye dry. You may wash your hair, bathe or shower, but keep the operated eye closed while doing so.       Use medication exactly as prescribed by your doctor. You may restart your regular home medications.      Bring any glaucoma medications with you to the clinic on your first post operative visit.      Call Dr. Calero's office if any of the following should occur:    o Any sudden vision changes  o Nausea or severe headache  o Increase in pain not controlled  o Or signs of infection (pus, increasing redness or tenderness)

## 2017-07-24 NOTE — H&P (VIEW-ONLY)
[address] [address]  [phone]  Dept: [phone]    PRE-OP EVALUATION:  Today's date: 2017    Ernie Leary (: 1924) presents for pre-operative evaluation assessment as requested by Dr. Calero, Yoel MATTHEW MD.  He requires evaluation and anesthesia risk assessment prior to undergoing surgery/procedure for treatment of bilateral cataracts .  Proposed procedure: PHACOEMULSIFICATION Clear Cornea with Toric Intraocular lens implant Right and Left    Date of Surgery/ Procedure: Right on 2017 @ 12:30pm and Left on 2017 @ 10:00am  Time of Surgery/ Procedure:   Hospital/Surgical Facility: [facility]    Primary Physician: Forest Ashraf  Type of Anesthesia Anticipated: Local with MAC    Patient has a Health Care Directive or Living Will:  YES     1. Yes - Do you have a history of heart attack, stroke, stent, bypass or surgery on an artery in the head, neck, heart or legs?  Atrial fib/flutter ablation  2. NO - Do you ever have any pain or discomfort in your chest?  3. NO - Do you have a history of  Heart Failure?  4. NO - Are you troubled by shortness of breath when: walking on the level, up a slight hill or at night?  5. NO - Do you currently have a cold, bronchitis or other respiratory infection?  6. NO - Do you have a cough, shortness of breath or wheezing?  7. NO - Do you sometimes get pains in the calves of your legs when you walk?  8. NO - Do you or anyone in your family have previous history of blood clots?  9. NO - Do you or does anyone in your family have a serious bleeding problem such as prolonged bleeding following surgeries or cuts?  10. Yes - Have you ever had problems with anemia or been told to take iron pills?  Last hemoglobin was 12.2 on   11. NO - Have you had any abnormal blood loss such as black, tarry or bloody stools, or abnormal vaginal bleeding?  12. NO - Have you ever had a blood transfusion?  13. NO - Have you or any of your  relatives ever had problems with anesthesia?  14. NO - Do you have sleep apnea, excessive snoring or daytime drowsiness?  15. NO - Do you have any prosthetic heart valves?  16. NO - Do you have prosthetic joints?  17. NO - Is there any chance that you may be pregnant?        HPI:                                                      Brief HPI related to upcoming procedure: Progressive vision loss in both eyes that is now severe.  Found to have bilateral cataracts.     Recently discharged after seizure.  Placed back on anticonvulsant.  No seizure activity since hospital discharge.     He can walk up a flight stairs without chest pain or dyspnea.        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 Provider to review and confirm.       HTN (hypertension) 05/24/2013     Priority: Medium     Microalbuminuria 02/15/2013     Priority: Medium     IFG (impaired fasting glucose) 12/23/2012     Priority: Medium     Anemia 08/23/2012     Priority: Medium     BPH (benign prostatic hyperplasia) 08/15/2012     Priority: Medium     BPH loc w urin obs/LUTS 08/14/2012     Priority: Medium     Elevated glucose 07/04/2012     Priority: Medium     History of atrial flutter 12/10/2011     Priority: Medium     S/p ablation       Advanced directives, counseling/discussion 09/19/2011     Priority: Medium      Advance Directive Follow-up Visit    Ernie Leary presents for advanced care planning session accompanied by designated health care agent.  Reviewed definitions of advanced care planning and advance directive form, and informational handouts given to patient previously.  Patient voices understanding of advance care planning and advance directive form    Designated healthcare agent is identified. Healthcare agent s name is see below.  Designated healthcare agent concerns:  none.  My Hopes and Wishes reviewed and patient and designated healthcare agent are in agreement.  Finalized wishes are clear to both patient and designated healthcare agent: Yes  Patient and designated healthcare agent are aware that document may be changed at any time in the future.    Advanced directive form: completed at this visit.  Advanced directive form: verified with notary signature..  Original and two copies of advanced directive form given to patient copy sent to  for scanning into EMR and original given to patient and instructed to give copy to designated [facility] and non-[facility] physician where applicable    2nd visit, directive completed, notarized and documented.    Advance Directive Problem List Overview:   Name Relationship Phone    Primary Health Care Agent Alexsandra Carey Spouse  [phone]         Alternative Health Care Agent Nikhil Linton grandson [phone]  [phone]        11/14/2011 JORDI Graham LPN      Advance Directive Initial Visit  Ernie Leary presents in person for initial session regarding completion of advanced directive form. He was referred to the facilitator by provider.  He currently has no advance directive.  Plan:  Advanced directive form, healthcare agent information card, advanced care planning information booklet provided to patient.   Follow up meeting: pt will call to come back. Patient instructed to bring healthcare agent to this meeting.   9/19/11 JORDI Graham  LPN    Advance Care Planning:   Receipt of ACP document:  Received: POLST which was signed and dated by provider on 3/28/13.  Document not previously scanned.  Reviewed for validity as medical order and sent to be scanned.  Code Status reflects choices in most recent ACP document.  Confirmed/documented designated decision maker(s). See permanent comments section of demographics in clinical tab. View document(s) and details by clicking on code status.   POLST-DNR/DNI form reviewed, signed and documented, see form for specifics.  Added by Aleja Graham on 3/29/2013.             Hyperlipidemia LDL goal <130 02/07/2011     Priority: Medium     Normocytic anemia 03/02/2010     Priority: Medium     Diagnosis updated by automated process. Provider to review and confirm.       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     Priority: Medium     Essential hypertension, benign 06/02/2003     Priority: Medium      Past Medical History:   Diagnosis Date     Abnormal glucose      Atrial flutter (H)     ablation 2008     Diabetes mellitus (H)     not currently being treated, diet controlled at this time     Essential hypertension, benign      Hemorrhagic stroke (H) 2012     Other and unspecified hyperlipidemia      Sebaceous cyst      Seizure disorder (H) 7/21/2017     Stroke (H)      Syncope and collapse 11/15/01     Past Surgical History:   Procedure Laterality Date     ------------OTHER-------------  1/1/2008    Cardiac ablation     ADJACENT TISSUE TRANSFER, FOREHEAD/CHEEKS/CHIN/MOUTH/NECK/AXILLAE/GENITALIA/HANDS/FEET, < OR = 10CM       ARTHROTOMY SHOULDER, ROTATOR CUFF REPAIR, COMBINED  11/18/2011    Procedure:COMBINED ARTHROTOMY SHOULDER, ROTATOR CUFF REPAIR; LEFT SHOULDER OPEN ROTATOR CUFF REPAIR, BURSECTOMY, DISTAL CLAVICLE RESECTION; Surgeon:KHALIDA ZABALA; Location:[facility] OR     BIOPSY      multiple skin  biopsies for skin cancers     CARDIAC SURGERY       CYSTOSCOPY, TRANSURETHRAL RESECTION (TUR) PROSTATE, COMBINED  8/15/2012    Procedure: COMBINED CYSTOSCOPY, TRANSURETHRAL RESECTION (TUR) PROSTATE;  CYSTOSCOPY, TRANSURETHRAL RESECTION  OF  PROSTATE WITH PLASMA LOOP;  Surgeon: Harmeet Kilgore MD;  Location:  OR     HERNIORRHAPHY INGUINAL Right 5/29/2015    Procedure: HERNIORRHAPHY INGUINAL;  Surgeon: Scottie Nunn MD;  Location: [facility]     Current Outpatient Prescriptions   Medication Sig Dispense Refill     amLODIPine (NORVASC) 2.5 MG tablet Take 1 tablet (2.5 mg) by mouth 2 times daily 180 tablet 3     metoprolol (LOPRESSOR) 50 MG tablet Take one-half tablet by mouth twice daily 90 tablet 3     fosinopril (MONOPRIL) 40 MG tablet take 1/2 tablet by mouth in the morning and 1 tab in the evening 135 tablet 3     levETIRAcetam (KEPPRA) 500 MG tablet Take 1 tablet (500 mg) by mouth 2 times daily 60 tablet 0     COENZYME Q-10 PO Take 100 mg by mouth daily       omega-3 acid ethyl esters (LOVAZA) 1 G capsule Take 1 g by mouth daily       vitamin B complex with vitamin C (VITAMIN  B COMPLEX) TABS tablet Take 1 tablet by mouth daily       Cholecalciferol (VITAMIN D) 2000 UNITS tablet Take 2,000 Units by mouth daily 100 tablet 3     fluticasone (FLONASE) 50 MCG/ACT spray Spray 2 sprays into both nostrils daily 1 Bottle 1     CYANOCOBALAMIN PO Take 1,000 mcg by mouth daily.       psyllium (METAMUCIL) 58.6 % POWD Take 1 teaspoonful by mouth 3 times daily as needed. Constipation.       magnesium hydroxide (MILK OF MAGNESIA) 400 MG/5ML suspension Take 30 mLs by mouth daily as needed. Constipation.       calcium carbonate (TUMS) 500 MG chewable tablet Take 1 chew tab by mouth daily as needed.       prune juice LIQD Take  by mouth daily (with breakfast).       calcium 600 MG tablet Take 1 tablet by mouth daily  180 tablet 3     [DISCONTINUED] amLODIPine (NORVASC) 2.5 MG tablet Take 1 tablet (2.5 mg) by mouth 2 times daily 180  "tablet 3     [DISCONTINUED] fosinopril (MONOPRIL) 40 MG tablet take 1/2 tablet by mouth in the morning and 1 tab in the evening 135 tablet 3     [DISCONTINUED] metoprolol (LOPRESSOR) 50 MG tablet Take one-half tablet by mouth twice daily 90 tablet 3     OTC products: None, except as noted above    Allergies   Allergen Reactions     Adrenalin [Epinephrine Hcl]      Hctz      hyponatremia      Latex Allergy: NO    Social History   Substance Use Topics     Smoking status: Former Smoker     Packs/day: 2.00     Years: 30.00     Types: Cigarettes     Quit date: 1/1/1981     Smokeless tobacco: Never Used     Alcohol use No     History   Drug Use No     He used to work for the space [employer]    REVIEW OF SYSTEMS:                                                    Constitutional, neuro, ENT, endocrine, pulmonary, cardiac, gastrointestinal, genitourinary, musculoskeletal, integument and psychiatric systems are negative, except as otherwise noted.      EXAM:                                                    /76  Pulse 66  Temp 97.7  F (36.5  C) (Oral)  Ht 5' 7\" (1.702 m)  Wt 170 lb 12.8 oz (77.5 kg)  SpO2 97%  BMI 26.75 kg/m2    GENERAL APPEARANCE: healthy, alert and no distress     EYES: EOMI,  PERRL     HENT: ear canals and TM's normal and nose and mouth without ulcers or lesions     NECK: no adenopathy, no asymmetry, masses, or scars and thyroid normal to palpation     RESP: lungs clear to auscultation - no rales, rhonchi or wheezes     CV: regular rate and rhythm, no murmur     ABDOMEN:  soft, nontender, no HSM or masses and bowel sounds normal     MS: extremities normal- no gross deformities noted, no evidence of inflammation in joints, FROM in all extremities.     SKIN: no suspicious lesions or rashes     NEURO: Alert and oriented to person, place and time. Cranial nerves 2-12 appear grossly intact.   Left sided weakness and neglect noted.  He is not using a cane or walker, but I did recommend that he use a " "cane; he did fail the \"get up and go\" test      PSYCH: mentation appears normal. and affect normal/bright     LYMPHATICS: No axillary, cervical, or supraclavicular nodes    DIAGNOSTICS:                                                    No labs or EKG required for low risk surgery (cataract, skin procedure, breast biopsy, etc)    Recent Labs   Lab Test  06/27/17   0744  06/26/17   0904   07/12/13   1150   03/20/13   1008   11/03/12   2115   HGB  12.2*  12.7*   < >  12.2*   < >  12.5*   < >  12.6*   PLT  192  207   < >  258   < >  234   < >  246   INR   --    --    --   1.01   --    --    --   1.04   NA  138  135   < >  135   < >   --    < >  135   POTASSIUM  4.4  3.7   < >  4.1   < >   --    < >  3.9   CR  0.98  0.95   < >  0.98   < >   --    < >  0.89   A1C   --   6.5*   --    --    --   6.3*   < >   --     < > = values in this interval not displayed.        IMPRESSION:                                                    Reason for surgery/procedure: Cataracts  Diagnosis/reason for consult: Preoperative evaluation     The proposed surgical procedure is considered LOW risk.    REVISED CARDIAC RISK INDEX  The patient has the following serious cardiovascular risks for perioperative complications such as (MI, PE, VFib and 3  AV Block):  No serious cardiac risks  INTERPRETATION: 0 risks: Class I (very low risk - 0.4% complication rate)    The patient has the following additional risks for perioperative complications:    Advanced age  History of hemorrhagic stroke  Seizure disorder       ICD-10-CM    1. Preop general physical exam Z01.818    2. Atrial flutter, unspecified type (H) I48.92    3. Cerebral amyloid angiopathy (H) E85.4     I68.0    4. Seizure disorder (H) G40.909    5. Essential hypertension, benign I10 amLODIPine (NORVASC) 2.5 MG tablet     metoprolol (LOPRESSOR) 50 MG tablet     fosinopril (MONOPRIL) 40 MG tablet   6. History of intracranial hemorrhage Z86.79      With CAA and left hemineglect and weakness, " he should be using a cane or walker and discussed this with he and his wife    RECOMMENDATIONS:                                                        --Patient is to take all scheduled medications on the day of surgery    APPROVAL GIVEN to proceed with proposed procedure, without further diagnostic evaluation       Signed Electronically by: Forest Ashraf MD    Copy of this evaluation report is provided to requesting physician.    [facility] Preop Guidelines

## 2017-07-24 NOTE — ANESTHESIA POSTPROCEDURE EVALUATION
Patient: Ernie Leary    Procedure(s):  RIGHT PHACOEMULSIFICATION CLEAR CORNEA WITH TORIC INTRAOCULAR LENS IMPLANT  - Wound Class: I-Clean    Diagnosis:RIGHT EYE CATARACT   Diagnosis Additional Information: No value filed.    Anesthesia Type:  MAC    Note:  Anesthesia Post Evaluation    Patient location during evaluation: PACU  Patient participation: Able to fully participate in evaluation  Level of consciousness: awake  Pain management: adequate  Airway patency: patent  Cardiovascular status: acceptable  Respiratory status: acceptable  Hydration status: acceptable  PONV: none     Anesthetic complications: None          Last vitals:  Vitals:    07/24/17 1115 07/24/17 1331 07/24/17 1354   BP: (!) 187/96 122/77 105/79   Resp: 16 18 18   Temp: 36.4  C (97.6  F)     SpO2: 98%           Electronically Signed By: Anisa Christie MD  July 24, 2017  2:24 PM

## 2017-07-24 NOTE — IP AVS SNAPSHOT
[facility]    [address]    [place] [address]    Phone:  [phone]    Fax:  [phone]                                       After Visit Summary   7/24/2017    Ernie Leary    MRN: [id number]           After Visit Summary Signature Page     I have received my discharge instructions, and my questions have been answered. I have discussed any challenges I see with this plan with the nurse or doctor.    ..........................................................................................................................................  Patient/Patient Representative Signature      ..........................................................................................................................................  Patient Representative Print Name and Relationship to Patient    ..................................................               ................................................  Date                                            Time    ..........................................................................................................................................  Reviewed by Signature/Title    ...................................................              ..............................................  Date                                                            Time

## 2017-07-24 NOTE — ANESTHESIA CARE TRANSFER NOTE
Patient: Ernie Leary    Procedure(s):  RIGHT PHACOEMULSIFICATION CLEAR CORNEA WITH TORIC INTRAOCULAR LENS IMPLANT  - Wound Class: I-Clean    Diagnosis: RIGHT EYE CATARACT   Diagnosis Additional Information: No value filed.    Anesthesia Type:   MAC     Note:  Airway :Room Air  Patient transferred to:PACU  Comments: Pt to PACU on room air, airway patent, VSS.  Report to RN.      Vitals: (Last set prior to Anesthesia Care Transfer)    CRNA VITALS  7/24/2017 1257 - 7/24/2017 1330      7/24/2017             Ht Rate: 51    Resp Rate (observed): (!)  2    Resp Rate (set): 10                Electronically Signed By: KI Sanchez CRNA  July 24, 2017  1:30 PM

## 2017-07-24 NOTE — ANESTHESIA PREPROCEDURE EVALUATION
Anesthesia Evaluation     . Pt has had prior anesthetic. Type: General    No history of anesthetic complications          ROS/MED HX    ENT/Pulmonary:     (+)tobacco use, Past use , . .   (-) sleep apnea and recent URI   Neurologic: Comment: Amyloid angiopathy (cerebral)    (+)seizures CVA TIA     Cardiovascular: Comment: S/p ablation    (+) Dyslipidemia, hypertension----. : . . fainting (syncope). :. dysrhythmias a-fib and a-flutter, .       METS/Exercise Tolerance:  >4 METS   Hematologic:         Musculoskeletal:         GI/Hepatic:  - neg GI/hepatic ROS       Renal/Genitourinary:         Endo:         Psychiatric:         Infectious Disease:         Malignancy:         Other:                     Physical Exam      Airway   Mallampati: II  TM distance: >3 FB  Neck ROM: full    Dental   (+) missing and caps    Cardiovascular   Rhythm and rate: regular and normal      Pulmonary    breath sounds clear to auscultation                    Anesthesia Plan      History & Physical Review  History and physical reviewed and following examination; no interval change.    ASA Status:  3 .        Plan for MAC     Slow titration    MAP  goal      Postoperative Care      Consents  Anesthetic plan, risks, benefits and alternatives discussed with:  Patient..                          .

## 2017-08-21 ENCOUNTER — ANESTHESIA EVENT (OUTPATIENT)
Dept: SURGERY | Facility: CLINIC | Age: 82
End: 2017-08-21
Payer: MEDICARE

## 2017-08-21 ENCOUNTER — APPOINTMENT (OUTPATIENT)
Dept: ADMISSION | Facility: CLINIC | Age: 82
End: 2017-08-21
Attending: OPHTHALMOLOGY
Payer: COMMERCIAL

## 2017-08-21 ENCOUNTER — ANESTHESIA (OUTPATIENT)
Dept: SURGERY | Facility: CLINIC | Age: 82
End: 2017-08-21
Payer: MEDICARE

## 2017-08-21 ENCOUNTER — HOSPITAL ENCOUNTER (OUTPATIENT)
Facility: CLINIC | Age: 82
Discharge: HOME | End: 2017-08-21
Attending: OPHTHALMOLOGY | Admitting: OPHTHALMOLOGY
Payer: MEDICARE

## 2017-08-21 VITALS
RESPIRATION RATE: 16 BRPM | TEMPERATURE: 97 F | SYSTOLIC BLOOD PRESSURE: 130 MMHG | HEIGHT: 67 IN | OXYGEN SATURATION: 96 % | DIASTOLIC BLOOD PRESSURE: 64 MMHG

## 2017-08-21 DIAGNOSIS — H25.012 CORTICAL AGE-RELATED CATARACT OF LEFT EYE: Primary | ICD-10-CM

## 2017-08-21 PROCEDURE — 36000101 ZZH EYE SURGERY LEVEL 3 1ST 30 MIN: Performed by: OPHTHALMOLOGY

## 2017-08-21 PROCEDURE — 71000028 ZZH EYE RECOVERY PHASE 2 EACH 15 MINS: Performed by: OPHTHALMOLOGY

## 2017-08-21 PROCEDURE — 40000170 ZZH STATISTIC PRE-PROCEDURE ASSESSMENT II: Performed by: OPHTHALMOLOGY

## 2017-08-21 PROCEDURE — 37000008 ZZH ANESTHESIA TECHNICAL FEE, 1ST 30 MIN: Performed by: OPHTHALMOLOGY

## 2017-08-21 PROCEDURE — 25000125 ZZHC RX 250: Performed by: OPHTHALMOLOGY

## 2017-08-21 PROCEDURE — V2632 POST CHMBR INTRAOCULAR LENS: HCPCS | Performed by: OPHTHALMOLOGY

## 2017-08-21 PROCEDURE — 27210794 ZZH OR GENERAL SUPPLY STERILE: Performed by: OPHTHALMOLOGY

## 2017-08-21 PROCEDURE — 25000125 ZZHC RX 250: Performed by: ANESTHESIOLOGY

## 2017-08-21 PROCEDURE — 25000128 H RX IP 250 OP 636: Performed by: NURSE ANESTHETIST, CERTIFIED REGISTERED

## 2017-08-21 PROCEDURE — 25000125 ZZHC RX 250: Performed by: NURSE ANESTHETIST, CERTIFIED REGISTERED

## 2017-08-21 PROCEDURE — 25000128 H RX IP 250 OP 636: Performed by: OPHTHALMOLOGY

## 2017-08-21 PROCEDURE — 25000128 H RX IP 250 OP 636: Performed by: ANESTHESIOLOGY

## 2017-08-21 PROCEDURE — V2787 ASTIGMATISM-CORRECT FUNCTION: HCPCS | Performed by: OPHTHALMOLOGY

## 2017-08-21 DEVICE — IMPLANTABLE DEVICE: Type: IMPLANTABLE DEVICE | Site: EYE | Status: FUNCTIONAL

## 2017-08-21 RX ORDER — PHENYLEPHRINE HYDROCHLORIDE 25 MG/ML
1 SOLUTION/ DROPS OPHTHALMIC
Status: COMPLETED | OUTPATIENT
Start: 2017-08-21 | End: 2017-08-21

## 2017-08-21 RX ORDER — SODIUM CHLORIDE, SODIUM LACTATE, POTASSIUM CHLORIDE, CALCIUM CHLORIDE 600; 310; 30; 20 MG/100ML; MG/100ML; MG/100ML; MG/100ML
500 INJECTION, SOLUTION INTRAVENOUS CONTINUOUS
Status: DISCONTINUED | OUTPATIENT
Start: 2017-08-21 | End: 2017-08-21 | Stop reason: HOSPADM

## 2017-08-21 RX ORDER — MOXIFLOXACIN 5 MG/ML
1 SOLUTION/ DROPS OPHTHALMIC
Status: COMPLETED | OUTPATIENT
Start: 2017-08-21 | End: 2017-08-21

## 2017-08-21 RX ORDER — BALANCED SALT SOLUTION 6.4; .75; .48; .3; 3.9; 1.7 MG/ML; MG/ML; MG/ML; MG/ML; MG/ML; MG/ML
SOLUTION OPHTHALMIC PRN
Status: DISCONTINUED | OUTPATIENT
Start: 2017-08-21 | End: 2017-08-21 | Stop reason: HOSPADM

## 2017-08-21 RX ORDER — TROPICAMIDE 10 MG/ML
1 SOLUTION/ DROPS OPHTHALMIC
Status: COMPLETED | OUTPATIENT
Start: 2017-08-21 | End: 2017-08-21

## 2017-08-21 RX ORDER — LIDOCAINE HYDROCHLORIDE 10 MG/ML
INJECTION, SOLUTION EPIDURAL; INFILTRATION; INTRACAUDAL; PERINEURAL PRN
Status: DISCONTINUED | OUTPATIENT
Start: 2017-08-21 | End: 2017-08-21 | Stop reason: HOSPADM

## 2017-08-21 RX ORDER — DICLOFENAC SODIUM 1 MG/ML
1 SOLUTION/ DROPS OPHTHALMIC
Status: COMPLETED | OUTPATIENT
Start: 2017-08-21 | End: 2017-08-21

## 2017-08-21 RX ORDER — PROPARACAINE HYDROCHLORIDE 5 MG/ML
1 SOLUTION/ DROPS OPHTHALMIC ONCE
Status: COMPLETED | OUTPATIENT
Start: 2017-08-21 | End: 2017-08-21

## 2017-08-21 RX ADMIN — DICLOFENAC SODIUM 1 DROP: 1 SOLUTION/ DROPS OPHTHALMIC at 08:32

## 2017-08-21 RX ADMIN — PHENYLEPHRINE HYDROCHLORIDE 1 DROP: 2.5 SOLUTION/ DROPS OPHTHALMIC at 08:41

## 2017-08-21 RX ADMIN — SODIUM CHLORIDE, SODIUM LACTATE, POTASSIUM CHLORIDE, CALCIUM CHLORIDE: 600; 310; 30; 20 INJECTION, SOLUTION INTRAVENOUS at 09:20

## 2017-08-21 RX ADMIN — DEXMEDETOMIDINE HYDROCHLORIDE 8 MCG: 100 INJECTION, SOLUTION INTRAVENOUS at 09:53

## 2017-08-21 RX ADMIN — DICLOFENAC SODIUM 1 DROP: 1 SOLUTION/ DROPS OPHTHALMIC at 08:46

## 2017-08-21 RX ADMIN — TROPICAMIDE 1 DROP: 10 SOLUTION/ DROPS OPHTHALMIC at 08:46

## 2017-08-21 RX ADMIN — Medication 1 DROP: at 08:32

## 2017-08-21 RX ADMIN — DICLOFENAC SODIUM 1 DROP: 1 SOLUTION/ DROPS OPHTHALMIC at 08:41

## 2017-08-21 RX ADMIN — TROPICAMIDE 1 DROP: 10 SOLUTION/ DROPS OPHTHALMIC at 08:41

## 2017-08-21 RX ADMIN — MOXIFLOXACIN 1 DROP: 5 SOLUTION/ DROPS OPHTHALMIC at 08:32

## 2017-08-21 RX ADMIN — MOXIFLOXACIN 1 DROP: 5 SOLUTION/ DROPS OPHTHALMIC at 08:41

## 2017-08-21 RX ADMIN — PHENYLEPHRINE HYDROCHLORIDE 1 DROP: 2.5 SOLUTION/ DROPS OPHTHALMIC at 08:46

## 2017-08-21 RX ADMIN — MOXIFLOXACIN 1 DROP: 5 SOLUTION/ DROPS OPHTHALMIC at 08:46

## 2017-08-21 RX ADMIN — SODIUM CHLORIDE, SODIUM LACTATE, POTASSIUM CHLORIDE, CALCIUM CHLORIDE 500 ML: 600; 310; 30; 20 INJECTION, SOLUTION INTRAVENOUS at 09:02

## 2017-08-21 RX ADMIN — PROPARACAINE HYDROCHLORIDE 1 DROP: 5 SOLUTION/ DROPS OPHTHALMIC at 08:32

## 2017-08-21 RX ADMIN — TROPICAMIDE 1 DROP: 10 SOLUTION/ DROPS OPHTHALMIC at 08:32

## 2017-08-21 RX ADMIN — LIDOCAINE HYDROCHLORIDE 1 ML: 10 INJECTION, SOLUTION EPIDURAL; INFILTRATION; INTRACAUDAL; PERINEURAL at 09:02

## 2017-08-21 RX ADMIN — PHENYLEPHRINE HYDROCHLORIDE 1 DROP: 2.5 SOLUTION/ DROPS OPHTHALMIC at 08:32

## 2017-08-21 ASSESSMENT — ENCOUNTER SYMPTOMS
ORTHOPNEA: 0
SEIZURES: 1
DYSRHYTHMIAS: 1

## 2017-08-21 ASSESSMENT — LIFESTYLE VARIABLES: TOBACCO_USE: 1

## 2017-08-21 NOTE — ANESTHESIA CARE TRANSFER NOTE
Patient: Ernie Leary    Procedure(s):  LEFT PHACOEMULSIFICATION CLEAR CORNEA WITH TORIC INTRAOCULAR LENS IMPLANT  - Wound Class: I-Clean    Diagnosis: LEFT EYE CATARACT   Diagnosis Additional Information: No value filed.    Anesthesia Type:   MAC     Note:  Airway :Room Air  Patient transferred to:PACU  Comments: [facility] in recliner with armrests up, spontaneous respirations, O2 saturation maintained at upper 90s on room air. All monitors and alarms on and functioning, clinically stable vital signs. Report given to recovery RN and questions answered. Patient alert and following verbal directions.      Vitals: (Last set prior to Anesthesia Care Transfer)    CRNA VITALS  8/21/2017 0943 - 8/21/2017 1019      8/21/2017             Pulse: 61    Ht Rate: 61    SpO2: 97 %    Resp Rate (set): 10                Electronically Signed By: KI Devi CRNA  August 21, 2017  10:19 AM

## 2017-08-21 NOTE — ANESTHESIA PREPROCEDURE EVALUATION
Anesthesia Evaluation     . Pt has had prior anesthetic.            ROS/MED HX    ENT/Pulmonary:     (+)tobacco use, Past use , . .   (-) sleep apnea   Neurologic: Comment: Amyloid angiopathy , SIADH    (+)seizures CVA with deficits   : left hand weakness. : june 2017.   Cardiovascular: Comment: S/p ablation    (+) Dyslipidemia, hypertension----. : . . fainting (syncope). :. dysrhythmias a-fib and a-flutter, .      (-) CHF, AKHTAR and orthopnea/PND   METS/Exercise Tolerance:     Hematologic:         Musculoskeletal:         GI/Hepatic:        (-) GERD   Renal/Genitourinary:     (+) BPH,       Endo:         Psychiatric:         Infectious Disease:         Malignancy:         Other:                     Physical Exam      Airway   Mallampati: II  TM distance: >3 FB  Neck ROM: full    Dental   (+) chipped    Cardiovascular   Rhythm and rate: regular      Pulmonary    breath sounds clear to auscultation                    Anesthesia Plan      History & Physical Review  History and physical reviewed and following examination; no interval change.    ASA Status:  3 .        Plan for MAC          Postoperative Care      Consents  Anesthetic plan, risks, benefits and alternatives discussed with:  Patient..                          .  Procedure: Procedure(s):  PHACOEMULSIFICATION CLEAR CORNEA WITH TORIC INTRAOCULAR LENS IMPLANT  Preop diagnosis: LEFT EYE CATARACT     Allergies   Allergen Reactions     Adrenalin [Epinephrine Hcl]      Hctz      hyponatremia     Past Medical History:   Diagnosis Date     Abnormal glucose      Atrial flutter (H)     ablation 2008     Diabetes mellitus (H)     not currently being treated, diet controlled at this time     Essential hypertension, benign      Hemorrhagic stroke (H) 2012     Other and unspecified hyperlipidemia      Sebaceous cyst      Seizure disorder (H) 7/21/2017     Stroke (H)      Syncope and collapse 11/15/01     Past Surgical History:   Procedure Laterality Date      ------------OTHER-------------  1/1/2008    Cardiac ablation     ADJACENT TISSUE TRANSFER, FOREHEAD/CHEEKS/CHIN/MOUTH/NECK/AXILLAE/GENITALIA/HANDS/FEET, < OR = 10CM       ARTHROTOMY SHOULDER, ROTATOR CUFF REPAIR, COMBINED  11/18/2011    Procedure:COMBINED ARTHROTOMY SHOULDER, ROTATOR CUFF REPAIR; LEFT SHOULDER OPEN ROTATOR CUFF REPAIR, BURSECTOMY, DISTAL CLAVICLE RESECTION; Surgeon:KHALIDA ZABALA; Location: OR     BIOPSY      multiple skin biopsies for skin cancers     CARDIAC SURGERY       CYSTOSCOPY, TRANSURETHRAL RESECTION (TUR) PROSTATE, COMBINED  8/15/2012    Procedure: COMBINED CYSTOSCOPY, TRANSURETHRAL RESECTION (TUR) PROSTATE;  CYSTOSCOPY, TRANSURETHRAL RESECTION  OF  PROSTATE WITH PLASMA LOOP;  Surgeon: Harmeet Kilgore MD;  Location:  OR     HERNIORRHAPHY INGUINAL Right 5/29/2015    Procedure: HERNIORRHAPHY INGUINAL;  Surgeon: Scottie Nunn MD;  Location:  [facility]     PHACOEMULSIFICATION CLEAR CORNEA WITH TORIC INTRAOCULAR LENS IMPLANT Right 7/24/2017    Procedure: PHACOEMULSIFICATION CLEAR CORNEA WITH TORIC INTRAOCULAR LENS IMPLANT;  RIGHT PHACOEMULSIFICATION CLEAR CORNEA WITH TORIC INTRAOCULAR LENS IMPLANT ;  Surgeon: Yoel Calero MD;  Location:  [facility]     Prior to Admission medications    Medication Sig Start Date End Date Taking? Authorizing Provider   amLODIPine (NORVASC) 2.5 MG tablet Take 1 tablet (2.5 mg) by mouth 2 times daily 7/21/17  Yes Forest Ashraf MD   metoprolol (LOPRESSOR) 50 MG tablet Take one-half tablet by mouth twice daily 7/21/17  Yes Forest Ashraf MD   fosinopril (MONOPRIL) 40 MG tablet take 1/2 tablet by mouth in the morning and 1 tab in the evening 7/21/17  Yes Forest Ashraf MD   levETIRAcetam (KEPPRA) 500 MG tablet Take 1 tablet (500 mg) by mouth 2 times daily 6/27/17  Yes Delores Owusu PA-C   COENZYME Q-10 PO Take 100 mg by mouth daily   Yes Unknown, Entered By History   omega-3 acid ethyl esters (LOVAZA) 1 G capsule Take 1 g by mouth daily   Yes  Unknown, Entered By History   vitamin B complex with vitamin C (VITAMIN  B COMPLEX) TABS tablet Take 1 tablet by mouth daily   Yes Unknown, Entered By History   Cholecalciferol (VITAMIN D) 2000 UNITS tablet Take 2,000 Units by mouth daily 5/4/17  Yes Jacob Herr MD   CYANOCOBALAMIN PO Take 1,000 mcg by mouth daily.   Yes Unknown, Entered By History   psyllium (METAMUCIL) 58.6 % POWD Take 1 teaspoonful by mouth 3 times daily as needed. Constipation.   Yes Unknown, Entered By History   magnesium hydroxide (MILK OF MAGNESIA) 400 MG/5ML suspension Take 30 mLs by mouth daily as needed. Constipation.   Yes Unknown, Entered By History   calcium carbonate (TUMS) 500 MG chewable tablet Take 1 chew tab by mouth daily as needed.   Yes Unknown, Entered By History   prune juice LIQD Take  by mouth daily (with breakfast).   Yes Unknown, Entered By History   calcium 600 MG tablet Take 1 tablet by mouth daily  12/20/12  Yes Jacob Herr MD   fluticasone (FLONASE) 50 MCG/ACT spray Spray 2 sprays into both nostrils daily 5/4/17   Jacob Herr MD     Current Facility-Administered Medications Ordered in Epic   Medication Dose Route Frequency Last Rate Last Dose     lidocaine 1 % 1 mL  1 mL Other Q1H PRN   1 mL at 08/21/17 0902     lactated ringers infusion  500 mL Intravenous Continuous 25 mL/hr at 08/21/17 0902 500 mL at 08/21/17 0902     No current [facility]-ordered outpatient prescriptions on file.     Wt Readings from Last 1 Encounters:   07/24/17 77.1 kg (170 lb)     Temp Readings from Last 1 Encounters:   08/21/17 36.1  C (97  F) (Temporal)     BP Readings from Last 6 Encounters:   08/21/17 148/78   07/24/17 105/79   07/21/17 142/76   06/30/17 152/75   06/27/17 104/62   05/04/17 160/81     Pulse Readings from Last 4 Encounters:   07/21/17 66   06/30/17 61   06/26/17 77   05/04/17 73     Resp Readings from Last 1 Encounters:   08/21/17 16     SpO2 Readings from Last 1  Encounters:   08/21/17 98%     Recent Labs   Lab Test  06/27/17   0744  06/26/17   0904   NA  138  135   POTASSIUM  4.4  3.7   CHLORIDE  104  101   CO2  28  25   ANIONGAP  6  9   GLC  104*  168*   BUN  16  15   CR  0.98  0.95   SOCORRO  8.6  8.6     Recent Labs   Lab Test  06/26/17   0904  05/04/17   1508   AST  15  17   ALT  18  19     Recent Labs   Lab Test  06/27/17   0744  06/26/17   0904   WBC  5.6  4.7   HGB  12.2*  12.7*   PLT  192  207     Recent Labs   Lab Test  07/12/13   1150  11/03/12   2115   INR  1.01  1.04      Recent Labs   Lab Test  07/12/13   2310  07/12/13   1645  11/03/12   2115   TROPI  <0.012  <0.012  0.018     RECENT LABS:   ECG:   ECHO:   CXR:

## 2017-08-21 NOTE — ANESTHESIA POSTPROCEDURE EVALUATION
Patient: Ernie Leary    Procedure(s):  LEFT PHACOEMULSIFICATION CLEAR CORNEA WITH TORIC INTRAOCULAR LENS IMPLANT  - Wound Class: I-Clean    Diagnosis:LEFT EYE CATARACT   Diagnosis Additional Information: No value filed.    Anesthesia Type:  MAC    Note:  Anesthesia Post Evaluation    Patient location during evaluation: PACU  Patient participation: Able to fully participate in evaluation  Level of consciousness: awake  Pain management: adequate  Airway patency: patent  Cardiovascular status: acceptable  Respiratory status: acceptable  Hydration status: acceptable  PONV: controlled     Anesthetic complications: None          Last vitals:  Vitals:    08/21/17 0843 08/21/17 1017 08/21/17 1041   BP: 148/78 125/73 130/64   Resp: 16 16 16   Temp: 36.1  C (97  F)     SpO2: 98% 96% 96%         Electronically Signed By: Char Guevara MD  August 21, 2017  1:25 PM

## 2017-08-21 NOTE — IP AVS SNAPSHOT
[facility]    [address]    [place] [address]    Phone:  [phone]    Fax:  [phone]                                       After Visit Summary   8/21/2017    Ernie Leary    MRN: [id number]           After Visit Summary Signature Page     I have received my discharge instructions, and my questions have been answered. I have discussed any challenges I see with this plan with the nurse or doctor.    ..........................................................................................................................................  Patient/Patient Representative Signature      ..........................................................................................................................................  Patient Representative Print Name and Relationship to Patient    ..................................................               ................................................  Date                                            Time    ..........................................................................................................................................  Reviewed by Signature/Title    ...................................................              ..............................................  Date                                                            Time

## 2017-08-21 NOTE — OR NURSING
D/c to home. D/c instructions to pt and pt wife. No questions or concerns.  Iv d/c, eye drops returned to pt

## 2017-08-21 NOTE — DISCHARGE INSTRUCTIONS
Dr. Yoel Calero  [facility]  [phone]  Post Operative Cataract Instructions        You may remove the eye shield on arrival home and begin eye drops as directed when you get home.      Wear eye shield when sleeping for protection for 5 days.      Do not rub the operated eye.      Light sensitivity may be noticed. Sunglasses may be worn for comfort.      Some discomfort and irritation may be noticed. Acetaminophen (Tylenol) or Ibuprofen (Advil) may be taken for discomfort. If pain persists please call Dr. Calero's office.      Keep the operated eye dry. You may wash your hair, bathe or shower, but keep the operated eye closed while doing so.       Use medication exactly as prescribed by your doctor. You may restart your regular home medications.      Bring any glaucoma medications with you to the clinic on your first post operative visit.      Call Dr. Calero's office if any of the following should occur:    o Any sudden vision changes  o Nausea or severe headache  o Increase in pain not controlled  o Or signs of infection (pus, increasing redness or tenderness)          [facility] Anesthesia Eye Care Center Discharge  Instructions  Anesthesia (Eye Care Center)   Adult Discharge Instructions    For 24 hours after surgery    1. Get plenty of rest.  Make arrangements to have a responsible adult stay with you for at least 6 hours after you leave the hospital.  2. Do not drive or use heavy equipment for 24 hours.    3. Do not drink alcohol for 24 hours.  4. Do not sign legal documents or make important decisions for 24 hours.  5. Avoid strenuous or risky activities. You may feel lightheaded.  If so, sit for a few minutes before standing.  Have someone help you get up.   6. Conscious sedation patients may resume a regular diet..  7. Any questions of medical nature, call your physician.

## 2017-08-21 NOTE — IP AVS SNAPSHOT
MRN:[id number]                      After Visit Summary   8/21/2017    Ernie Leary    MRN: [id number]           Thank you!     Thank you for choosing [facility] for your care. Our goal is always to provide you with excellent care. Hearing back from our patients is one way we can continue to improve our services. Please take a few minutes to complete the written survey that you may receive in the mail after you visit with us. Thank you!        Patient Information     Date Of Birth          9/23/1924        About your hospital stay     You were admitted on:  August 21, 2017 You last received care in the:  [facility]    You were discharged on:  August 21, 2017       Who to Call     For medical emergencies, please call 911.  For non-urgent questions about your medical care, please call your primary care provider or clinic, [phone]  For questions related to your surgery, please call your surgery clinic        Attending Provider     Provider Specialty    Yoel Calero MD Ophthalmology       Primary Care Provider Office Phone # Fax #    Forest Ashraf [phone]      Your next 10 appointments already scheduled     Sep 06, 2017  1:30 PM CDT   Office Visit with Forest Ashraf MD   [facility]    [address] [address] [phone]           Bring a current list of meds and any records pertaining to this visit. For Physicals, please bring immunization records and any forms needing to be filled out. Please arrive 10 minutes early to complete paperwork.              Further instructions from your care team       Dr. Yoel Calero  [facility]  [phone]  Post Operative Cataract Instructions        You may remove the eye shield on arrival home and begin eye drops as directed when you get home.      Wear eye shield when sleeping for protection for 5 days.      Do not rub the operated  "eye.      Light sensitivity may be noticed. Sunglasses may be worn for comfort.      Some discomfort and irritation may be noticed. Acetaminophen (Tylenol) or Ibuprofen (Advil) may be taken for discomfort. If pain persists please call Dr. Calero's office.      Keep the operated eye dry. You may wash your hair, bathe or shower, but keep the operated eye closed while doing so.       Use medication exactly as prescribed by your doctor. You may restart your regular home medications.      Bring any glaucoma medications with you to the clinic on your first post operative visit.      Call Dr. Calero's office if any of the following should occur:    o Any sudden vision changes  o Nausea or severe headache  o Increase in pain not controlled  o Or signs of infection (pus, increasing redness or tenderness)          [facility] Anesthesia Eye Care Center Discharge  Instructions  Anesthesia (Eye Care Center)   Adult Discharge Instructions    For 24 hours after surgery    1. Get plenty of rest.  Make arrangements to have a responsible adult stay with you for at least 6 hours after you leave the hospital.  2. Do not drive or use heavy equipment for 24 hours.    3. Do not drink alcohol for 24 hours.  4. Do not sign legal documents or make important decisions for 24 hours.  5. Avoid strenuous or risky activities. You may feel lightheaded.  If so, sit for a few minutes before standing.  Have someone help you get up.   6. Conscious sedation patients may resume a regular diet..  7. Any questions of medical nature, call your physician.    Pending Results     No orders found from 8/19/2017 to 8/22/2017.            Admission Information     Date & Time Provider Department Dept. Phone    8/21/2017 Yoel Calero MD [facility] [place] [phone]      Your Vitals Were     Blood Pressure Temperature Respirations Height Pulse Oximetry       125/73 97  F (36.1  C) (Temporal) 16 1.702 m (5' 7\") 96%       MyChart " Information     Kath[facility] gives you secure access to your electronic health record. If you see a primary care provider, you can also send messages to your care team and make appointments. If you have questions, please call your primary care clinic.  If you do not have a primary care provider, please call [phone] and they will assist you.        Care EveryWhere ID     This is your Care EveryWhere ID. This could be used by other organizations to access your [facility] medical records  [id number]        Equal Access to Services     CRISTA VAIL : Hadii aad ku hadasho Soomaali, waaxda luqadaha, qaybta kaalmada adeegyada, waxay josselynin hayrosenda brennerwilbertodo samson . So [facility] [phone].    ATENCIÓN: Si habla español, tiene a crane disposición servicios gratuitos de asistencia lingüística. [facility] [phone].    We comply with applicable federal civil rights laws and [place] laws. We do not discriminate on the basis of race, color, national origin, age, disability sex, sexual orientation or gender identity.               Review of your medicines      UNREVIEWED medicines. Ask your doctor about these medicines        Dose / Directions    amLODIPine 2.5 MG tablet   Commonly known as:  NORVASC   Used for:  Essential hypertension, benign        Dose:  2.5 mg   Take 1 tablet (2.5 mg) by mouth 2 times daily   Quantity:  180 tablet   Refills:  3       calcium 600 MG tablet        Dose:  1 tablet   Take 1 tablet by mouth daily   Quantity:  180 tablet   Refills:  3       calcium carbonate 500 MG chewable tablet   Commonly known as:  TUMS        Dose:  1 chew tab   Take 1 chew tab by mouth daily as needed.   Refills:  0       COENZYME Q-10 PO        Dose:  100 mg   Take 100 mg by mouth daily   Refills:  0       CYANOCOBALAMIN PO        Dose:  1000 mcg   Take 1,000 mcg by mouth daily.   Refills:  0       fluticasone 50 MCG/ACT spray   Commonly known as:  FLONASE   Used for:  Chronic rhinitis        Dose:  2 spray   Spray 2  sprays into both nostrils daily   Quantity:  1 Bottle   Refills:  1       fosinopril 40 MG tablet   Commonly known as:  MONOPRIL   Used for:  Essential hypertension, benign        take 1/2 tablet by mouth in the morning and 1 tab in the evening   Quantity:  135 tablet   Refills:  3       levETIRAcetam 500 MG tablet   Commonly known as:  KEPPRA        Dose:  500 mg   Take 1 tablet (500 mg) by mouth 2 times daily   Quantity:  60 tablet   Refills:  0       magnesium hydroxide 400 MG/5ML suspension   Commonly known as:  MILK OF MAGNESIA        Dose:  30 mL   Take 30 mLs by mouth daily as needed. Constipation.   Refills:  0       metoprolol 50 MG tablet   Commonly known as:  LOPRESSOR   Used for:  Essential hypertension, benign        Take one-half tablet by mouth twice daily   Quantity:  90 tablet   Refills:  3       omega-3 acid ethyl esters 1 G capsule   Commonly known as:  Lovaza        Dose:  1 g   Take 1 g by mouth daily   Refills:  0       prune juice Liqd        Take  by mouth daily (with breakfast).   Refills:  0       psyllium 58.6 % Powd   Commonly known as:  METAMUCIL        Dose:  1 teaspoonful   Take 1 teaspoonful by mouth 3 times daily as needed. Constipation.   Refills:  0       vitamin B complex with vitamin C Tabs tablet        Dose:  1 tablet   Take 1 tablet by mouth daily   Refills:  0       vitamin D 2000 UNITS tablet   Used for:  Recurrent falls        Dose:  2000 Units   Take 2,000 Units by mouth daily   Quantity:  100 tablet   Refills:  3                Protect others around you: Learn how to safely use, store and throw away your medicines at www.disposemymeds.org.             Medication List: This is a list of all your medications and when to take them. Check marks below indicate your daily home schedule. Keep this list as a reference.      Medications           Morning Afternoon Evening Bedtime As Needed    amLODIPine 2.5 MG tablet   Commonly known as:  NORVASC   Take 1 tablet (2.5 mg) by mouth  2 times daily                                calcium 600 MG tablet   Take 1 tablet by mouth daily                                calcium carbonate 500 MG chewable tablet   Commonly known as:  TUMS   Take 1 chew tab by mouth daily as needed.                                COENZYME Q-10 PO   Take 100 mg by mouth daily                                CYANOCOBALAMIN PO   Take 1,000 mcg by mouth daily.                                fluticasone 50 MCG/ACT spray   Commonly known as:  FLONASE   Spray 2 sprays into both nostrils daily                                fosinopril 40 MG tablet   Commonly known as:  MONOPRIL   take 1/2 tablet by mouth in the morning and 1 tab in the evening                                levETIRAcetam 500 MG tablet   Commonly known as:  KEPPRA   Take 1 tablet (500 mg) by mouth 2 times daily                                magnesium hydroxide 400 MG/5ML suspension   Commonly known as:  MILK OF MAGNESIA   Take 30 mLs by mouth daily as needed. Constipation.                                metoprolol 50 MG tablet   Commonly known as:  LOPRESSOR   Take one-half tablet by mouth twice daily                                omega-3 acid ethyl esters 1 G capsule   Commonly known as:  Lovaza   Take 1 g by mouth daily                                prune juice Liqd   Take  by mouth daily (with breakfast).                                psyllium 58.6 % Powd   Commonly known as:  METAMUCIL   Take 1 teaspoonful by mouth 3 times daily as needed. Constipation.                                vitamin B complex with vitamin C Tabs tablet   Take 1 tablet by mouth daily                                vitamin D 2000 UNITS tablet   Take 2,000 Units by mouth daily

## 2017-08-28 NOTE — OP NOTE
PREOPERATIVE DIAGNOSIS: Visually significant cataract, Right eye   POSTOPERATIVE DIAGNOSIS: Same   PROCEDURES:   1. Cataract extraction with intraocular lens implant Right eye.  SURGEON: Yoel Calero MD   INDICATIONS: The patient Ernie Leary presented to the eye clinic with decreased vision secondary to cataract in the Right eye. The risks, benefits and alternatives to cataract extraction were discussed. The patient elected to proceed. All questions were answered to the patient's satisfaction.   DESCRIPTION OF PROCEDURE: The patient was brought to the operating suite and the Right eye was prepped and draped in a sterile fashion.  A paracentesis was created using the Supersharp blade.  1% non-preserved Lidocaine was then injected into the anterior chamber, followed by viscoelastic. A temporal clear corneal wound was created using the keratome. The cystotome was introduced and an anterior capsulorrhexis begun which was completed using the Utrata forceps. Hydrodissection was performed, and the nucleus was rotated. The phacoemulsification handpiece was introduced, and the nucleus was grooved and subsequently cracked. The nucleus was rotated and chopped into smaller segments using the Bernadette chopper. These segments were emulsified and aspirated from the eye. The remaining lens cortex was removed using irrigation-aspiration. The capsular bag was filled with viscoelastic, and an intraocular lens was injected into the capsular bag (see below). Remaining viscoelastic was removed using irrigation-aspiration. The anterior chamber was instilled with balanced salt solution and found to be watertight.  0.1cc Cefuroxime was then injected via the paracentesis.  Betadine followed by Durezol drops were applied to the ocular surface which was then covered with a shield. The patient was transferred to the Post Anesthesia Care Unit in satisfactory condition.   PLAN: The patient will be discharged to home and will follow up  tomorrow morning in the eye clinic.  EBL:  None  Complications:  None  Specimens:  None  Findings:  Cataract    Implant Name Type Inv. Item Serial No.  Lot No. LRB No. Used   EYE IMP IOL CESAR 1-PIECE TECNIS TORIC +26.5 [id number] Lens/Eye Implant EYE IMP IOL CESAR 1-PIECE TECNIS TORIC +26.5 [id number] [employer]   Right 1     CC:  Yoel Calero MD - [facility]

## 2017-09-06 ENCOUNTER — OFFICE VISIT (OUTPATIENT)
Dept: FAMILY MEDICINE | Facility: CLINIC | Age: 82
End: 2017-09-06
Payer: MEDICARE

## 2017-09-06 VITALS
SYSTOLIC BLOOD PRESSURE: 137 MMHG | DIASTOLIC BLOOD PRESSURE: 67 MMHG | HEIGHT: 67 IN | TEMPERATURE: 96.9 F | HEART RATE: 64 BPM | OXYGEN SATURATION: 96 % | WEIGHT: 170 LBS | BODY MASS INDEX: 26.68 KG/M2

## 2017-09-06 DIAGNOSIS — I48.92 ATRIAL FLUTTER, UNSPECIFIED TYPE (H): ICD-10-CM

## 2017-09-06 DIAGNOSIS — G40.909 SEIZURE DISORDER (H): ICD-10-CM

## 2017-09-06 DIAGNOSIS — I10 ESSENTIAL HYPERTENSION: ICD-10-CM

## 2017-09-06 DIAGNOSIS — J30.0 CHRONIC VASOMOTOR RHINITIS: Primary | ICD-10-CM

## 2017-09-06 DIAGNOSIS — Z86.79 HISTORY OF INTRACRANIAL HEMORRHAGE: ICD-10-CM

## 2017-09-06 PROCEDURE — 99213 OFFICE O/P EST LOW 20 MIN: CPT | Performed by: INTERNAL MEDICINE

## 2017-09-06 RX ORDER — IPRATROPIUM BROMIDE 42 UG/1
2 SPRAY, METERED NASAL 4 TIMES DAILY PRN
Qty: 3 BOX | Refills: 3 | Status: SHIPPED | OUTPATIENT
Start: 2017-09-06 | End: 2018-06-28

## 2017-09-06 NOTE — MR AVS SNAPSHOT
"              After Visit Summary   9/6/2017    Ernie Leary    MRN: [id number]           Patient Information     Date Of Birth          9/23/1924        Visit Information        Provider Department      9/6/2017 1:30 PM Forest Ashraf MD [facility]        Today's Diagnoses     Chronic vasomotor rhinitis    -  1    History of intracranial hemorrhage        Seizure disorder (H)        Essential hypertension        Atrial flutter, unspecified type (H)           Follow-ups after your visit        Who to contact     If you have questions or need follow up information about today's clinic visit or your schedule please contact [facility] directly at [phone].  Normal or non-critical lab and imaging results will be communicated to you by MyChart, letter or phone within 4 business days after the clinic has received the results. If you do not hear from us within 7 days, please contact the clinic through Boommy Fashionhart or phone. If you have a critical or abnormal lab result, we will notify you by phone as soon as possible.  Submit refill requests through [employer] or call your pharmacy and they will forward the refill request to us. Please allow 3 business days for your refill to be completed.          Additional Information About Your Visit        MyChart Information     [employer] gives you secure access to your electronic health record. If you see a primary care provider, you can also send messages to your care team and make appointments. If you have questions, please call your primary care clinic.  If you do not have a primary care provider, please call [phone] and they will assist you.        Care EveryWhere ID     This is your Care EveryWhere ID. This could be used by other organizations to access your [facility] medical records  [id number]        Your Vitals Were     Pulse Temperature Height Pulse Oximetry BMI (Body Mass Index)       64 96.9  F (36.1  C) (Tympanic) 5' 7\" (1.702 m) 96% 26.63 " kg/m2        Blood Pressure from Last 3 Encounters:   09/06/17 137/67   08/21/17 130/64   07/24/17 105/79    Weight from Last 3 Encounters:   09/06/17 170 lb (77.1 kg)   07/24/17 170 lb (77.1 kg)   07/21/17 170 lb 12.8 oz (77.5 kg)              Today, you had the following     No orders found for display         Today's Medication Changes          These changes are accurate as of: 9/6/17  2:01 PM.  If you have any questions, ask your nurse or doctor.               Start taking these medicines.        Dose/Directions    ipratropium 0.06 % spray   Commonly known as:  ATROVENT   Used for:  Chronic vasomotor rhinitis   Started by:  Forest Ashraf MD        Dose:  2 spray   Spray 2 sprays into both nostrils 4 times daily as needed for rhinitis   Quantity:  3 Box   Refills:  3            Where to get your medicines      These medications were sent to [id number] - [place], [place] - [address]  [address] [place] [address]     Phone:  [phone]     ipratropium 0.06 % spray                Primary Care Provider Office Phone # Fax #    Forest Ashraf [phone]       [facility] [address]  [place] [address]        Equal Access to Services     [facility] GENNA : Hadii aad ku hadasho Soomaali, waaxda luqadaha, qaybta kaalmada adeegyada, waxay linus samson . So [facility] [phone].    ATENCIÓN: Si habla español, tiene a crane disposición servicios gratuitos de asistencia lingüística. Llame al [phone].    We comply with applicable federal civil rights laws and [place] laws. We do not discriminate on the basis of race, color, national origin, age, disability sex, sexual orientation or gender identity.            Thank you!     Thank you for choosing [facility]  for your care. Our goal is always to provide you with excellent care. Hearing back from our patients is one way we can continue to improve our services. Please take a  few minutes to complete the written survey that you may receive in the mail after your visit with us. Thank you!             Your Updated Medication List - Protect others around you: Learn how to safely use, store and throw away your medicines at www.disposemymeds.org.          This list is accurate as of: 9/6/17  2:01 PM.  Always use your most recent med list.                   Brand Name Dispense Instructions for use Diagnosis    amLODIPine 2.5 MG tablet    NORVASC    180 tablet    Take 1 tablet (2.5 mg) by mouth 2 times daily    Essential hypertension, benign       calcium 600 MG tablet     180 tablet    Take 1 tablet by mouth daily        calcium carbonate 500 MG chewable tablet    TUMS     Take 1 chew tab by mouth daily as needed.        COENZYME Q-10 PO      Take 100 mg by mouth daily        CYANOCOBALAMIN PO      Take 1,000 mcg by mouth daily.        fluticasone 50 MCG/ACT spray    FLONASE    1 Bottle    Spray 2 sprays into both nostrils daily    Chronic rhinitis       fosinopril 40 MG tablet    MONOPRIL    135 tablet    take 1/2 tablet by mouth in the morning and 1 tab in the evening    Essential hypertension, benign       ipratropium 0.06 % spray    ATROVENT    3 Box    Spray 2 sprays into both nostrils 4 times daily as needed for rhinitis    Chronic vasomotor rhinitis       levETIRAcetam 500 MG tablet    KEPPRA    60 tablet    Take 1 tablet (500 mg) by mouth 2 times daily    Seizure disorder (H)       magnesium hydroxide 400 MG/5ML suspension    MILK OF MAGNESIA     Take 30 mLs by mouth daily as needed. Constipation.        metoprolol 50 MG tablet    LOPRESSOR    90 tablet    Take one-half tablet by mouth twice daily    Essential hypertension, benign       omega-3 acid ethyl esters 1 G capsule    Lovaza     Take 1 g by mouth daily        prune juice Liqd      Take  by mouth daily (with breakfast).        psyllium 58.6 % Powd    METAMUCIL     Take 1 teaspoonful by mouth 3 times daily as needed.  Constipation.        vitamin B complex with vitamin C Tabs tablet      Take 1 tablet by mouth daily        vitamin D 2000 UNITS tablet     100 tablet    Take 2,000 Units by mouth daily    Recurrent falls

## 2017-09-06 NOTE — NURSING NOTE
"Chief Complaint   Patient presents with     New Patient        Initial /78 (BP Location: Left arm, Patient Position: Chair, Cuff Size: Adult Regular)  Pulse 64  Temp 96.9  F (36.1  C) (Tympanic)  Ht 5' 7\" (1.702 m)  Wt 170 lb (77.1 kg)  SpO2 96%  BMI 26.63 kg/m2 Estimated body mass index is 26.63 kg/(m^2) as calculated from the following:    Height as of this encounter: 5' 7\" (1.702 m).    Weight as of this encounter: 170 lb (77.1 kg)..    BP completed using cuff size: regular  MEDICATIONS REVIEWED  SOCIAL AND FAMILY HX REVIEWED  Mary Petty CMA  "

## 2017-09-06 NOTE — PROGRESS NOTES
SUBJECTIVE:   Ernie Leary is a 89 year old male who presents to clinic today for the following health issues:      New Patient/Transfer of Care    89 year old man with cerebral amyloid angiopathy status post hemorrhagic stroke also with hypertension, atrial fibrillation, recent seizure.  He lives in a senior cooperative with his wife.  He was a former smoker.  He recently had cataract surgery.  No seizure activity since hospital discharge.  He is bothered by moderate clear nasal drainage in both nostrils that has been present for years without associated nasal congestion, fevers or facial pain.       Problem list and histories reviewed & adjusted, as indicated.  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)     Transient cerebral ischemia     Cerebral amyloid angiopathy (H)     History of intracranial hemorrhage     Need for prophylactic vaccination and inoculation against influenza     TIA (transient ischemic attack)     Seizure disorder (H)     Past Surgical History:   Procedure Laterality Date     ------------OTHER-------------  1/1/2008    Cardiac ablation     ADJACENT TISSUE TRANSFER, FOREHEAD/CHEEKS/CHIN/MOUTH/NECK/AXILLAE/GENITALIA/HANDS/FEET, < OR = 10CM       ARTHROTOMY SHOULDER, ROTATOR CUFF REPAIR, COMBINED  11/18/2011    Procedure:COMBINED ARTHROTOMY SHOULDER, ROTATOR CUFF REPAIR; LEFT SHOULDER OPEN ROTATOR CUFF REPAIR, BURSECTOMY, DISTAL CLAVICLE RESECTION; Surgeon:KHALIDA ZABALA;  Location: OR     BIOPSY      multiple skin biopsies for skin cancers     CARDIAC SURGERY       CYSTOSCOPY, TRANSURETHRAL RESECTION (TUR) PROSTATE, COMBINED  8/15/2012    Procedure: COMBINED CYSTOSCOPY, TRANSURETHRAL RESECTION (TUR) PROSTATE;  CYSTOSCOPY, TRANSURETHRAL RESECTION  OF  PROSTATE WITH PLASMA LOOP;  Surgeon: Harmeet Kilgore MD;  Location:  OR     HERNIORRHAPHY INGUINAL Right 5/29/2015    Procedure: HERNIORRHAPHY INGUINAL;  Surgeon: Scottie Nunn MD;  Location:  [facility]     PHACOEMULSIFICATION CLEAR CORNEA WITH TORIC INTRAOCULAR LENS IMPLANT Right 7/24/2017    Procedure: PHACOEMULSIFICATION CLEAR CORNEA WITH TORIC INTRAOCULAR LENS IMPLANT;  RIGHT PHACOEMULSIFICATION CLEAR CORNEA WITH TORIC INTRAOCULAR LENS IMPLANT ;  Surgeon: Yoel Calero MD;  Location:  [facility]     PHACOEMULSIFICATION CLEAR CORNEA WITH TORIC INTRAOCULAR LENS IMPLANT Left 8/21/2017    Procedure: PHACOEMULSIFICATION CLEAR CORNEA WITH TORIC INTRAOCULAR LENS IMPLANT;  LEFT PHACOEMULSIFICATION CLEAR CORNEA WITH TORIC INTRAOCULAR LENS IMPLANT ;  Surgeon: Yoel Calero MD;  Location: [facility]       Social History   Substance Use Topics     Smoking status: Former Smoker     Packs/day: 2.00     Years: 30.00     Types: Cigarettes     Quit date: 1/1/1981     Smokeless tobacco: Never Used     Alcohol use No     History reviewed. No pertinent family history.      Current Outpatient Prescriptions   Medication Sig Dispense Refill     amLODIPine (NORVASC) 2.5 MG tablet Take 1 tablet (2.5 mg) by mouth 2 times daily 180 tablet 3     metoprolol (LOPRESSOR) 50 MG tablet Take one-half tablet by mouth twice daily 90 tablet 3     fosinopril (MONOPRIL) 40 MG tablet take 1/2 tablet by mouth in the morning and 1 tab in the evening 135 tablet 3     levETIRAcetam (KEPPRA) 500 MG tablet Take 1 tablet (500 mg) by mouth 2 times daily 60 tablet 0     COENZYME Q-10 PO Take 100 mg by mouth daily       omega-3 acid ethyl esters (LOVAZA) 1 G capsule Take 1 g by mouth  "daily       vitamin B complex with vitamin C (VITAMIN  B COMPLEX) TABS tablet Take 1 tablet by mouth daily       Cholecalciferol (VITAMIN D) 2000 UNITS tablet Take 2,000 Units by mouth daily 100 tablet 3     fluticasone (FLONASE) 50 MCG/ACT spray Spray 2 sprays into both nostrils daily 1 Bottle 1     CYANOCOBALAMIN PO Take 1,000 mcg by mouth daily.       psyllium (METAMUCIL) 58.6 % POWD Take 1 teaspoonful by mouth 3 times daily as needed. Constipation.       magnesium hydroxide (MILK OF MAGNESIA) 400 MG/5ML suspension Take 30 mLs by mouth daily as needed. Constipation.       calcium carbonate (TUMS) 500 MG chewable tablet Take 1 chew tab by mouth daily as needed.       prune juice LIQD Take  by mouth daily (with breakfast).       calcium 600 MG tablet Take 1 tablet by mouth daily  180 tablet 3     Allergies   Allergen Reactions     Adrenalin [Epinephrine Hcl]      Hctz      hyponatremia         Reviewed and updated as needed this visit by clinical staff       Reviewed and updated as needed this visit by Provider         ROS:  Constitutional, HEENT (see HPI), cardiovascular, pulmonary, gi and gu systems are negative, except as otherwise noted.      OBJECTIVE:   /67  Pulse 64  Temp 96.9  F (36.1  C) (Tympanic)  Ht 5' 7\" (1.702 m)  Wt 170 lb (77.1 kg)  SpO2 96%  BMI 26.63 kg/m2  Body mass index is 26.63 kg/(m^2).  GENERAL: healthy, alert and no distress  HENT: ear canals and TM's normal, nose with clear nasal discharge in both nostrils with nasal drainage observed in posterior pharynx mouth othwerwise without ulcers or lesions  NEURO: Normal strength and tone, mentation intact and speech normal  PSYCH: mentation appears normal, affect normal/bright    Diagnostic Test Results:  none     ASSESSMENT/PLAN:       1. Chronic vasomotor rhinitis  Try below for symptoms   - ipratropium (ATROVENT) 0.06 % spray; Spray 2 sprays into both nostrils 4 times daily as needed for rhinitis  Dispense: 3 Box; Refill: 3    2. " History of intracranial hemorrhage  Not a candidate for anticoagulants for stroke prophylaxis because of this     3. Seizure disorder (H)  Now well controlled on Keppra, he will follow up with Dr. Alcantar    4. Essential hypertension  Under good control     5. Atrial flutter, unspecified type (H)  Status post ablation; see above regarding anticoagulation       FUTURE APPOINTMENTS:       - 6 months or sooner as symptoms dictate     Forest Ashraf MD  [facility]

## 2017-09-12 ENCOUNTER — ALLIED HEALTH/NURSE VISIT (OUTPATIENT)
Dept: NURSING | Facility: CLINIC | Age: 82
End: 2017-09-12
Payer: MEDICARE

## 2017-09-12 DIAGNOSIS — Z23 NEED FOR PROPHYLACTIC VACCINATION AND INOCULATION AGAINST INFLUENZA: Primary | ICD-10-CM

## 2017-09-12 PROCEDURE — G0008 ADMIN INFLUENZA VIRUS VAC: HCPCS

## 2017-09-12 PROCEDURE — 90662 IIV NO PRSV INCREASED AG IM: CPT

## 2017-09-12 PROCEDURE — 99207 ZZC NO CHARGE NURSE ONLY: CPT

## 2017-09-12 NOTE — MR AVS SNAPSHOT
After Visit Summary   9/12/2017    Ernie Leary    MRN: [id number]           Patient Information     Date Of Birth          9/23/1924        Visit Information        Provider Department      9/12/2017 12:30 PM  [place] NURSE [facility] [place]        Today's Diagnoses     Need for prophylactic vaccination and inoculation against influenza    -  1       Follow-ups after your visit        Who to contact     If you have questions or need follow up information about today's clinic visit or your schedule please contact [facility] [place] directly at [phone].  Normal or non-critical lab and imaging results will be communicated to you by Webcomhart, letter or phone within 4 business days after the clinic has received the results. If you do not hear from us within 7 days, please contact the clinic through [employer] or phone. If you have a critical or abnormal lab result, we will notify you by phone as soon as possible.  Submit refill requests through Bocom or call your pharmacy and they will forward the refill request to us. Please allow 3 business days for your refill to be completed.          Additional Information About Your Visit        MyChart Information     [employer] gives you secure access to your electronic health record. If you see a primary care provider, you can also send messages to your care team and make appointments. If you have questions, please call your primary care clinic.  If you do not have a primary care provider, please call [phone] and they will assist you.        Care EveryWhere ID     This is your Care EveryWhere ID. This could be used by other organizations to access your [facility] medical records  [id number]         Blood Pressure from Last 3 Encounters:   09/06/17 137/67   08/21/17 130/64   07/24/17 105/79    Weight from Last 3 Encounters:   09/06/17 170 lb (77.1 kg)   07/24/17 170 lb (77.1 kg)   07/21/17 170 lb 12.8 oz (77.5 kg)              We Performed the  Following     ADMIN INFLUENZA (For MEDICARE Patients ONLY) []     FLU VACCINE, INCREASED ANTIGEN, PRESV FREE, AGE 65+ [01407]        Primary Care Provider Office Phone # Fax #    Forest Ashraf [phone]       [facility] [address]  [facility] [address]        Equal Access to Services     [facility]: Hadii aad ku hadasho Soomaali, waaxda luqadaha, qaybta kaalmada adeegyada, waxay idiin hayaan adeeg kharash la'aan . So [facility] [phone].    ATENCIÓN: Si habla español, tiene a crane disposición servicios gratuitos de asistencia lingüística. Marguerite al [phone].    We comply with applicable federal civil rights laws and [place] laws. We do not discriminate on the basis of race, color, national origin, age, disability sex, sexual orientation or gender identity.            Thank you!     Thank you for choosing [facility]  for your care. Our goal is always to provide you with excellent care. Hearing back from our patients is one way we can continue to improve our services. Please take a few minutes to complete the written survey that you may receive in the mail after your visit with us. Thank you!             Your Updated Medication List - Protect others around you: Learn how to safely use, store and throw away your medicines at www.disposemymeds.org.          This list is accurate as of: 9/12/17  1:08 PM.  Always use your most recent med list.                   Brand Name Dispense Instructions for use Diagnosis    amLODIPine 2.5 MG tablet    NORVASC    180 tablet    Take 1 tablet (2.5 mg) by mouth 2 times daily    Essential hypertension, benign       calcium 600 MG tablet     180 tablet    Take 1 tablet by mouth daily        calcium carbonate 500 MG chewable tablet    TUMS     Take 1 chew tab by mouth daily as needed.        COENZYME Q-10 PO      Take 100 mg by mouth daily        CYANOCOBALAMIN PO      Take 1,000 mcg by mouth daily.        fluticasone 50  MCG/ACT spray    FLONASE    1 Bottle    Spray 2 sprays into both nostrils daily    Chronic rhinitis       fosinopril 40 MG tablet    MONOPRIL    135 tablet    take 1/2 tablet by mouth in the morning and 1 tab in the evening    Essential hypertension, benign       ipratropium 0.06 % spray    ATROVENT    3 Box    Spray 2 sprays into both nostrils 4 times daily as needed for rhinitis    Chronic vasomotor rhinitis       levETIRAcetam 500 MG tablet    KEPPRA    60 tablet    Take 1 tablet (500 mg) by mouth 2 times daily        magnesium hydroxide 400 MG/5ML suspension    MILK OF MAGNESIA     Take 30 mLs by mouth daily as needed. Constipation.        metoprolol 50 MG tablet    LOPRESSOR    90 tablet    Take one-half tablet by mouth twice daily    Essential hypertension, benign       omega-3 acid ethyl esters 1 G capsule    Lovaza     Take 1 g by mouth daily        prune juice Liqd      Take  by mouth daily (with breakfast).        psyllium 58.6 % Powd    METAMUCIL     Take 1 teaspoonful by mouth 3 times daily as needed. Constipation.        vitamin B complex with vitamin C Tabs tablet      Take 1 tablet by mouth daily        vitamin D 2000 UNITS tablet     100 tablet    Take 2,000 Units by mouth daily    Recurrent falls

## 2017-09-12 NOTE — PROGRESS NOTES
Injectable Influenza Immunization Documentation    1.  Are you sick today? (Fever of 100.5 or higher on the day of the clinic)   No    2.  Have you ever had Guillain-[place] Syndrome within 6 weeks of an influenza vaccionation?  No    3. Do you have a life-threatening allergy to eggs?  No    4. Do you have a life-threatening allergy to a component of the vaccine? May include antibiotics, gelatin or latex.  No     5. Have you ever had a reaction to a dose of flu vaccine that needed immediate medical attention?  No     Form completed by francisco chang

## 2017-09-15 NOTE — OP NOTE
PREOPERATIVE DIAGNOSIS: Visually significant cataract, Left eye   POSTOPERATIVE DIAGNOSIS: Same   PROCEDURES:   1. Cataract extraction with intraocular lens implant Left eye.  SURGEON: Yoel Calero MD   INDICATIONS: The patient Ernie eLary presented to the eye clinic with decreased vision secondary to cataract in the Left eye. The risks, benefits and alternatives to cataract extraction were discussed. The patient elected to proceed. All questions were answered to the patient's satisfaction.   DESCRIPTION OF PROCEDURE: The patient was brought to the operating suite and the Left eye was prepped and draped in a sterile fashion.  A paracentesis was created using the Supersharp blade.  1% non-preserved Lidocaine was then injected into the anterior chamber, followed by viscoelastic. A temporal clear corneal wound was created using the keratome. The cystotome was introduced and an anterior capsulorrhexis begun which was completed using the Utrata forceps. Hydrodissection was performed, and the nucleus was rotated. The phacoemulsification handpiece was introduced, and the nucleus was grooved and subsequently cracked. The nucleus was rotated and chopped into smaller segments using the Bernadette chopper. These segments were emulsified and aspirated from the eye. The remaining lens cortex was removed using irrigation-aspiration. The capsular bag was filled with viscoelastic, and an intraocular lens was injected into the capsular bag (see below). Remaining viscoelastic was removed using irrigation-aspiration. The anterior chamber was instilled with balanced salt solution and found to be watertight.  0.1cc Cefuroxime was then injected via the paracentesis.  Betadine followed by Durezol drops were applied to the ocular surface which was then covered with a shield. The patient was transferred to the Post Anesthesia Care Unit in satisfactory condition.   PLAN: The patient will be discharged to home and will follow up tomorrow  morning in the eye clinic.  EBL:  None  Complications:  None  Specimens:  None  Findings:  Cataract    Implant Name Type Inv. Item Serial No.  Lot No. LRB No. Used   EYE IMP IOL CESAR 1-PIECE TECNIS TORIC +28.5 [id number] Lens/Eye Implant EYE IMP IOL CESAR 1-PIECE TECNIS TORIC +28.5 [id number] [employer]   Left 1     CC:  Yoel Calero MD - [facility]

## 2017-10-15 ENCOUNTER — OFFICE VISIT (OUTPATIENT)
Dept: URGENT CARE | Facility: URGENT CARE | Age: 82
End: 2017-10-15
Payer: MEDICARE

## 2017-10-15 ENCOUNTER — NURSE TRIAGE (OUTPATIENT)
Dept: NURSING | Facility: CLINIC | Age: 82
End: 2017-10-15

## 2017-10-15 VITALS
DIASTOLIC BLOOD PRESSURE: 83 MMHG | HEART RATE: 56 BPM | TEMPERATURE: 97.9 F | WEIGHT: 173.25 LBS | SYSTOLIC BLOOD PRESSURE: 152 MMHG | BODY MASS INDEX: 27.13 KG/M2

## 2017-10-15 DIAGNOSIS — L30.9 DERMATITIS: Primary | ICD-10-CM

## 2017-10-15 PROCEDURE — 99213 OFFICE O/P EST LOW 20 MIN: CPT | Performed by: NURSE PRACTITIONER

## 2017-10-15 RX ORDER — TRIAMCINOLONE ACETONIDE 1 MG/G
OINTMENT TOPICAL
Qty: 160 G | Refills: 0 | Status: SHIPPED | OUTPATIENT
Start: 2017-10-15 | End: 2019-10-16

## 2017-10-15 NOTE — PATIENT INSTRUCTIONS
Self-Care for Skin Rashes     Pat your skin dry. Do not rub.     When your skin reacts to a substance your body is sensitive to, it can cause a rash. You can treat most rashes at home by keeping the skin clean and dry. Many rashes may get better on their own within 2 to 3 days. You may need medical attention if your rash itches, drains, or hurts, particularly if the rash is getting worse.  What can cause a skin rash?    Sun poisoning, caused by too much exposure to the sun    An irritant or allergic reaction to a certain type of food, plant, or chemical, such as  shellfish, poison ivy, and or cleaning products    An infection caused by a fungus (ringworm), virus (chickenpox), or bacteria (strep)    Bites or infestation caused by insects or pests, such as ticks, lice, or mites    Dry skin, which is often seen during the winter months and in older people  How can I control itching and skin damage?    Take soothing lukewarm baths in a colloidal oatmeal product. You can buy this at the [employer].    Do your best not to scratch. Clip fingernails short, especially in young children, to reduce skin damage if scratching does occur.    Use moisturizing skin lotion instead of scratching your dry skin.    Use sunscreen whenever going out into direct sun.    Use only mild cleansing agents whenever possible.    Wash with mild, nonirritating soap and warm water.    Wear clothing that breathes, such as cotton shirts or canvas shoes.    If fluid is seeping from the rash, cover it loosely with clean gauze to absorb the discharge.    Many rashes are contagious. Prevent the rash from spreading to others by washing your hands often before or after touching others with any skin rash.  Use medicine    Antihistamines such as diphenhydramine can help control itching. But use with caution because they can make you drowsy.    Using over-the-counter hydrocortisone cream on small rashes may help reduce swelling and itching    Most  over-the-counter antifungal medicines can treat athlete s foot and many other fungal infections of the skin.  Check with your healthcare provider  Call your healthcare provider if:    You were told that you have a fungal infection on your skin to make sure you have the correct type of medicine.    You have questions or concerns about medicines or their side effects.     Call 911  Call 911 if either of these occur:    Your tongue or lips start to swell    You have difficulty breathing      Call your healthcare provider  Call your healthcare provider if any of these occur:    Temperature of more than 101.0 F (38.3 C), or as directed    Sore throat, a cough, or unusual fatigue    Red, oozy, or painful rash gets worse. These are signs of infection.    Rash covers your face, genitals, or most of your body    Crusty sores or red rings that begin to spread    You were exposed to someone who has a contagious rash, such as scabies or lice.    Red bull s-eye rash with a white center (a sign of Lyme disease)    You were told that you have resistant bacteria (MRSA) on your skin.   Date Last Reviewed: 5/12/2015 2000-2017 [employer]. [address] [place] [address]. All rights reserved. This information is not intended as a substitute for professional medical care. Always follow your healthcare professional's instructions.        What is Atopic Dermatitis?  Atopic dermatitis (also called eczema) causes chronic skin irritation. It is often found in infants, teens, and adults. This disease often runs in families (is genetic). It may also be linked to allergies, such as hay fever and sometimes asthma. Patches of skin become dry, red, itchy, and scaly. In older adults, abnormally dry skin is often called xerosis. Sometimes eczema is only on the hands or feet. It often improves when the skin is well hydrated. It gets worse when the skin is dry. You can help control symptoms by practicing good self-care.  Avoid anything that causes flare-ups (such as sunburn or vigorous scratching).  Where do you have symptoms?  Atopic dermatitis symptoms can appear anywhere on the body. But in most cases they vary based on the person s age. In infants, irritation is often seen on the cheeks, chin, near the mouth, and under the eyelids. In children ages 2 through 10, skin folds, such as the backs of the knees, or in the arm crease, are most often affected. In children 11 and older and in adults, symptoms can affect many areas.  What triggers symptoms?  Symptoms flare because of many things. These include skin dryness, scratching, stress, harsh soaps, and irritants such as dust or wool. Try to avoid anything that causes flare-ups.  Recognizing what causes flare-ups  To figure out what causes atopic dermatitis to flare, keep a list of things that seem to affect your skin. Start by filling in the spaces below. Then keep writing them down in a notebook or diary. The things that affect each person vary. So keep your own list and try to avoid your triggers.    Date Last Reviewed: 2/1/2017 2000-2017 [employer]. [address] [place] [address], [place] [address]. All rights reserved. This information is not intended as a substitute for professional medical care. Always follow your healthcare professional's instructions.

## 2017-10-15 NOTE — PROGRESS NOTES
SUBJECTIVE:   Ernie Leary is a 89 year old male who presents to the clinic today for a rash.  Onset of rash was 1 day ago.  Rash is gradual onset.  Location of the rash: LEFT knee, calf and ankle and back area and now has spread to RIGHT ankle.    Quality/symptoms of rash: itching, dry skin and redness   Symptoms are mild and rash seems to be worsening.  Previous history of a similar rash? No  Recent exposure history: none known.  Associated symptoms include: nothing. Reports no new soaps or lotions. Reports last new medication was Keppra for seizures, prescribed in 6/2017. Reports application of Vanicream OTC with some relief. Denies any new injuries to area.     Past Medical History:   Diagnosis Date     Abnormal glucose      Atrial flutter (H)     ablation 2008     Diabetes mellitus (H)     not currently being treated, diet controlled at this time     Essential hypertension, benign      Hemorrhagic stroke (H) 2012     Other and unspecified hyperlipidemia      Sebaceous cyst      Seizure disorder (H) 7/21/2017     Stroke (H)      Syncope and collapse 11/15/01     Current Outpatient Prescriptions   Medication Sig Dispense Refill     ipratropium (ATROVENT) 0.06 % spray Spray 2 sprays into both nostrils 4 times daily as needed for rhinitis 3 Box 3     amLODIPine (NORVASC) 2.5 MG tablet Take 1 tablet (2.5 mg) by mouth 2 times daily 180 tablet 3     metoprolol (LOPRESSOR) 50 MG tablet Take one-half tablet by mouth twice daily 90 tablet 3     fosinopril (MONOPRIL) 40 MG tablet take 1/2 tablet by mouth in the morning and 1 tab in the evening 135 tablet 3     levETIRAcetam (KEPPRA) 500 MG tablet Take 1 tablet (500 mg) by mouth 2 times daily 60 tablet 0     COENZYME Q-10 PO Take 100 mg by mouth daily       omega-3 acid ethyl esters (LOVAZA) 1 G capsule Take 1 g by mouth daily       vitamin B complex with vitamin C (VITAMIN  B COMPLEX) TABS tablet Take 1 tablet by mouth daily       Cholecalciferol (VITAMIN D) 2000 UNITS  tablet Take 2,000 Units by mouth daily 100 tablet 3     fluticasone (FLONASE) 50 MCG/ACT spray Spray 2 sprays into both nostrils daily 1 Bottle 1     CYANOCOBALAMIN PO Take 1,000 mcg by mouth daily.       psyllium (METAMUCIL) 58.6 % POWD Take 1 teaspoonful by mouth 3 times daily as needed. Constipation.       magnesium hydroxide (MILK OF MAGNESIA) 400 MG/5ML suspension Take 30 mLs by mouth daily as needed. Constipation.       calcium carbonate (TUMS) 500 MG chewable tablet Take 1 chew tab by mouth daily as needed.       prune juice LIQD Take  by mouth daily (with breakfast).       calcium 600 MG tablet Take 1 tablet by mouth daily  180 tablet 3     Social History   Substance Use Topics     Smoking status: Former Smoker     Packs/day: 2.00     Years: 30.00     Types: Cigarettes     Quit date: 1/1/1981     Smokeless tobacco: Never Used     Alcohol use No       EXAM:   /83  Pulse 56  Temp 97.9  F (36.6  C) (Oral)  Wt 173 lb 4 oz (78.6 kg)  BMI 27.13 kg/m2  GENERAL: alert, no acute distress.  SKIN: Rash description:    Distribution: localized  Location: RIGHT posterior knee, circumference of ankle and posterior calf. LEFT ankle and posterior back .   Color: red scaley,  Lesion type: macular, scattered areas with no other findings. No warmth or tenderness with palpation of calves or flexion.     ASSESSMENT: Dermatitis    PLAN:  1) Discussed with patient causes for dermatitis with dry weather.Encouraged Vanicream applications and begin topical triamcinolone as prescribed.   2) Encouraged close follow-up with primary care procider or Dermatologist if not improving; patient and wife agreed to the plan of care.     Ansley Alvarez, APRN, CNP

## 2017-10-15 NOTE — NURSING NOTE
"Chief Complaint   Patient presents with     Derm Problem     stinging rash on legs for several days.       Initial /83  Pulse 56  Temp 97.9  F (36.6  C) (Oral)  Wt 173 lb 4 oz (78.6 kg)  BMI 27.13 kg/m2 Estimated body mass index is 27.13 kg/(m^2) as calculated from the following:    Height as of 9/6/17: 5' 7\" (1.702 m).    Weight as of this encounter: 173 lb 4 oz (78.6 kg).  Medication Reconciliation: complete    "

## 2017-10-15 NOTE — MR AVS SNAPSHOT
After Visit Summary   10/15/2017    Ernie Leary    MRN: [id number]           Patient Information     Date Of Birth          9/23/1924        Visit Information        Provider Department      10/15/2017 5:30 PM Soha Alvarez APRN CNP [facility]        Today's Diagnoses     Dermatitis    -  1      Care Instructions      Self-Care for Skin Rashes     Pat your skin dry. Do not rub.     When your skin reacts to a substance your body is sensitive to, it can cause a rash. You can treat most rashes at home by keeping the skin clean and dry. Many rashes may get better on their own within 2 to 3 days. You may need medical attention if your rash itches, drains, or hurts, particularly if the rash is getting worse.  What can cause a skin rash?    Sun poisoning, caused by too much exposure to the sun    An irritant or allergic reaction to a certain type of food, plant, or chemical, such as  shellfish, poison ivy, and or cleaning products    An infection caused by a fungus (ringworm), virus (chickenpox), or bacteria (strep)    Bites or infestation caused by insects or pests, such as ticks, lice, or mites    Dry skin, which is often seen during the winter months and in older people  How can I control itching and skin damage?    Take soothing lukewarm baths in a colloidal oatmeal product. You can buy this at the [employer].    Do your best not to scratch. Clip fingernails short, especially in young children, to reduce skin damage if scratching does occur.    Use moisturizing skin lotion instead of scratching your dry skin.    Use sunscreen whenever going out into direct sun.    Use only mild cleansing agents whenever possible.    Wash with mild, nonirritating soap and warm water.    Wear clothing that breathes, such as cotton shirts or canvas shoes.    If fluid is seeping from the rash, cover it loosely with clean gauze to absorb the discharge.    Many rashes are contagious.  Prevent the rash from spreading to others by washing your hands often before or after touching others with any skin rash.  Use medicine    Antihistamines such as diphenhydramine can help control itching. But use with caution because they can make you drowsy.    Using over-the-counter hydrocortisone cream on small rashes may help reduce swelling and itching    Most over-the-counter antifungal medicines can treat athlete s foot and many other fungal infections of the skin.  Check with your healthcare provider  Call your healthcare provider if:    You were told that you have a fungal infection on your skin to make sure you have the correct type of medicine.    You have questions or concerns about medicines or their side effects.     Call 911  Call 911 if either of these occur:    Your tongue or lips start to swell    You have difficulty breathing      Call your healthcare provider  Call your healthcare provider if any of these occur:    Temperature of more than 101.0 F (38.3 C), or as directed    Sore throat, a cough, or unusual fatigue    Red, oozy, or painful rash gets worse. These are signs of infection.    Rash covers your face, genitals, or most of your body    Crusty sores or red rings that begin to spread    You were exposed to someone who has a contagious rash, such as scabies or lice.    Red bull s-eye rash with a white center (a sign of Lyme disease)    You were told that you have resistant bacteria (MRSA) on your skin.   Date Last Reviewed: 5/12/2015 2000-2017 [employer]. [address] [place] [address]. All rights reserved. This information is not intended as a substitute for professional medical care. Always follow your healthcare professional's instructions.        What is Atopic Dermatitis?  Atopic dermatitis (also called eczema) causes chronic skin irritation. It is often found in infants, teens, and adults. This disease often runs in families (is genetic). It may also be  linked to allergies, such as hay fever and sometimes asthma. Patches of skin become dry, red, itchy, and scaly. In older adults, abnormally dry skin is often called xerosis. Sometimes eczema is only on the hands or feet. It often improves when the skin is well hydrated. It gets worse when the skin is dry. You can help control symptoms by practicing good self-care. Avoid anything that causes flare-ups (such as sunburn or vigorous scratching).  Where do you have symptoms?  Atopic dermatitis symptoms can appear anywhere on the body. But in most cases they vary based on the person s age. In infants, irritation is often seen on the cheeks, chin, near the mouth, and under the eyelids. In children ages 2 through 10, skin folds, such as the backs of the knees, or in the arm crease, are most often affected. In children 11 and older and in adults, symptoms can affect many areas.  What triggers symptoms?  Symptoms flare because of many things. These include skin dryness, scratching, stress, harsh soaps, and irritants such as dust or wool. Try to avoid anything that causes flare-ups.  Recognizing what causes flare-ups  To figure out what causes atopic dermatitis to flare, keep a list of things that seem to affect your skin. Start by filling in the spaces below. Then keep writing them down in a notebook or diary. The things that affect each person vary. So keep your own list and try to avoid your triggers.    Date Last Reviewed: 2/1/2017 2000-2017 [employer]. [address] [place] [address], [place] [address]. All rights reserved. This information is not intended as a substitute for professional medical care. Always follow your healthcare professional's instructions.                Follow-ups after your visit        Follow-up notes from your care team     Return if symptoms worsen or fail to improve.      Who to contact     If you have questions or need follow up information about today's clinic visit or your schedule  please contact [facility] directly at [phone].  Normal or non-critical lab and imaging results will be communicated to you by MyChart, letter or phone within 4 business days after the clinic has received the results. If you do not hear from us within 7 days, please contact the clinic through [employer] or phone. If you have a critical or abnormal lab result, we will notify you by phone as soon as possible.  Submit refill requests through [employer] or call your pharmacy and they will forward the refill request to us. Please allow 3 business days for your refill to be completed.          Additional Information About Your Visit        [employer] Information     [employer] gives you secure access to your electronic health record. If you see a primary care provider, you can also send messages to your care team and make appointments. If you have questions, please call your primary care clinic.  If you do not have a primary care provider, please call [phone] and they will assist you.        Care EveryWhere ID     This is your Care EveryWhere ID. This could be used by other organizations to access your [facility] medical records  [id number]        Your Vitals Were     Pulse Temperature BMI (Body Mass Index)             56 97.9  F (36.6  C) (Oral) 27.13 kg/m2          Blood Pressure from Last 3 Encounters:   10/15/17 152/83   09/06/17 137/67   08/21/17 130/64    Weight from Last 3 Encounters:   10/15/17 173 lb 4 oz (78.6 kg)   09/06/17 170 lb (77.1 kg)   07/24/17 170 lb (77.1 kg)              Today, you had the following     No orders found for display         Today's Medication Changes          These changes are accurate as of: 10/15/17  6:50 PM.  If you have any questions, ask your nurse or doctor.               Start taking these medicines.        Dose/Directions    triamcinolone 0.1 % ointment   Commonly known as:  KENALOG   Used for:  Dermatitis   Started by:  Soha Alvarez APRN  CNP        Apply sparingly to affected area twice daily for 10-14 days.   Quantity:  160 g   Refills:  0            Where to get your medicines      [id number] [place], [place] - [address]  [address] [place] [address]     Phone:  [phone]     triamcinolone 0.1 % ointment                Primary Care Provider Office Phone # Fax #    Forest Ashraf [phone]       [facility] [address]  [place] [address]        Equal Access to Services     [facility]: Hadii aad ku hadasho Soomaali, waaxda luqadaha, qaybta kaalmada adeegyada, waxay idiin hayaan adeeg khalma samson . So [facility] [phone].    ATENCIÓN: Si habla español, tiene a crane disposición servicios gratuitos de asistencia lingüística. Llame al [phone].    We comply with applicable federal civil rights laws and [place] laws. We do not discriminate on the basis of race, color, national origin, age, disability, sex, sexual orientation, or gender identity.            Thank you!     Thank you for choosing [facility]  for your care. Our goal is always to provide you with excellent care. Hearing back from our patients is one way we can continue to improve our services. Please take a few minutes to complete the written survey that you may receive in the mail after your visit with us. Thank you!             Your Updated Medication List - Protect others around you: Learn how to safely use, store and throw away your medicines at www.disposemymeds.org.          This list is accurate as of: 10/15/17  6:50 PM.  Always use your most recent med list.                   Brand Name Dispense Instructions for use Diagnosis    amLODIPine 2.5 MG tablet    NORVASC    180 tablet    Take 1 tablet (2.5 mg) by mouth 2 times daily    Essential hypertension, benign       calcium 600 MG tablet     180 tablet    Take 1 tablet by mouth daily         calcium carbonate 500 MG chewable tablet    TUMS     Take 1 chew tab by mouth daily as needed.        COENZYME Q-10 PO      Take 100 mg by mouth daily        CYANOCOBALAMIN PO      Take 1,000 mcg by mouth daily.        fluticasone 50 MCG/ACT spray    FLONASE    1 Bottle    Spray 2 sprays into both nostrils daily    Chronic rhinitis       fosinopril 40 MG tablet    MONOPRIL    135 tablet    take 1/2 tablet by mouth in the morning and 1 tab in the evening    Essential hypertension, benign       ipratropium 0.06 % spray    ATROVENT    3 Box    Spray 2 sprays into both nostrils 4 times daily as needed for rhinitis    Chronic vasomotor rhinitis       levETIRAcetam 500 MG tablet    KEPPRA    60 tablet    Take 1 tablet (500 mg) by mouth 2 times daily        magnesium hydroxide 400 MG/5ML suspension    MILK OF MAGNESIA     Take 30 mLs by mouth daily as needed. Constipation.        metoprolol 50 MG tablet    LOPRESSOR    90 tablet    Take one-half tablet by mouth twice daily    Essential hypertension, benign       omega-3 acid ethyl esters 1 G capsule    Lovaza     Take 1 g by mouth daily        prune juice Liqd      Take  by mouth daily (with breakfast).        psyllium 58.6 % Powd    METAMUCIL     Take 1 teaspoonful by mouth 3 times daily as needed. Constipation.        triamcinolone 0.1 % ointment    KENALOG    160 g    Apply sparingly to affected area twice daily for 10-14 days.    Dermatitis       vitamin B complex with vitamin C Tabs tablet      Take 1 tablet by mouth daily        vitamin D 2000 UNITS tablet     100 tablet    Take 2,000 Units by mouth daily    Recurrent falls

## 2017-10-15 NOTE — TELEPHONE ENCOUNTER
Reason for Disposition    [1] Looks infected (spreading redness, pus) AND [2] large red area (> 2 in. or 5 cm)    Additional Information    Negative: [1] Sudden onset of rash (within last 2 hours) AND [2] difficulty with breathing or swallowing    Negative: Sounds like a life-threatening emergency to the triager    Negative: [1] Localized purple or blood-colored spots or dots AND [2] not from injury or friction AND [3] fever    Protocols used: RASH OR REDNESS - LOCALIZED-ADULT-AH

## 2018-06-26 PROCEDURE — 85027 COMPLETE CBC AUTOMATED: CPT | Performed by: INTERNAL MEDICINE

## 2018-06-26 PROCEDURE — 36415 COLL VENOUS BLD VENIPUNCTURE: CPT | Performed by: INTERNAL MEDICINE

## 2018-06-26 PROCEDURE — 83036 HEMOGLOBIN GLYCOSYLATED A1C: CPT | Performed by: INTERNAL MEDICINE

## 2018-06-26 PROCEDURE — 80061 LIPID PANEL: CPT | Performed by: INTERNAL MEDICINE

## 2018-06-26 PROCEDURE — 80053 COMPREHEN METABOLIC PANEL: CPT | Performed by: INTERNAL MEDICINE

## 2018-06-27 ENCOUNTER — DOCUMENTATION ONLY (OUTPATIENT)
Dept: LAB | Facility: CLINIC | Age: 83
End: 2018-06-27

## 2018-06-27 DIAGNOSIS — E78.5 HYPERLIPIDEMIA LDL GOAL <130: ICD-10-CM

## 2018-06-27 DIAGNOSIS — R73.01 IFG (IMPAIRED FASTING GLUCOSE): ICD-10-CM

## 2018-06-27 DIAGNOSIS — R73.01 IFG (IMPAIRED FASTING GLUCOSE): Primary | ICD-10-CM

## 2018-06-27 DIAGNOSIS — E78.5 HYPERLIPIDEMIA LDL GOAL <130: Primary | ICD-10-CM

## 2018-06-27 LAB
ALBUMIN SERPL-MCNC: 4 G/DL (ref 3.4–5)
ALP SERPL-CCNC: 67 U/L (ref 40–150)
ALT SERPL W P-5'-P-CCNC: 22 U/L (ref 0–70)
ANION GAP SERPL CALCULATED.3IONS-SCNC: 12 MMOL/L (ref 3–14)
AST SERPL W P-5'-P-CCNC: 16 U/L (ref 0–45)
BILIRUB SERPL-MCNC: 0.5 MG/DL (ref 0.2–1.3)
BUN SERPL-MCNC: 16 MG/DL (ref 7–30)
CALCIUM SERPL-MCNC: 9.5 MG/DL (ref 8.5–10.1)
CHLORIDE SERPL-SCNC: 103 MMOL/L (ref 94–109)
CHOLEST SERPL-MCNC: 170 MG/DL
CO2 SERPL-SCNC: 21 MMOL/L (ref 20–32)
CREAT SERPL-MCNC: 1.06 MG/DL (ref 0.66–1.25)
ERYTHROCYTE [DISTWIDTH] IN BLOOD BY AUTOMATED COUNT: 13.6 % (ref 10–15)
GFR SERPL CREATININE-BSD FRML MDRD: 65 ML/MIN/1.7M2
GLUCOSE SERPL-MCNC: 122 MG/DL (ref 70–99)
HBA1C MFR BLD: 6.3 % (ref 0–5.6)
HCT VFR BLD AUTO: 42.4 % (ref 40–53)
HDLC SERPL-MCNC: 40 MG/DL
HGB BLD-MCNC: 13.8 G/DL (ref 13.3–17.7)
LDLC SERPL CALC-MCNC: 114 MG/DL
MCH RBC QN AUTO: 30.2 PG (ref 26.5–33)
MCHC RBC AUTO-ENTMCNC: 32.5 G/DL (ref 31.5–36.5)
MCV RBC AUTO: 93 FL (ref 78–100)
NONHDLC SERPL-MCNC: 130 MG/DL
PLATELET # BLD AUTO: 247 10E9/L (ref 150–450)
POTASSIUM SERPL-SCNC: 4.1 MMOL/L (ref 3.4–5.3)
PROT SERPL-MCNC: 7.5 G/DL (ref 6.8–8.8)
RBC # BLD AUTO: 4.57 10E12/L (ref 4.4–5.9)
SODIUM SERPL-SCNC: 136 MMOL/L (ref 133–144)
TRIGL SERPL-MCNC: 82 MG/DL
WBC # BLD AUTO: 6.5 10E9/L (ref 4–11)

## 2018-06-27 NOTE — PROGRESS NOTES
Patient came in for Fasting lab appointment this morning, but no orders are in.  Lab doroteo '[place] tub[place]' for testing.  Please place orders for lab, if needed.  Thank you Lab,

## 2018-06-27 NOTE — LETTER
[facility]  [address] [facility]  [address]  [place]  [address]  Tel: [phone]    June 28, 2018    Ernie Leary  [address]   [facility] [address]        Dear Mr. Leary,    As we discussed in clinic, your lab results look stable, healthy and at your goal.      If you have any further questions or problems, please contact our office.      Sincerely,    Forest Ashraf MD/ Yesika Villegas CMA  Results for orders placed or performed in visit on 06/27/18   CBC with platelets   Result Value Ref Range    WBC 6.5 4.0 - 11.0 10e9/L    RBC Count 4.57 4.4 - 5.9 10e12/L    Hemoglobin 13.8 13.3 - 17.7 g/dL    Hematocrit 42.4 40.0 - 53.0 %    MCV 93 78 - 100 fl    MCH 30.2 26.5 - 33.0 pg    MCHC 32.5 31.5 - 36.5 g/dL    RDW 13.6 10.0 - 15.0 %    Platelet Count 247 150 - 450 10e9/L   Lipid panel reflex to direct LDL Fasting   Result Value Ref Range    Cholesterol 170 <200 mg/dL    Triglycerides 82 <150 mg/dL    HDL Cholesterol 40 >39 mg/dL    LDL Cholesterol Calculated 114 (H) <100 mg/dL    Non HDL Cholesterol 130 (H) <130 mg/dL   Comprehensive metabolic panel (BMP + Alb, Alk Phos, ALT, AST, Total. Bili, TP)   Result Value Ref Range    Sodium 136 133 - 144 mmol/L    Potassium 4.1 3.4 - 5.3 mmol/L    Chloride 103 94 - 109 mmol/L    Carbon Dioxide 21 20 - 32 mmol/L    Anion Gap 12 3 - 14 mmol/L    Glucose 122 (H) 70 - 99 mg/dL    Urea Nitrogen 16 7 - 30 mg/dL    Creatinine 1.06 0.66 - 1.25 mg/dL    GFR Estimate 65 >60 mL/min/1.7m2    GFR Estimate If Black 79 >60 mL/min/1.7m2    Calcium 9.5 8.5 - 10.1 mg/dL    Bilirubin Total 0.5 0.2 - 1.3 mg/dL    Albumin 4.0 3.4 - 5.0 g/dL    Protein Total 7.5 6.8 - 8.8 g/dL    Alkaline Phosphatase 67 40 - 150 U/L    ALT 22 0 - 70 U/L    AST 16 0 - 45 U/L   Hemoglobin A1c   Result Value Ref Range    Hemoglobin A1C 6.3 (H) 0 - 5.6 %               Enclosure: Lab Results

## 2018-06-28 ENCOUNTER — OFFICE VISIT (OUTPATIENT)
Dept: FAMILY MEDICINE | Facility: CLINIC | Age: 83
End: 2018-06-28
Payer: COMMERCIAL

## 2018-06-28 VITALS
HEIGHT: 67 IN | SYSTOLIC BLOOD PRESSURE: 142 MMHG | HEART RATE: 66 BPM | DIASTOLIC BLOOD PRESSURE: 75 MMHG | WEIGHT: 175 LBS | BODY MASS INDEX: 27.47 KG/M2 | OXYGEN SATURATION: 95 % | TEMPERATURE: 96.9 F

## 2018-06-28 DIAGNOSIS — N40.0 BENIGN PROSTATIC HYPERPLASIA WITHOUT LOWER URINARY TRACT SYMPTOMS: ICD-10-CM

## 2018-06-28 DIAGNOSIS — I68.0 CEREBRAL AMYLOID ANGIOPATHY (H): ICD-10-CM

## 2018-06-28 DIAGNOSIS — E78.5 HYPERLIPIDEMIA LDL GOAL <130: ICD-10-CM

## 2018-06-28 DIAGNOSIS — R73.01 IFG (IMPAIRED FASTING GLUCOSE): ICD-10-CM

## 2018-06-28 DIAGNOSIS — E85.4 CEREBRAL AMYLOID ANGIOPATHY (H): ICD-10-CM

## 2018-06-28 DIAGNOSIS — G40.909 SEIZURE DISORDER (H): Primary | ICD-10-CM

## 2018-06-28 DIAGNOSIS — Z86.79 HISTORY OF INTRACRANIAL HEMORRHAGE: ICD-10-CM

## 2018-06-28 DIAGNOSIS — I48.91 ATRIAL FIBRILLATION, UNSPECIFIED TYPE (H): ICD-10-CM

## 2018-06-28 DIAGNOSIS — I10 ESSENTIAL HYPERTENSION, BENIGN: ICD-10-CM

## 2018-06-28 DIAGNOSIS — I10 ESSENTIAL HYPERTENSION: ICD-10-CM

## 2018-06-28 PROCEDURE — 99214 OFFICE O/P EST MOD 30 MIN: CPT | Performed by: INTERNAL MEDICINE

## 2018-06-28 RX ORDER — METOPROLOL TARTRATE 50 MG
TABLET ORAL
Qty: 90 TABLET | Refills: 3 | Status: SHIPPED | OUTPATIENT
Start: 2018-06-28 | End: 2019-09-03

## 2018-06-28 RX ORDER — FOSINOPRIL SODIUM 40 MG/1
TABLET ORAL
Qty: 135 TABLET | Refills: 3 | Status: SHIPPED | OUTPATIENT
Start: 2018-06-28 | End: 2019-06-15

## 2018-06-28 RX ORDER — AMLODIPINE BESYLATE 2.5 MG/1
2.5 TABLET ORAL 2 TIMES DAILY
Qty: 180 TABLET | Refills: 3 | Status: SHIPPED | OUTPATIENT
Start: 2018-06-28 | End: 2019-09-03

## 2018-06-28 NOTE — PROGRESS NOTES
The following letter pertains to your most recent diagnostic tests:      As we discussed in clinic, your lab results look stable, healthy and at your goal.        Sincerely,    Dr. Ashraf

## 2018-06-28 NOTE — PROGRESS NOTES
SUBJECTIVE:   Ernie Leary is a 89 year old male who presents to clinic today for the following health issues:      Hypertension Follow-up      Outpatient blood pressures are not being checked.    Low Salt Diet: low salt      Follow-up IFG (impaired fasting glucose)      He feels well and is without specific complaints  He sees Dr. Tucker regularly for skin checks  He walks more than 53653 steps daily  No urinary symptoms  No dyspnea or chest pains  Rhinitis symptoms did not get much better with atrovent  He denies fevers or productive cough     Problem list and histories reviewed & adjusted, as indicated.  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)     Transient cerebral ischemia     Cerebral amyloid angiopathy (H)     History of intracranial hemorrhage     TIA (transient ischemic attack)     Seizure disorder (H)     Past Surgical History:   Procedure Laterality Date     ------------OTHER-------------  1/1/2008    Cardiac ablation     ADJACENT TISSUE TRANSFER, FOREHEAD/CHEEKS/CHIN/MOUTH/NECK/AXILLAE/GENITALIA/HANDS/FEET, < OR = 10CM       ARTHROTOMY SHOULDER, ROTATOR CUFF REPAIR, COMBINED  11/18/2011    Procedure:COMBINED ARTHROTOMY SHOULDER, ROTATOR CUFF REPAIR; LEFT SHOULDER OPEN ROTATOR CUFF REPAIR, BURSECTOMY, DISTAL CLAVICLE RESECTION; Surgeon:KHALIDA ZABALA; Location:[facility]     BIOPSY      multiple skin biopsies for skin cancers     CARDIAC SURGERY       CYSTOSCOPY, TRANSURETHRAL RESECTION (TUR) PROSTATE, COMBINED  8/15/2012     Procedure: COMBINED CYSTOSCOPY, TRANSURETHRAL RESECTION (TUR) PROSTATE;  CYSTOSCOPY, TRANSURETHRAL RESECTION  OF  PROSTATE WITH PLASMA LOOP;  Surgeon: Harmeet Kilgore MD;  Location:  OR     HERNIORRHAPHY INGUINAL Right 5/29/2015    Procedure: HERNIORRHAPHY INGUINAL;  Surgeon: Scottie Nunn MD;  Location:  [facility]     PHACOEMULSIFICATION CLEAR CORNEA WITH TORIC INTRAOCULAR LENS IMPLANT Right 7/24/2017    Procedure: PHACOEMULSIFICATION CLEAR CORNEA WITH TORIC INTRAOCULAR LENS IMPLANT;  RIGHT PHACOEMULSIFICATION CLEAR CORNEA WITH TORIC INTRAOCULAR LENS IMPLANT ;  Surgeon: Yoel Calero MD;  Location:  [facility]     PHACOEMULSIFICATION CLEAR CORNEA WITH TORIC INTRAOCULAR LENS IMPLANT Left 8/21/2017    Procedure: PHACOEMULSIFICATION CLEAR CORNEA WITH TORIC INTRAOCULAR LENS IMPLANT;  LEFT PHACOEMULSIFICATION CLEAR CORNEA WITH TORIC INTRAOCULAR LENS IMPLANT ;  Surgeon: Yoel Calero MD;  Location: [facility]       Social History   Substance Use Topics     Smoking status: Former Smoker     Packs/day: 2.00     Years: 30.00     Types: Cigarettes     Quit date: 1/1/1981     Smokeless tobacco: Never Used     Alcohol use No     No family history on file.      Current Outpatient Prescriptions   Medication Sig Dispense Refill     amLODIPine (NORVASC) 2.5 MG tablet Take 1 tablet (2.5 mg) by mouth 2 times daily 180 tablet 3     calcium 600 MG tablet Take 1 tablet by mouth every other day  180 tablet 3     calcium carbonate (TUMS) 500 MG chewable tablet Take 1 chew tab by mouth daily as needed.       Cholecalciferol (VITAMIN D) 2000 UNITS tablet Take 2,000 Units by mouth daily (Patient taking differently: Take 2,000 Units by mouth every other day ) 100 tablet 3     COENZYME Q-10 PO Take 100 mg by mouth every 14 days        CYANOCOBALAMIN PO Take 1,000 mcg by mouth daily.       fosinopril (MONOPRIL) 40 MG tablet take 1/2 tablet by mouth in the morning and 1 tab in the evening 135 tablet 3     levETIRAcetam (KEPPRA) 500 MG tablet  "Take 1 tablet (500 mg) by mouth 2 times daily 60 tablet 0     metoprolol (LOPRESSOR) 50 MG tablet Take one-half tablet by mouth twice daily 90 tablet 3     prune juice LIQD Take  by mouth daily (with breakfast).       psyllium (METAMUCIL) 58.6 % POWD Take 1 teaspoonful by mouth 3 times daily as needed. Constipation.       triamcinolone (KENALOG) 0.1 % ointment Apply sparingly to affected area twice daily for 10-14 days. 160 g 0     vitamin B complex with vitamin C (VITAMIN  B COMPLEX) TABS tablet Take 1 tablet by mouth daily       Allergies   Allergen Reactions     Adrenalin [Epinephrine Hcl]      Hctz      hyponatremia       Reviewed and updated as needed this visit by clinical staff       Reviewed and updated as needed this visit by Provider         ROS:  Constitutional, HEENT, cardiovascular, pulmonary, gi and gu systems are negative, except as otherwise noted.    OBJECTIVE:     /75 (BP Location: Right arm, Cuff Size: Adult Regular)  Pulse 66  Temp 96.9  F (36.1  C) (Oral)  Ht 5' 7\" (1.702 m)  Wt 175 lb (79.4 kg)  SpO2 95%  BMI 27.41 kg/m2  Body mass index is 27.41 kg/(m^2).  GENERAL: healthy, alert and no distress  EYES: Eyes grossly normal to inspection, PERRL and conjunctivae and sclerae normal  HENT: ear canals and TM's normal, nose and mouth without ulcers or lesions  NECK: no adenopathy, no asymmetry, masses, or scars and thyroid normal to palpation  RESP: lungs clear to auscultation - no rales, rhonchi or wheezes  CV: regular rate and rhythm, normal S1 S2, no S3 or S4, no murmur, click or rub, no peripheral edema and peripheral pulses strong  ABDOMEN: soft, nontender, no hepatosplenomegaly, no masses and bowel sounds normal  MS: no gross musculoskeletal defects noted, no edema  SKIN: no suspicious lesions or rashes  NEURO: Normal strength and tone, mentation intact and speech normal  PSYCH: mentation appears normal, affect normal/bright    Diagnostic Test Results:  Results for orders placed or " performed in visit on 06/27/18   CBC with platelets   Result Value Ref Range    WBC 6.5 4.0 - 11.0 10e9/L    RBC Count 4.57 4.4 - 5.9 10e12/L    Hemoglobin 13.8 13.3 - 17.7 g/dL    Hematocrit 42.4 40.0 - 53.0 %    MCV 93 78 - 100 fl    MCH 30.2 26.5 - 33.0 pg    MCHC 32.5 31.5 - 36.5 g/dL    RDW 13.6 10.0 - 15.0 %    Platelet Count 247 150 - 450 10e9/L   Lipid panel reflex to direct LDL Fasting   Result Value Ref Range    Cholesterol 170 <200 mg/dL    Triglycerides 82 <150 mg/dL    HDL Cholesterol 40 >39 mg/dL    LDL Cholesterol Calculated 114 (H) <100 mg/dL    Non HDL Cholesterol 130 (H) <130 mg/dL   Comprehensive metabolic panel (BMP + Alb, Alk Phos, ALT, AST, Total. Bili, TP)   Result Value Ref Range    Sodium 136 133 - 144 mmol/L    Potassium 4.1 3.4 - 5.3 mmol/L    Chloride 103 94 - 109 mmol/L    Carbon Dioxide 21 20 - 32 mmol/L    Anion Gap 12 3 - 14 mmol/L    Glucose 122 (H) 70 - 99 mg/dL    Urea Nitrogen 16 7 - 30 mg/dL    Creatinine 1.06 0.66 - 1.25 mg/dL    GFR Estimate 65 >60 mL/min/1.7m2    GFR Estimate If Black 79 >60 mL/min/1.7m2    Calcium 9.5 8.5 - 10.1 mg/dL    Bilirubin Total 0.5 0.2 - 1.3 mg/dL    Albumin 4.0 3.4 - 5.0 g/dL    Protein Total 7.5 6.8 - 8.8 g/dL    Alkaline Phosphatase 67 40 - 150 U/L    ALT 22 0 - 70 U/L    AST 16 0 - 45 U/L   Hemoglobin A1c   Result Value Ref Range    Hemoglobin A1C 6.3 (H) 0 - 5.6 %       ASSESSMENT/PLAN:         1. Seizure disorder (H)  Stable, continue follow up with Dr. Alcantar    2. Cerebral amyloid angiopathy (H)  Stable, continue follow up with Dr. Alcantar    3. Atrial fibrillation, unspecified type (H)  Normal rhythm on exam today, not a candidate for anticoagulation due to previous hemorrhage     4. Hyperlipidemia LDL goal <130  Lipids are OK    5. Essential hypertension  Blood pressure OK given his age  Medications refilled    6. IFG (impaired fasting glucose)  Glycemic control is OK     7. Benign prostatic hyperplasia without lower urinary tract  symptoms  No urinary symptoms     8. History of intracranial hemorrhage        FUTURE APPOINTMENTS:       - Follow-up visit in he would like to come back for a 6 month check    He was reminded to get his second shingrix shot     Forest Ashraf MD  [facility]

## 2018-06-28 NOTE — MR AVS SNAPSHOT
After Visit Summary   6/28/2018    Ernie Leary    MRN: [id number]           Patient Information     Date Of Birth          9/23/1924        Visit Information        Provider Department      6/28/2018 9:30 AM Forest Ashraf MD [facility]        Today's Diagnoses     Seizure disorder (H)    -  1    Cerebral amyloid angiopathy (H)        Atrial fibrillation, unspecified type (H)        Hyperlipidemia LDL goal <130        Essential hypertension        IFG (impaired fasting glucose)        Benign prostatic hyperplasia without lower urinary tract symptoms        History of intracranial hemorrhage          Care Instructions    Remember to get your second Shingrix vaccine 2-6 months after your first shot.            Follow-ups after your visit        Follow-up notes from your care team     Return in about 6 months (around 12/28/2018) for Routine Visit.      Who to contact     If you have questions or need follow up information about today's clinic visit or your schedule please contact [facility] directly at [phone].  Normal or non-critical lab and imaging results will be communicated to you by Illumitexhart, letter or phone within 4 business days after the clinic has received the results. If you do not hear from us within 7 days, please contact the clinic through Illumitexhart or phone. If you have a critical or abnormal lab result, we will notify you by phone as soon as possible.  Submit refill requests through [employer] or call your pharmacy and they will forward the refill request to us. Please allow 3 business days for your refill to be completed.          Additional Information About Your Visit        Illum[employer]xhart Information     Promuc gives you secure access to your electronic health record. If you see a primary care provider, you can also send messages to your care team and make appointments. If you have questions, please call your primary care clinic.  If you do not have a primary care  "provider, please call [phone] and they will assist you.        Care EveryWhere ID     This is your Care EveryWhere ID. This could be used by other organizations to access your [facility] medical records  [id number]        Your Vitals Were     Pulse Temperature Height Pulse Oximetry BMI (Body Mass Index)       66 96.9  F (36.1  C) (Oral) 5' 7\" (1.702 m) 95% 27.41 kg/m2        Blood Pressure from Last 3 Encounters:   06/28/18 142/75   10/15/17 152/83   09/06/17 137/67    Weight from Last 3 Encounters:   06/28/18 175 lb (79.4 kg)   10/15/17 173 lb 4 oz (78.6 kg)   09/06/17 170 lb (77.1 kg)              Today, you had the following     No orders found for display         Today's Medication Changes          These changes are accurate as of 6/28/18  9:58 AM.  If you have any questions, ask your nurse or doctor.               These medicines have changed or have updated prescriptions.        Dose/Directions    vitamin D 2000 units tablet   This may have changed:  when to take this   Used for:  Recurrent falls        Dose:  2000 Units   Take 2,000 Units by mouth daily   Quantity:  100 tablet   Refills:  3                Primary Care Provider Office Phone # Fax #    Forest Ashraf [phone] [address] [address]        Equal Access to Services     [facility] Medicine[facility]ANWAL AH: Hadii alejandro Lovell, waaxda luqadaha, qaybta kaaljanice brown . So [facility] [phone].    ATENCIÓN: Si habla español, tiene a crane disposición servicios gratuitos de asistencia lingüística. Marguerite al [phone].    We comply with applicable federal civil rights laws and [place] laws. We do not discriminate on the basis of race, color, national origin, age, disability, sex, sexual orientation, or gender identity.            Thank you!     Thank you for choosing [facility]  for your care. Our goal is always to provide you with excellent care. Hearing " back from our patients is one way we can continue to improve our services. Please take a few minutes to complete the written survey that you may receive in the mail after your visit with us. Thank you!             Your Updated Medication List - Protect others around you: Learn how to safely use, store and throw away your medicines at www.disposemymeds.org.          This list is accurate as of 6/28/18  9:58 AM.  Always use your most recent med list.                   Brand Name Dispense Instructions for use Diagnosis    amLODIPine 2.5 MG tablet    NORVASC    180 tablet    Take 1 tablet (2.5 mg) by mouth 2 times daily    Essential hypertension, benign       calcium 600 MG tablet     180 tablet    Take 1 tablet by mouth every other day        calcium carbonate 500 MG chewable tablet    TUMS     Take 1 chew tab by mouth daily as needed.        COENZYME Q-10 PO      Take 100 mg by mouth every 14 days        CYANOCOBALAMIN PO      Take 1,000 mcg by mouth daily.        fosinopril 40 MG tablet    MONOPRIL    135 tablet    take 1/2 tablet by mouth in the morning and 1 tab in the evening    Essential hypertension, benign       levETIRAcetam 500 MG tablet    KEPPRA    60 tablet    Take 1 tablet (500 mg) by mouth 2 times daily    Seizure disorder (H)       metoprolol tartrate 50 MG tablet    LOPRESSOR    90 tablet    Take one-half tablet by mouth twice daily    Essential hypertension, benign       prune juice Liqd      Take  by mouth daily (with breakfast).        psyllium 58.6 % Powd    METAMUCIL     Take 1 teaspoonful by mouth 3 times daily as needed. Constipation.        triamcinolone 0.1 % ointment    KENALOG    160 g    Apply sparingly to affected area twice daily for 10-14 days.    Dermatitis       vitamin B complex with vitamin C Tabs tablet      Take 1 tablet by mouth daily        vitamin D 2000 units tablet     100 tablet    Take 2,000 Units by mouth daily    Recurrent falls

## 2018-09-06 ENCOUNTER — OFFICE VISIT (OUTPATIENT)
Dept: PODIATRY | Facility: CLINIC | Age: 83
End: 2018-09-06
Payer: COMMERCIAL

## 2018-09-06 VITALS
HEIGHT: 67 IN | SYSTOLIC BLOOD PRESSURE: 140 MMHG | DIASTOLIC BLOOD PRESSURE: 80 MMHG | BODY MASS INDEX: 27.15 KG/M2 | WEIGHT: 173 LBS

## 2018-09-06 DIAGNOSIS — L60.3 DYSTROPHIC NAIL: ICD-10-CM

## 2018-09-06 DIAGNOSIS — B35.1 ONYCHOMYCOSIS: Primary | ICD-10-CM

## 2018-09-06 DIAGNOSIS — M21.612 BILATERAL BUNIONS: ICD-10-CM

## 2018-09-06 DIAGNOSIS — M21.611 BILATERAL BUNIONS: ICD-10-CM

## 2018-09-06 DIAGNOSIS — M20.42 ACQUIRED BILATERAL HAMMER TOES: ICD-10-CM

## 2018-09-06 DIAGNOSIS — M20.41 ACQUIRED BILATERAL HAMMER TOES: ICD-10-CM

## 2018-09-06 PROCEDURE — 99203 OFFICE O/P NEW LOW 30 MIN: CPT | Performed by: PODIATRIST

## 2018-09-06 NOTE — PATIENT INSTRUCTIONS
Lactic Acid or Urea Lotion    Thank you for choosing [facility]!    DR. ALLEN'S CLINIC LOCATIONS:   MONDAY - [place] TUESDAY - [place]   [address] [facility]  [address]   [place] [address] [place] [address]   [phone]       THURSDAY AM - [place] THURSDAY PM - [place]   [address]   [place] [address] [place], [place] [address] [phone]       FRIDAY AM - [place] SET UP SURGERY: [phone] [address] APPOINTMENTS: [phone]   [place] [address] BILLING QUESTIONS: [phone] FAX NUMBER: [phone]     Follow Up:  As needed    FOOT CARE NURSES  If you are interested in having a foot care nurse come out to your   home, please call one of these contacts for more information:  [facility]  [phone] [facility]  [phone]   [facility]  [phone]  [place] [phone]  [place]   [facility]  [phone]  At [place] [facility] [phone]       TOE SPACERS        Body Mass Index (BMI)  Many things can cause foot and ankle problems. Foot structure, activity level, foot mechanics and injuries are common causes of pain. One very important issue that often goes unmentioned, is body weight. Extra weight can cause increased stress on muscles, ligaments, bones and tendons. Sometimes just a few extra pounds is all it takes to put one over her/his threshold. Without reducing that stress, it can be difficult to alleviate pain. Some people are uncomfortable addressing this issue, but we feel it is important for you to think about it. As Foot &  Ankle specialists, our job is addressing the lower extremity problem and possible causes. Regarding extra body weight, we encourage patients to discuss diet and weight management plans with their primary care doctors. It is this team approach that  gives you the best opportunity for pain relief and getting you back on your feet.

## 2018-09-06 NOTE — PROGRESS NOTES
"Foot & Ankle Surgery  September 6, 2018    CC: \"nail problems\"    I was asked to see Ernie Leary regarding the chief complaint by:  self    HPI:  Pt is a 89 year old male who presents with above complaint.  \"My toes are strange\", \"I've been working on them\" regarding his nails.  He states he was having issues with buion/hammertoes and thick nails but he's been doing well as of late.  He lives in a care facility that has routine nail care but has not had them addressed in a bit, states he's taking care of them himself.    ROS:   Pos for CC.  The patient denies current nausea, vomiting, chills, fevers, belly pain, calf pain, chest pain or SOB.  Complete remainder of ROS is otherwise neg.    VITALS:    Vitals:    09/06/18 0934   BP: 140/80   Weight: 173 lb (78.5 kg)   Height: 5' 7\" (1.702 m)       PMH:    Past Medical History:   Diagnosis Date     Abnormal glucose      Atrial flutter (H)     ablation 2008     Diabetes mellitus (H)     not currently being treated, diet controlled at this time     Essential hypertension, benign      Hemorrhagic stroke (H) 2012     Other and unspecified hyperlipidemia      Sebaceous cyst      Seizure disorder (H) 7/21/2017     Stroke (H)      Syncope and collapse 11/15/01       SXHX:    Past Surgical History:   Procedure Laterality Date     ------------OTHER-------------  1/1/2008    Cardiac ablation     ADJACENT TISSUE TRANSFER, FOREHEAD/CHEEKS/CHIN/MOUTH/NECK/AXILLAE/GENITALIA/HANDS/FEET, < OR = 10CM       ARTHROTOMY SHOULDER, ROTATOR CUFF REPAIR, COMBINED  11/18/2011    Procedure:COMBINED ARTHROTOMY SHOULDER, ROTATOR CUFF REPAIR; LEFT SHOULDER OPEN ROTATOR CUFF REPAIR, BURSECTOMY, DISTAL CLAVICLE RESECTION; Surgeon:KHALIDA ZABALA; Location:[facility]     BIOPSY      multiple skin biopsies for skin cancers     CARDIAC SURGERY       CYSTOSCOPY, TRANSURETHRAL RESECTION (TUR) PROSTATE, COMBINED  8/15/2012    Procedure: COMBINED CYSTOSCOPY, TRANSURETHRAL RESECTION (TUR) PROSTATE;  " CYSTOSCOPY, TRANSURETHRAL RESECTION  OF  PROSTATE WITH PLASMA LOOP;  Surgeon: Harmeet Kilgore MD;  Location:  OR     HERNIORRHAPHY INGUINAL Right 5/29/2015    Procedure: HERNIORRHAPHY INGUINAL;  Surgeon: Scottie Nunn MD;  Location:  [facility]     PHACOEMULSIFICATION CLEAR CORNEA WITH TORIC INTRAOCULAR LENS IMPLANT Right 7/24/2017    Procedure: PHACOEMULSIFICATION CLEAR CORNEA WITH TORIC INTRAOCULAR LENS IMPLANT;  RIGHT PHACOEMULSIFICATION CLEAR CORNEA WITH TORIC INTRAOCULAR LENS IMPLANT ;  Surgeon: Yoel Calero MD;  Location:  [facility]     PHACOEMULSIFICATION CLEAR CORNEA WITH TORIC INTRAOCULAR LENS IMPLANT Left 8/21/2017    Procedure: PHACOEMULSIFICATION CLEAR CORNEA WITH TORIC INTRAOCULAR LENS IMPLANT;  LEFT PHACOEMULSIFICATION CLEAR CORNEA WITH TORIC INTRAOCULAR LENS IMPLANT ;  Surgeon: Yoel Calero MD;  Location:  [facility]        MEDS:    Current Outpatient Prescriptions   Medication     amLODIPine (NORVASC) 2.5 MG tablet     calcium 600 MG tablet     calcium carbonate (TUMS) 500 MG chewable tablet     Cholecalciferol (VITAMIN D) 2000 UNITS tablet     COENZYME Q-10 PO     CYANOCOBALAMIN PO     fosinopril (MONOPRIL) 40 MG tablet     levETIRAcetam (KEPPRA) 500 MG tablet     metoprolol tartrate (LOPRESSOR) 50 MG tablet     prune juice LIQD     psyllium (METAMUCIL) 58.6 % POWD     triamcinolone (KENALOG) 0.1 % ointment     vitamin B complex with vitamin C (VITAMIN  B COMPLEX) TABS tablet     No current facility-administered medications for this visit.        ALL:     Allergies   Allergen Reactions     Adrenalin [Epinephrine Hcl]      Hctz      hyponatremia       FMH:  No family history on file.    SocHx:    Social History     Social History     Marital status:      Spouse name: nicholas freeman     Number of children: 3     Years of education: N/A     Occupational History     Not on file.     Social History Main Topics     Smoking status: Former Smoker     Packs/day: 2.00     Years: 30.00     Types: Cigarettes      Quit date: 1/1/1981     Smokeless tobacco: Never Used     Alcohol use No     Drug use: No     Sexual activity: Not Currently     Partners: Female     Other Topics Concern     Hobby Hazards Yes     yard work     Stress Concern No     Weight Concern No     Special Diet Yes     Exercise No     Social History Narrative     to Alexsandra Carey           EXAMINATION:  Gen:   No apparent distress  Neuro:   A&Ox3, no deficits  Psych:    Answering questions appropriately for age and situation with normal affect  Head:    NCAT  Eye:    Visual scanning without deficit  Ear:    Response to auditory stimuli wnl  Lung:    Non-labored breathing on RA noted  Abd:    NTND per patient report  Lymph:    Neg for pitting/non-pitting edema BLE  Vasc:    Pulses palpable, CFT minimally delayed  Neuro:    Light touch sensation intact to all sensory nerve distributions without paresthesias  Derm:    Nails are dystrophic, multiple are mycotic.  It looks like he removed most of the R hallux nail wihtout nail bed wound.  Chronic tinea pedis  MSK:    Bilateral bunion/hammertoes  Calf:    Neg for redness, swelling or tenderness    Assessment:  89 year old male with dystrophic, mycotic nails bilateral; bilateral bunion/hammertoes      Plan:  Discussed etiologies, anatomy and options  1.  Dystrophic, mycotic nails  -nail care handout dispesned for routine nail care options for future debridement  -discussed nail fungus treatment, including nails, skin, shower, shoes, etc. Declined treatment at this time    2.  Bilateral bunion/hammertoes  -comfortable accommodative shoe gear  -toe cap/spacer handout dispensed for interdigital pressure relief  -his wife asked if there are any exercises to address the toes.  We discussed that surgery is the only way to correct the alignment, but I would not advise surgery as he's having minimal pain     Follow up:  prn or sooner with acute issues      Patient's medical history was reviewed today    Body mass  index is 27.1 kg/(m^2).          Nikhil Kruger DPM FACFAS FACFAOM  Podiatric Foot & Ankle Surgeon  [facility]  [phone]

## 2018-09-06 NOTE — PROGRESS NOTES
"Foot & Ankle Surgery  September 6, 2018    CC: ***    I was asked to see Ernie Leary regarding the chief complaint by:  ***    HPI:  Pt is a 89 year old male who presents with above complaint.  ***    ROS:   Pos for CC.  The patient denies current nausea, vomiting, chills, fevers, belly pain, calf pain, chest pain or SOB.  Complete remainder of ROS is otherwise neg.    VITALS:    Vitals:    09/06/18 0934   BP: 140/80   Weight: 173 lb (78.5 kg)   Height: 5' 7\" (1.702 m)       PMH:    Past Medical History:   Diagnosis Date     Abnormal glucose      Atrial flutter (H)     ablation 2008     Diabetes mellitus (H)     not currently being treated, diet controlled at this time     Essential hypertension, benign      Hemorrhagic stroke (H) 2012     Other and unspecified hyperlipidemia      Sebaceous cyst      Seizure disorder (H) 7/21/2017     Stroke (H)      Syncope and collapse 11/15/01       SXHX:    Past Surgical History:   Procedure Laterality Date     ------------OTHER-------------  1/1/2008    Cardiac ablation     ADJACENT TISSUE TRANSFER, FOREHEAD/CHEEKS/CHIN/MOUTH/NECK/AXILLAE/GENITALIA/HANDS/FEET, < OR = 10CM       ARTHROTOMY SHOULDER, ROTATOR CUFF REPAIR, COMBINED  11/18/2011    Procedure:COMBINED ARTHROTOMY SHOULDER, ROTATOR CUFF REPAIR; LEFT SHOULDER OPEN ROTATOR CUFF REPAIR, BURSECTOMY, DISTAL CLAVICLE RESECTION; Surgeon:KHALIDA ZABALA; Location: OR     BIOPSY      multiple skin biopsies for skin cancers     CARDIAC SURGERY       CYSTOSCOPY, TRANSURETHRAL RESECTION (TUR) PROSTATE, COMBINED  8/15/2012    Procedure: COMBINED CYSTOSCOPY, TRANSURETHRAL RESECTION (TUR) PROSTATE;  CYSTOSCOPY, TRANSURETHRAL RESECTION  OF  PROSTATE WITH PLASMA LOOP;  Surgeon: Harmeet Kilgore MD;  Location:  OR     HERNIORRHAPHY INGUINAL Right 5/29/2015    Procedure: HERNIORRHAPHY INGUINAL;  Surgeon: Scottie Nunn MD;  Location:  SD     PHACOEMULSIFICATION CLEAR CORNEA WITH TORIC INTRAOCULAR LENS IMPLANT Right 7/24/2017 "    Procedure: PHACOEMULSIFICATION CLEAR CORNEA WITH TORIC INTRAOCULAR LENS IMPLANT;  RIGHT PHACOEMULSIFICATION CLEAR CORNEA WITH TORIC INTRAOCULAR LENS IMPLANT ;  Surgeon: Yoel Calero MD;  Location: [facility]     PHACOEMULSIFICATION CLEAR CORNEA WITH TORIC INTRAOCULAR LENS IMPLANT Left 8/21/2017    Procedure: PHACOEMULSIFICATION CLEAR CORNEA WITH TORIC INTRAOCULAR LENS IMPLANT;  LEFT PHACOEMULSIFICATION CLEAR CORNEA WITH TORIC INTRAOCULAR LENS IMPLANT ;  Surgeon: Yoel Calero MD;  Location: [facility]        MEDS:    Current Outpatient Prescriptions   Medication     amLODIPine (NORVASC) 2.5 MG tablet     calcium 600 MG tablet     calcium carbonate (TUMS) 500 MG chewable tablet     Cholecalciferol (VITAMIN D) 2000 UNITS tablet     COENZYME Q-10 PO     CYANOCOBALAMIN PO     fosinopril (MONOPRIL) 40 MG tablet     levETIRAcetam (KEPPRA) 500 MG tablet     metoprolol tartrate (LOPRESSOR) 50 MG tablet     prune juice LIQD     psyllium (METAMUCIL) 58.6 % POWD     triamcinolone (KENALOG) 0.1 % ointment     vitamin B complex with vitamin C (VITAMIN  B COMPLEX) TABS tablet     No current facility-administered medications for this visit.        ALL:     Allergies   Allergen Reactions     Adrenalin [Epinephrine Hcl]      Hctz      hyponatremia       FMH:  No family history on file.    SocHx:    Social History     Social History     Marital status:      Spouse name: nicholas freeman     Number of children: 3     Years of education: N/A     Occupational History     Not on file.     Social History Main Topics     Smoking status: Former Smoker     Packs/day: 2.00     Years: 30.00     Types: Cigarettes     Quit date: 1/1/1981     Smokeless tobacco: Never Used     Alcohol use No     Drug use: No     Sexual activity: Not Currently     Partners: Female     Other Topics Concern     Hobby Hazards Yes     yard work     Stress Concern No     Weight Concern No     Special Diet Yes     Exercise No     Social History Narrative      leonor Carey           EXAMINATION:  Gen:   No apparent distress  Neuro:   A&Ox3, no deficits  Psych:    Answering questions appropriately for age and situation with normal affect  Head:    NCAT  Eye:    Visual scanning without deficit  Ear:    Response to auditory stimuli wnl  Lung:    Non-labored breathing on RA noted  Abd:    NTND per patient report  Lymph:    Neg for pitting/non-pitting edema BLE  Vasc:    Pulses palpable, CFT minimally delayed  Neuro:    Light touch sensation intact to all sensory nerve distributions without paresthesias  Derm:    Neg for nodules, lesions or ulcerations  MSK:    ROM, strength wnl without limitation, no pain on palpation noted.  Calf:    Neg for redness, swelling or tenderness  ***    Imaging:  ***  Labs:  ***  Cultures:  ***    Assessment:  89 year old male with ***      Plan:  Discussed etiologies, anatomy and options  1.  ***  -***    Follow up:  *** or sooner with acute issues      Patient's medical history was reviewed today    Body mass index is 27.1 kg/(m^2).  {Weight Management Plan -- Delete if patient has a normal BMI:849306}        Nikhil Kruger DPM FACFAS FACFAOM  Podiatric Foot & Ankle Surgeon  [facility]  [phone]

## 2018-09-06 NOTE — MR AVS SNAPSHOT
After Visit Summary   9/6/2018    Ernie Leary    MRN: [id number]           Patient Information     Date Of Birth          9/23/1924        Visit Information        Provider Department      9/6/2018 9:30 AM Nikhil Kruger DPM [facility]        Care Instructions    Lactic Acid or Urea Lotion    Thank you for choosing [facility] / [facility]!    DR. KRUGER'S CLINIC LOCATIONS:   MONDAY - [place] TUESDAY - [place]   [address] [facility]  [address]   [place] [address] [place], [place] [address] [phone]       THURSDAY AM - [place] THURSDAY PM - [place]   [address]   [place] [address] [place], [place] [address] [phone]       FRIDAY AM - [place] SET UP SURGERY: [phone] [address] APPOINTMENTS: [phone]   [place] [address] BILLING QUESTIONS: [phone] FAX NUMBER: [phone]     Follow Up:  As needed    FOOT CARE NURSES  If you are interested in having a foot care nurse come out to your   home, please call one of these contacts for more information:  [facility]  [phone] [facility]  [phone]   [facility]  [phone]  [facility] [phone]  [place]   [facility]  [phone]  [facility] [place] [facility] [phone]       TOE SPACERS        Body Mass Index (BMI)  Many things can cause foot and ankle problems. Foot structure, activity level, foot mechanics and injuries are common causes of pain. One very important issue that often goes unmentioned, is body weight. Extra weight can cause increased stress on muscles, ligaments, bones and tendons. Sometimes just a few extra pounds is all it takes to put one over her/his threshold. Without reducing that stress, it can be difficult to alleviate pain. Some people are uncomfortable addressing this issue, but we feel it  "is important for you to think about it. As Foot &  Ankle specialists, our job is addressing the lower extremity problem and possible causes. Regarding extra body weight, we encourage patients to discuss diet and weight management plans with their primary care doctors. It is this team approach that gives you the best opportunity for pain relief and getting you back on your feet.            Follow-ups after your visit        Who to contact     If you have questions or need follow up information about today's clinic visit or your schedule please contact [facility] directly at [phone].  Normal or non-critical lab and imaging results will be communicated to you by Abigail Stewarthart, letter or phone within 4 business days after the clinic has received the results. If you do not hear from us within 7 days, please contact the clinic through [employer] or phone. If you have a critical or abnormal lab result, we will notify you by phone as soon as possible.  Submit refill requests through [employer] or call your pharmacy and they will forward the refill request to us. Please allow 3 business days for your refill to be completed.          Additional Information About Your Visit        [employer] Information     [employer] gives you secure access to your electronic health record. If you see a primary care provider, you can also send messages to your care team and make appointments. If you have questions, please call your primary care clinic.  If you do not have a primary care provider, please call [phone] and they will assist you.        Care EveryWhere ID     This is your Care EveryWhere ID. This could be used by other organizations to access your [place] medical records  [id number]        Your Vitals Were     Height BMI (Body Mass Index)                5' 7\" (1.702 m) 27.1 kg/m2           Blood Pressure from Last 3 Encounters:   09/06/18 140/80   06/28/18 142/75   10/15/17 152/83    Weight from Last 3 Encounters: "   09/06/18 173 lb (78.5 kg)   06/28/18 175 lb (79.4 kg)   10/15/17 173 lb 4 oz (78.6 kg)              Today, you had the following     No orders found for display         Today's Medication Changes          These changes are accurate as of 9/6/18  9:51 AM.  If you have any questions, ask your nurse or doctor.               These medicines have changed or have updated prescriptions.        Dose/Directions    vitamin D 2000 units tablet   This may have changed:  when to take this   Used for:  Recurrent falls        Dose:  2000 Units   Take 2,000 Units by mouth daily   Quantity:  100 tablet   Refills:  3                Primary Care Provider Office Phone # Fax #    Forest Ashraf [phone] [address] [address]        Equal Access to Services     CRISTA VAIL : Yissel lim Socapri, waaxda luqadaha, qaybta kaalmada katiayadesiree, janice samson . So [facility] [phone].    ATENCIÓN: Si habla español, tiene a crane disposición servicios gratuitos de asistencia lingüística. Llame al [phone].    We comply with applicable federal civil rights laws and [place] laws. We do not discriminate on the basis of race, color, national origin, age, disability, sex, sexual orientation, or gender identity.            Thank you!     Thank you for choosing [facility]  for your care. Our goal is always to provide you with excellent care. Hearing back from our patients is one way we can continue to improve our services. Please take a few minutes to complete the written survey that you may receive in the mail after your visit with us. Thank you!             Your Updated Medication List - Protect others around you: Learn how to safely use, store and throw away your medicines at www.disposemymeds.org.          This list is accurate as of 9/6/18  9:51 AM.  Always use your most recent med list.                   Brand Name Dispense Instructions for use  Diagnosis    amLODIPine 2.5 MG tablet    NORVASC    180 tablet    Take 1 tablet (2.5 mg) by mouth 2 times daily    Essential hypertension, benign       calcium 600 MG tablet     180 tablet    Take 1 tablet by mouth every other day        calcium carbonate 500 MG chewable tablet    TUMS     Take 1 chew tab by mouth daily as needed.        COENZYME Q-10 PO      Take 100 mg by mouth every 14 days        CYANOCOBALAMIN PO      Take 1,000 mcg by mouth daily.        fosinopril 40 MG tablet    MONOPRIL    135 tablet    take 1/2 tablet by mouth in the morning and 1 tab in the evening    Essential hypertension, benign       levETIRAcetam 500 MG tablet    KEPPRA    60 tablet    Take 1 tablet (500 mg) by mouth 2 times daily        metoprolol tartrate 50 MG tablet    LOPRESSOR    90 tablet    Take one-half tablet by mouth twice daily    Essential hypertension, benign       prune juice Liqd      Take  by mouth daily (with breakfast).        psyllium 58.6 % Powd    METAMUCIL     Take 1 teaspoonful by mouth 3 times daily as needed. Constipation.        triamcinolone 0.1 % ointment    KENALOG    160 g    Apply sparingly to affected area twice daily for 10-14 days.    Dermatitis       vitamin B complex with vitamin C Tabs tablet      Take 1 tablet by mouth daily        vitamin D 2000 units tablet     100 tablet    Take 2,000 Units by mouth daily    Recurrent falls

## 2019-03-01 ENCOUNTER — DOCUMENTATION ONLY (OUTPATIENT)
Dept: OTHER | Facility: CLINIC | Age: 84
End: 2019-03-01

## 2019-05-10 ENCOUNTER — OFFICE VISIT (OUTPATIENT)
Dept: FAMILY MEDICINE | Facility: CLINIC | Age: 84
End: 2019-05-10
Payer: COMMERCIAL

## 2019-05-10 VITALS
WEIGHT: 180 LBS | BODY MASS INDEX: 28.25 KG/M2 | TEMPERATURE: 97.8 F | SYSTOLIC BLOOD PRESSURE: 146 MMHG | HEART RATE: 74 BPM | HEIGHT: 67 IN | DIASTOLIC BLOOD PRESSURE: 80 MMHG | OXYGEN SATURATION: 96 %

## 2019-05-10 DIAGNOSIS — I48.91 ATRIAL FIBRILLATION, UNSPECIFIED TYPE (H): ICD-10-CM

## 2019-05-10 DIAGNOSIS — Z86.79 HISTORY OF INTRACRANIAL HEMORRHAGE: ICD-10-CM

## 2019-05-10 DIAGNOSIS — I68.0 CEREBRAL AMYLOID ANGIOPATHY (H): ICD-10-CM

## 2019-05-10 DIAGNOSIS — R73.01 IFG (IMPAIRED FASTING GLUCOSE): ICD-10-CM

## 2019-05-10 DIAGNOSIS — G40.909 SEIZURE DISORDER (H): ICD-10-CM

## 2019-05-10 DIAGNOSIS — I10 ESSENTIAL HYPERTENSION: ICD-10-CM

## 2019-05-10 DIAGNOSIS — E85.4 CEREBRAL AMYLOID ANGIOPATHY (H): ICD-10-CM

## 2019-05-10 DIAGNOSIS — Z00.00 ROUTINE GENERAL MEDICAL EXAMINATION AT A HEALTH CARE FACILITY: Primary | ICD-10-CM

## 2019-05-10 LAB
ANION GAP SERPL CALCULATED.3IONS-SCNC: 6 MMOL/L (ref 3–14)
BUN SERPL-MCNC: 14 MG/DL (ref 7–30)
CALCIUM SERPL-MCNC: 9 MG/DL (ref 8.5–10.1)
CHLORIDE SERPL-SCNC: 104 MMOL/L (ref 94–109)
CO2 SERPL-SCNC: 26 MMOL/L (ref 20–32)
CREAT SERPL-MCNC: 1.05 MG/DL (ref 0.66–1.25)
ERYTHROCYTE [DISTWIDTH] IN BLOOD BY AUTOMATED COUNT: 13.5 % (ref 10–15)
GFR SERPL CREATININE-BSD FRML MDRD: 60 ML/MIN/{1.73_M2}
GLUCOSE SERPL-MCNC: 102 MG/DL (ref 70–99)
HBA1C MFR BLD: 6.1 % (ref 0–5.6)
HCT VFR BLD AUTO: 41 % (ref 40–53)
HGB BLD-MCNC: 13.8 G/DL (ref 13.3–17.7)
MCH RBC QN AUTO: 31 PG (ref 26.5–33)
MCHC RBC AUTO-ENTMCNC: 33.7 G/DL (ref 31.5–36.5)
MCV RBC AUTO: 92 FL (ref 78–100)
PLATELET # BLD AUTO: 242 10E9/L (ref 150–450)
POTASSIUM SERPL-SCNC: 4 MMOL/L (ref 3.4–5.3)
RBC # BLD AUTO: 4.45 10E12/L (ref 4.4–5.9)
SODIUM SERPL-SCNC: 136 MMOL/L (ref 133–144)
WBC # BLD AUTO: 7.5 10E9/L (ref 4–11)

## 2019-05-10 PROCEDURE — 83036 HEMOGLOBIN GLYCOSYLATED A1C: CPT | Performed by: INTERNAL MEDICINE

## 2019-05-10 PROCEDURE — 85027 COMPLETE CBC AUTOMATED: CPT | Performed by: INTERNAL MEDICINE

## 2019-05-10 PROCEDURE — 36415 COLL VENOUS BLD VENIPUNCTURE: CPT | Performed by: INTERNAL MEDICINE

## 2019-05-10 PROCEDURE — 80048 BASIC METABOLIC PNL TOTAL CA: CPT | Performed by: INTERNAL MEDICINE

## 2019-05-10 PROCEDURE — 99397 PER PM REEVAL EST PAT 65+ YR: CPT | Performed by: INTERNAL MEDICINE

## 2019-05-10 ASSESSMENT — MIFFLIN-ST. JEOR: SCORE: 1415.1

## 2019-05-10 ASSESSMENT — ACTIVITIES OF DAILY LIVING (ADL): CURRENT_FUNCTION: TRANSPORTATION REQUIRES ASSISTANCE

## 2019-05-10 NOTE — LETTER
[facility]  [address] [facility]  [address]  [place]  [address]  Tel: [phone]    May 13, 2019    Ernie Leary  [address]   [facility] [address]        Dear Mr. Leary,    The following letter pertains to your most recent diagnostic tests:    Lab results look stable.         Bottom line:  No need to change any medications based on these results.        Follow up:  Office visit appointment in 6 months for a recheck.      If you have any further questions or problems, please contact our office.      Sincerely,    Forest Ashraf MD/MONET          Enclosure: Lab Results  Results for orders placed or performed in visit on 05/10/19   CBC with platelets   Result Value Ref Range    WBC 7.5 4.0 - 11.0 10e9/L    RBC Count 4.45 4.4 - 5.9 10e12/L    Hemoglobin 13.8 13.3 - 17.7 g/dL    Hematocrit 41.0 40.0 - 53.0 %    MCV 92 78 - 100 fl    MCH 31.0 26.5 - 33.0 pg    MCHC 33.7 31.5 - 36.5 g/dL    RDW 13.5 10.0 - 15.0 %    Platelet Count 242 150 - 450 10e9/L   Hemoglobin A1c   Result Value Ref Range    Hemoglobin A1C 6.1 (H) 0 - 5.6 %   Basic metabolic panel   Result Value Ref Range    Sodium 136 133 - 144 mmol/L    Potassium 4.0 3.4 - 5.3 mmol/L    Chloride 104 94 - 109 mmol/L    Carbon Dioxide 26 20 - 32 mmol/L    Anion Gap 6 3 - 14 mmol/L    Glucose 102 (H) 70 - 99 mg/dL    Urea Nitrogen 14 7 - 30 mg/dL    Creatinine 1.05 0.66 - 1.25 mg/dL    GFR Estimate 60 (L) >60 mL/min/[1.73_m2]    GFR Estimate If Black 70 >60 mL/min/[1.73_m2]    Calcium 9.0 8.5 - 10.1 mg/dL

## 2019-05-10 NOTE — PROGRESS NOTES
"SUBJECTIVE:   Ernie Leary is a 89 year old male who presents for Preventive Visit.    Are you in the first 12 months of your Medicare coverage?  No    Healthy Habits:     In general, how would you rate your overall health?  Good    Frequency of exercise:: daily     Duration of exercise:  15-30 minutes (walking, PT exercises)    Do you usually eat at least 4 servings of fruit and vegetables a day, include whole grains    & fiber and avoid regularly eating high fat or \"junk\" foods?  Yes    Taking medications regularly:  Yes    Barriers to taking medications:  None    Medication side effects:  Not applicable    Ability to successfully perform activities of daily living:  Transportation requires assistance    Home Safety:  No safety concerns identified    Hearing Impairment:  Difficulty following a conversation in a noisy restaurant or crowded room, feel that people are mumbling or not speaking clearly and need to ask people to speak up or repeat themselves (hearing aids)    In the past 6 months, have you been bothered by leaking of urine?  No    In general, how would you rate your overall mental or emotional health?  Good      PHQ-2 Total Score: 0    Additional concerns today:  No    Do you feel safe in your environment? YES    Do you have a Health Care Directive? Yes: Advance Directive has been received and scanned.      Fall risk  Fallen 2 or more times in the past year?: No  Any fall with injury in the past year?: No    Cognitive Screening   1) Repeat 3 items (Leader, Season, Table)    2) Clock draw: NORMAL  3) 3 item recall: Recalls 3 objects  Results: 3 items recalled: COGNITIVE IMPAIRMENT LESS LIKELY    Mini-CogTM Copyright LUIS Butler. Licensed by the author for use in [facility]; reprinted with permission (glenroy@.[facility]). All rights reserved.      Do you have sleep apnea, excessive snoring or daytime drowsiness?: no    Reviewed and updated as needed this visit by clinical staff  Tobacco  Allergies "  Meds  Med Hx  Surg Hx  Fam Hx  Soc Hx        Reviewed and updated as needed this visit by Provider  Tobacco  Med Hx  Surg Hx  Fam Hx  Soc Hx       Social History     Tobacco Use     Smoking status: Former Smoker     Packs/day: 2.00     Years: 30.00     Pack years: 60.00     Types: Cigarettes     Last attempt to quit: 1981     Years since quittin.3     Smokeless tobacco: Never Used   Substance Use Topics     Alcohol use: No     Alcohol/week: 0.0 oz     If you drink alcohol do you typically have >3 drinks per day or >7 drinks per week? No    Alcohol Use 5/10/2019   Prescreen: >3 drinks/day or >7 drinks/week? No       Current providers sharing in care for this patient include:   Patient Care Team:  Forest Ashraf MD as PCP - General (Internal Medicine)  Norma Mcbride MD as MD (Internal Medicine)  Forest Ashraf MD as Assigned PCP    The following health maintenance items are reviewed in Epic and correct as of today:  Health Maintenance   Topic Date Due     MEDICARE ANNUAL WELLNESS VISIT  2018     PHQ-2  2019     LIPID MONITORING Q1 YEAR  2019     FALL RISK ASSESSMENT  2019     INFLUENZA VACCINE (Season Ended) 2019     DTAP/TDAP/TD IMMUNIZATION (4 - Td) 2022     ADVANCE DIRECTIVE PLANNING Q5 YRS  2024     PNEUMOCOCCAL IMMUNIZATION 65+ LOW/MEDIUM RISK  Completed     ZOSTER IMMUNIZATION  Completed     IPV IMMUNIZATION  Aged Out     MENINGITIS IMMUNIZATION  Aged Out     Patient Active Problem List   Diagnosis     Essential hypertension, benign     Impotence of organic origin     Atrial fibrillation (H)     Normocytic anemia     Hyperlipidemia LDL goal <130     Benign prostatic hyperplasia without lower urinary tract symptoms     BPH (benign prostatic hyperplasia)     Anemia     IFG (impaired fasting glucose)     Microalbuminuria     HTN (hypertension)     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)     Transient cerebral ischemia     Cerebral  amyloid angiopathy (H)     History of intracranial hemorrhage     TIA (transient ischemic attack)     Seizure disorder (H)     Past Surgical History:   Procedure Laterality Date     ------------OTHER-------------  2008    Cardiac ablation     ADJACENT TISSUE TRANSFER, FOREHEAD/CHEEKS/CHIN/MOUTH/NECK/AXILLAE/GENITALIA/HANDS/FEET, < OR = 10CM       ARTHROTOMY SHOULDER, ROTATOR CUFF REPAIR, COMBINED  2011    Procedure:COMBINED ARTHROTOMY SHOULDER, ROTATOR CUFF REPAIR; LEFT SHOULDER OPEN ROTATOR CUFF REPAIR, BURSECTOMY, DISTAL CLAVICLE RESECTION; Surgeon:KHALIDA ZABALA; Location: OR     BIOPSY      multiple skin biopsies for skin cancers     CARDIAC SURGERY       CYSTOSCOPY, TRANSURETHRAL RESECTION (TUR) PROSTATE, COMBINED  8/15/2012    Procedure: COMBINED CYSTOSCOPY, TRANSURETHRAL RESECTION (TUR) PROSTATE;  CYSTOSCOPY, TRANSURETHRAL RESECTION  OF  PROSTATE WITH PLASMA LOOP;  Surgeon: Harmeet Kilgore MD;  Location:  OR     HERNIORRHAPHY INGUINAL Right 2015    Procedure: HERNIORRHAPHY INGUINAL;  Surgeon: Scottie Nunn MD;  Location:  [facility]     PHACOEMULSIFICATION CLEAR CORNEA WITH TORIC INTRAOCULAR LENS IMPLANT Right 2017    Procedure: PHACOEMULSIFICATION CLEAR CORNEA WITH TORIC INTRAOCULAR LENS IMPLANT;  RIGHT PHACOEMULSIFICATION CLEAR CORNEA WITH TORIC INTRAOCULAR LENS IMPLANT ;  Surgeon: Yoel Calero MD;  Location:  [facility]     PHACOEMULSIFICATION CLEAR CORNEA WITH TORIC INTRAOCULAR LENS IMPLANT Left 2017    Procedure: PHACOEMULSIFICATION CLEAR CORNEA WITH TORIC INTRAOCULAR LENS IMPLANT;  LEFT PHACOEMULSIFICATION CLEAR CORNEA WITH TORIC INTRAOCULAR LENS IMPLANT ;  Surgeon: Yoel Calero MD;  Location:  [facility]       Social History     Tobacco Use     Smoking status: Former Smoker     Packs/day: 2.00     Years: 30.00     Pack years: 60.00     Types: Cigarettes     Last attempt to quit: 1981     Years since quittin.3     Smokeless tobacco: Never Used   Substance Use Topics  "    Alcohol use: No     Alcohol/week: 0.0 oz     History reviewed. No pertinent family history.      Current Outpatient Medications   Medication Sig Dispense Refill     amLODIPine (NORVASC) 2.5 MG tablet Take 1 tablet (2.5 mg) by mouth 2 times daily 180 tablet 3     calcium 600 MG tablet Take 1 tablet by mouth every other day  180 tablet 3     calcium carbonate (TUMS) 500 MG chewable tablet Take 1 chew tab by mouth daily as needed.       Cholecalciferol (VITAMIN D) 2000 UNITS tablet Take 2,000 Units by mouth daily (Patient taking differently: Take 2,000 Units by mouth every other day ) 100 tablet 3     COENZYME Q-10 PO Take 100 mg by mouth every 14 days        CYANOCOBALAMIN PO Take 1,000 mcg by mouth daily.       fosinopril (MONOPRIL) 40 MG tablet take 1/2 tablet by mouth in the morning and 1 tab in the evening 135 tablet 3     levETIRAcetam (KEPPRA) 500 MG tablet Take 1 tablet (500 mg) by mouth 2 times daily 60 tablet 0     metoprolol tartrate (LOPRESSOR) 50 MG tablet Take one-half tablet by mouth twice daily 90 tablet 3     prune juice LIQD Take  by mouth daily (with breakfast).       psyllium (METAMUCIL) 58.6 % POWD Take 1 teaspoonful by mouth 3 times daily as needed. Constipation.       triamcinolone (KENALOG) 0.1 % ointment Apply sparingly to affected area twice daily for 10-14 days. 160 g 0     vitamin B complex with vitamin C (VITAMIN  B COMPLEX) TABS tablet Take 1 tablet by mouth daily       Allergies   Allergen Reactions     Adrenalin [Epinephrine Hcl]      Hctz      hyponatremia         Review of Systems  Constitutional, HEENT, cardiovascular, pulmonary, gi and gu systems are negative, except as otherwise noted.    OBJECTIVE:   /80 (BP Location: Left arm, Patient Position: Sitting, Cuff Size: Adult Large)   Pulse 74   Temp 97.8  F (36.6  C) (Oral)   Ht 1.702 m (5' 7\")   Wt 81.6 kg (180 lb)   SpO2 96%   BMI 28.19 kg/m   Estimated body mass index is 28.19 kg/m  as calculated from the " "following:    Height as of this encounter: 1.702 m (5' 7\").    Weight as of this encounter: 81.6 kg (180 lb).  Physical Exam  GENERAL: healthy, alert and no distress  EYES: Eyes grossly normal to inspection, PERRL and conjunctivae and sclerae normal  HENT: ear canals and TM's normal, nose and mouth without ulcers or lesions  NECK: no adenopathy, no asymmetry, masses, or scars and thyroid normal to palpation  RESP: lungs clear to auscultation - no rales, rhonchi or wheezes  CV: regular rate and rhythm, normal S1 S2, no S3 or S4, no murmur, click or rub, no peripheral edema and peripheral pulses strong  ABDOMEN: soft, nontender, no hepatosplenomegaly, no masses and bowel sounds normal  MS: no gross musculoskeletal defects noted, no edema  SKIN: no suspicious lesions or rashes  NEURO: Normal strength and tone, mentation intact and speech normal  PSYCH: mentation appears normal, affect normal/bright    Diagnostic Test Results:  Labs pending     ASSESSMENT / PLAN:   1. Routine general medical examination at a health care facility      2. Seizure disorder (H)  Stable, continue follow up with neurology     3. Cerebral amyloid angiopathy (H)  Not a candidate for anticoagulants for stroke prophylaxis based on this and history of hemorrhage     4. Atrial fibrillation, unspecified type (H)  Rate controlled today; rhythm regular today; see discussion above re anticoagulants     5. History of intracranial hemorrhage      6. Essential hypertension  OK control; continue current medications   - CBC with platelets  - Basic metabolic panel    7. IFG (impaired fasting glucose)    - Hemoglobin A1c    End of Life Planning:  Patient currently has an advanced directive: Yes.  Practitioner is supportive of decision.    COUNSELING:  Reviewed preventive health counseling, as reflected in patient instructions  Special attention given to:       Regular exercise       Healthy diet/nutrition       Immunizations    Vaccines are up to date   " "        BP Readings from Last 1 Encounters:   05/10/19 146/80     Estimated body mass index is 28.19 kg/m  as calculated from the following:    Height as of this encounter: 1.702 m (5' 7\").    Weight as of this encounter: 81.6 kg (180 lb).      Weight management plan: Discussed healthy diet and exercise guidelines     reports that he quit smoking about 38 years ago. His smoking use included cigarettes. He has a 60.00 pack-year smoking history. He has never used smokeless tobacco.      Appropriate preventive services were discussed with this patient, including applicable screening as appropriate for cardiovascular disease, diabetes, osteopenia/osteoporosis, and glaucoma.  As appropriate for age/gender, discussed screening for colorectal cancer, prostate cancer, breast cancer, and cervical cancer. Checklist reviewing preventive services available has been given to the patient.    Reviewed patients plan of care and provided an AVS. The Basic Care Plan (routine screening as documented in Health Maintenance) for Ernie meets the Care Plan requirement. This Care Plan has been established and reviewed with the Patient.    Counseling Resources:  ATP IV Guidelines  Pooled Cohorts Equation Calculator  Breast Cancer Risk Calculator  FRAX Risk Assessment  ICSI Preventive Guidelines  Dietary Guidelines for Americans, 2010  USDA's MyPlate  ASA Prophylaxis  Lung CA Screening    Forest Ashraf MD  [facility]    Identified Health Risks:  "

## 2019-05-11 NOTE — RESULT ENCOUNTER NOTE
The following letter pertains to your most recent diagnostic tests:    Lab results look stable.         Bottom line:  No need to change any medications based on these results.        Follow up:  Office visit appointment in 6 months for a recheck.        Sincerely,    Dr. Ashraf

## 2019-06-15 DIAGNOSIS — I10 ESSENTIAL HYPERTENSION, BENIGN: ICD-10-CM

## 2019-06-17 RX ORDER — FOSINOPRIL SODIUM 40 MG/1
TABLET ORAL
Qty: 135 TABLET | Refills: 2 | Status: SHIPPED | OUTPATIENT
Start: 2019-06-17 | End: 2020-02-28

## 2019-06-17 NOTE — TELEPHONE ENCOUNTER
"Prescription approved per [facility] Refill Protocol.  Opal PICHARDO RN    Last Written Prescription Date:  6/28/2018  Last Fill Quantity: 135,  # refills: 3   Last office visit: 5/10/2019 with prescribing provider:     Future Office Visit:    Requested Prescriptions   Pending Prescriptions Disp Refills     fosinopril (MONOPRIL) 40 MG tablet [Pharmacy Med Name: Fosinopril Sodium Oral Tablet 40 MG] 135 tablet 2     Sig: take 1/2 tab by mouth in the morning and 1 tab in the evening       ACE Inhibitors (Including Combos) Protocol Failed - 6/15/2019  7:24 PM        Failed - Blood pressure under 140/90 in past 12 months     BP Readings from Last 3 Encounters:   05/10/19 146/80   09/06/18 140/80   06/28/18 142/75                 Passed - Recent (12 mo) or future (30 days) visit within the authorizing provider's specialty     Patient had office visit in the last 12 months or has a visit in the next 30 days with authorizing provider or within the authorizing provider's specialty.  See \"Patient Info\" tab in inbasket, or \"Choose Columns\" in Meds & Orders section of the refill encounter.              Passed - Medication is active on med list        Passed - Patient is age 18 or older        Passed - Normal serum creatinine on file in past 12 months     Recent Labs   Lab Test 05/10/19  1116  11/03/12  2126   CR 1.05   < >  --    CREAT  --   --  0.9    < > = values in this interval not displayed.             Passed - Normal serum potassium on file in past 12 months     Recent Labs   Lab Test 05/10/19  1116   POTASSIUM 4.0               "

## 2019-09-03 DIAGNOSIS — I10 ESSENTIAL HYPERTENSION, BENIGN: ICD-10-CM

## 2019-09-03 RX ORDER — METOPROLOL TARTRATE 50 MG
TABLET ORAL
Qty: 90 TABLET | Refills: 3 | Status: SHIPPED | OUTPATIENT
Start: 2019-09-03 | End: 2020-08-19

## 2019-09-03 RX ORDER — AMLODIPINE BESYLATE 2.5 MG/1
2.5 TABLET ORAL 2 TIMES DAILY
Qty: 180 TABLET | Refills: 3 | Status: SHIPPED | OUTPATIENT
Start: 2019-09-03 | End: 2020-08-19

## 2019-09-03 NOTE — TELEPHONE ENCOUNTER
Reason for Call:  Medication or medication refill:    Do you use a [facility]?  Name of the pharmacy and phone number for the current request:      [facility][id number] - [place], [place] - [address]      Name of the medication requested:   amLODIPine (NORVASC) 2.5 MG tablet  metoprolol tartrate (LOPRESSOR) 50 MG tablet    Other request: The patient has been taking his wife's Amlodipine because he ran out and it has not been filled so wife will run out early as well.    Can we leave a detailed message on this number? YES    Phone number patient can be reached at: Home number on file [phone] (home)    Best Time: anytime    Call taken on 9/3/2019 at 2:51 PM by Linh Shelley

## 2019-09-04 NOTE — TELEPHONE ENCOUNTER
"Prescription approved per [facility] Refill Protocol.      6. Essential hypertension  OK control; continue current medications   - CBC with platelets  - Basic metabolic panel        amLODIPine (NORVASC) 2.5 MG tablet  Last Written Prescription Date:  6/28/18  Last Fill Quantity: 180,  # refills: 3   Last office visit: 5/10/2019 with prescribing provider:  Pascale   Future Office Visit:   Next 5 appointments (look out 90 days)    Nov 11, 2019 11:30 AM CST  Office Visit with Forest Ashraf MD  [facility] [address] [address]  [phone]         metoprolol tartrate (LOPRESSOR) 50 MG tablet  Last Written Prescription Date:  6/28/19  Last Fill Quantity: 90,  # refills: 3   Last office visit: 5/10/2019 with prescribing provider:  Pascale   Future Office Visit:   Next 5 appointments (look out 90 days)    Nov 11, 2019 11:30 AM CST  Office Visit with Forest Ashraf MD  [facility] [address] [address]  [phone]         Requested Prescriptions   Pending Prescriptions Disp Refills     amLODIPine (NORVASC) 2.5 MG tablet 180 tablet 3     Sig: Take 1 tablet (2.5 mg) by mouth 2 times daily       Calcium Channel Blockers Protocol  Failed - 9/3/2019  3:03 PM        Failed - Blood pressure under 140/90 in past 12 months     BP Readings from Last 3 Encounters:   05/10/19 146/80   09/06/18 140/80   06/28/18 142/75                 Passed - Recent (12 mo) or future (30 days) visit within the authorizing provider's specialty     Patient had office visit in the last 12 months or has a visit in the next 30 days with authorizing provider or within the authorizing provider's specialty.  See \"Patient Info\" tab in inbasket, or \"Choose Columns\" in Meds & Orders section of the refill encounter.              Passed - Medication is active on med list        Passed - Patient is age 18 or older        Passed - Normal serum creatinine on " "file in past 12 months     Recent Labs   Lab Test 05/10/19  1116  11/03/12  2126   CR 1.05   < >  --    CREAT  --   --  0.9    < > = values in this interval not displayed.             metoprolol tartrate (LOPRESSOR) 50 MG tablet 90 tablet 3     Sig: Take one-half tablet by mouth twice daily       Beta-Blockers Protocol Failed - 9/3/2019  3:03 PM        Failed - Blood pressure under 140/90 in past 12 months     BP Readings from Last 3 Encounters:   05/10/19 146/80   09/06/18 140/80   06/28/18 142/75                 Passed - Patient is age 6 or older        Passed - Recent (12 mo) or future (30 days) visit within the authorizing provider's specialty     Patient had office visit in the last 12 months or has a visit in the next 30 days with authorizing provider or within the authorizing provider's specialty.  See \"Patient Info\" tab in inbasket, or \"Choose Columns\" in Meds & Orders section of the refill encounter.              Passed - Medication is active on med list            "

## 2019-09-30 ENCOUNTER — HEALTH MAINTENANCE LETTER (OUTPATIENT)
Age: 84
End: 2019-09-30

## 2019-10-14 ENCOUNTER — APPOINTMENT (OUTPATIENT)
Dept: GENERAL RADIOLOGY | Facility: CLINIC | Age: 84
End: 2019-10-14
Attending: EMERGENCY MEDICINE
Payer: COMMERCIAL

## 2019-10-14 ENCOUNTER — HOSPITAL ENCOUNTER (EMERGENCY)
Facility: CLINIC | Age: 84
Discharge: HOME | End: 2019-10-14
Attending: EMERGENCY MEDICINE | Admitting: EMERGENCY MEDICINE
Payer: COMMERCIAL

## 2019-10-14 VITALS
WEIGHT: 165 LBS | BODY MASS INDEX: 25.01 KG/M2 | HEIGHT: 68 IN | RESPIRATION RATE: 20 BRPM | OXYGEN SATURATION: 96 % | TEMPERATURE: 97.5 F | SYSTOLIC BLOOD PRESSURE: 174 MMHG | DIASTOLIC BLOOD PRESSURE: 78 MMHG | HEART RATE: 84 BPM

## 2019-10-14 DIAGNOSIS — T14.8XXA MULTIPLE SKIN TEARS: ICD-10-CM

## 2019-10-14 DIAGNOSIS — M79.622 PAIN OF LEFT UPPER ARM: ICD-10-CM
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
## 2019-10-14 PROCEDURE — 73090 X-RAY EXAM OF FOREARM: CPT | Mod: LT

## 2019-10-14 PROCEDURE — 73130 X-RAY EXAM OF HAND: CPT | Mod: LT

## 2019-10-14 PROCEDURE — 99284 EMERGENCY DEPT VISIT MOD MDM: CPT

## 2019-10-14 PROCEDURE — 73080 X-RAY EXAM OF ELBOW: CPT | Mod: LT

## 2019-10-14 ASSESSMENT — ENCOUNTER SYMPTOMS
NECK PAIN: 0
ARTHRALGIAS: 1
WOUND: 1
ABDOMINAL PAIN: 0
MYALGIAS: 1
SHORTNESS OF BREATH: 0

## 2019-10-14 ASSESSMENT — MIFFLIN-ST. JEOR: SCORE: 1357.94

## 2019-10-14 NOTE — ED AVS SNAPSHOT
Emergency Department  [address] [address]  Phone:  [phone]  Fax:  [phone]                                    Ernie Leary   MRN: [id number]    Department:   Emergency Department   Date of Visit:  10/14/2019           After Visit Summary Signature Page    I have received my discharge instructions, and my questions have been answered. I have discussed any challenges I see with this plan with the nurse or doctor.    ..........................................................................................................................................  Patient/Patient Representative Signature      ..........................................................................................................................................  Patient Representative Print Name and Relationship to Patient    ..................................................               ................................................  Date                                   Time    ..........................................................................................................................................  Reviewed by Signature/Title    ...................................................              ..............................................  Date                                               Time          22EPIC Rev 08/18

## 2019-10-15 NOTE — DISCHARGE INSTRUCTIONS
Keep the tape in place until it falls off  Change dressings daily - put nonstick dressing on the areas then gauze  Watch for signs of infection which include spreading redness, pus drainage, fever    Discharge Instructions  Laceration (Cut)    You were seen today for a laceration (cut).  Your provider examined your laceration for any problems such a buried foreign body (like glass, a splinter, or gravel), or injury to blood vessels, tendons, and nerves.  Your provider may have also rinsed and/or scrubbed your laceration to help prevent an infection. It may not be possible to find all problems with your laceration on the first visit; occasionally foreign bodies or a tendon injury can go undetected.    Your laceration may have been closed in one of several ways:  No closure: many wounds will heal just fine without closure.  Stitches: regular stitches that require removal.  Staples: skin staples are often used in the scalp/head.  Wound adhesive (glue): skin glue can be used for certain lacerations and doesn t require removal.  Wound strips (aka Butterfly bandages or steri-strips): these are bandages that help to close a wound.  Absorbable stitches:  dissolving  stitches that go away on their own and usually don t require removal.    A small percentage of wounds will develop an infection regardless of how well the wound is cared for. Antibiotics are generally not indicated to prevent an infection so are only given for a small number of high-risk wounds. Some lacerations are too high risk to close, and are left open to heal because closure can increase the likelihood that an infection will develop.    Remember that all lacerations, no matter how expertly repaired, will cause scarring. We consider many factors, techniques, and materials, in our efforts to provide the best possible cosmetic outcome.    Generally, every Emergency Department visit should have a follow-up clinic visit with either a primary or a specialty  clinic/provider. Please follow-up as instructed by your emergency provider today.     Return to the Emergency Department right away if:  You have more redness, swelling, pain, drainage (pus), a bad smell, or red streaking from your laceration as these symptoms could indicate an infection.  You have a fever of 100.4 F or more.  You have bleeding that you cannot stop at home. If your cut starts to bleed, hold pressure on the bleeding area with a clean cloth or put pressure over the bandage.  If the bleeding does not stop after using constant pressure for 30 minutes, you should return to the Emergency Department for further treatment.  An area past the laceration is cool, pale, or blue compared with the other side, or has a slower return of color when squeezed.  Your dressing seems too tight or starts to get uncomfortable or painful. For children, signs of a problem might be irritability or restlessness.  You have loss of normal function or use of an area, such as being unable to straighten or bend a finger normally.  You have a numb area past the laceration.    Return to the Emergency Department or see your regular provider if:  The laceration starts to come open.   You have something coming out of the cut or a feeling that there is something in the laceration.  Your wound will not heal, or keeps breaking open. There can always be glass, wood, dirt or other things in any wound.  They will not always show up, even on x-rays.  If a wound does not heal, this may be why, and it is important to follow-up with your regular provider.    Home Care:  Take your dressing off in 12-24 hours, or as instructed by your provider, to check your laceration. Remove the dressing sooner if it seems too tight or painful, or if it is getting numb, tingly, or pale past the dressing.  Gently wash your laceration 1-2 times daily with clean water and mild soap. It is okay to shower or run clean water over the laceration, but do not let the  laceration soak in water (no swimming).  If your laceration was closed with wound adhesive or strips: pat it dry and leave it open to the air. For all other repairs: after you wash your laceration, or at least 2 times a day, apply antibiotic ointment (such as Neosporin  or Bacitracin ) to the laceration, then cover it with a Band-Aid  or gauze.  Keep the laceration clean. Wear gloves or other protective clothing if you are around dirt.    Follow-up for removal:  If your wound was closed with staples or regular stitches, they need to be removed according to the instructions and timeline specified by your provider today.  If your wound was closed with absorbable ( dissolving ) sutures, they should fall out, dissolve, or not be visible in about one week. If they are still visible, then they should be removed according to the instructions and timeline specified by your provider today.    Scars:  To help minimize scarring:  Wear sunscreen over the healed laceration when out in the sun.  Massage the area regularly once healed.  You may apply Vitamin E to the healed wound.  Wait. Scars improve in appearance over months and years.    If you were given a prescription for medicine here today, be sure to read all of the information (including the package insert) that comes with your prescription.  This will include important information about the medicine, its side effects, and any warnings that you need to know about.  The pharmacist who fills the prescription can provide more information and answer questions you may have about the medicine.  If you have questions or concerns that the pharmacist cannot address, please call or return to the Emergency Department.       Remember that you can always come back to the Emergency Department if you are not able to see your regular provider in the amount of time listed above, if you get any new symptoms, or if there is anything that worries you.

## 2019-10-15 NOTE — ED PROVIDER NOTES
History     Chief Complaint:  Fall      HPI   Ernie Leary is a 89 year old male with a history of stroke who presents after a fall. The patient says that he had the fall around 1730, he says that he was stepping out of his car when he tripped and fell on his left side. He received multiple scrapes along his left arm. He says that he did not hit his head or have any loss of consciousness. He says that he was able to walk after the fall. He denies any pain in his; legs, right arm, neck, back, chest, abdomen, hips, or shoulders. He does note some pain in his left arm in the areas that he received the lacerations.     Allergies:  Adrenalin  Hctz    Medications:    Norvasc  Co- Q  Cyanocobalamin  Monopril  Keppra  Lopressor  Metamucil  Kenalog      Past Medical History:    Syncope  Stroke  Seizure disorder  Sebaceous cyst  Hyperlipidemia  Hemorrhagic stroke  hypertension  Diabetes  Atrial  flutter  Abnormal glucose   TIA  Cerebral amyloid angiopathy  SIADH  Transient cerebral ischemia  benign prostatic hyperplasia  Normocytic anemia   atrial fibrillation  Impotence of organic origin     Past Surgical History:    Cardiac ablation  Soft tissue surgery  Arthrotomy shoulder, rotator cuff repair  Biopsy  Cardiac surgery  Cystoscopy, transurethral resection  Herniorrhaphy  Inguinal  Phacoemulsification  clear cornea with toric intraocular lens implant x2    Family History:    Family history reviewed. No pertinent family history.     Social History:  The patient was accompanied to the ED by wife and friends.  Smoking Status: Former Smoker  Smokeless Tobacco: Never Used  Alcohol Use: Negative  Drug Use: Negative  PCP: Forest Ashraf   Marital Status:        Review of Systems   Respiratory: Negative for shortness of breath.    Cardiovascular: Negative for chest pain.   Gastrointestinal: Negative for abdominal pain.   Musculoskeletal: Positive for arthralgias and myalgias. Negative for gait problem and neck pain.  "  Skin: Positive for wound.   Neurological: Negative for syncope.   All other systems reviewed and are negative.    Physical Exam     Patient Vitals for the past 24 hrs:   BP Temp Temp src Pulse Resp SpO2 Height Weight   10/14/19 1932 (!) 174/78 97.5  F (36.4  C) Oral 84 20 96 % 1.727 m (5' 8\") 74.8 kg (165 lb)        Physical Exam    General: Resting comfortably on the gurney  Eyes:  The pupils are equal and round    Conjunctivae and sclerae are normal  ENT:    No head trauma  Neck:  Normal range of motion, no neck tenderness  CV:  Regular rate and rhythm    Skin warm and well perfused   Resp:  Lungs are clear    Non-labored    No rales    No wheezing   GI:  Abdomen is soft, there is no rigidity    No distension    No rebound tenderness     No abdominal tenderness  MS:  Mild tenderness on left hand, forearm and elbow. Non tender back, non tender left shoulder. Non tender RUE or LE  Skin:  Skin tear on dorsum of left hand, distal forearm, left elbow with no active bleeding  Neuro:   Awake, alert.      GCS 15    Speech is normal and fluent.    Face is symmetric.     Moves all extremities equally    SILT on LUE  Psych: Normal affect.  Appropriate interactions.       Emergency Department Course     Imaging:  Radiology findings were communicated with the patient who voiced understanding of the findings.    Radius/Ulna XR,  PA &LAT, left  No fracture or intraosseous lesion. Degenerative changes  at the elbow and thumb base. Bone  demineralization. No soft tissue abnormality is evident.  FLORENCIO NEGRON MD  Reading per radiology    Elbow  XR, G/E 3 views, left  No fracture or subluxation. Moderate left elbow  degenerative arthrosis. No intraosseous lesion. No joint effusion. No  soft tissue abnormality is evident.  FLORENCIO NEGRON MD  Reading per radiology    XR Hand Left G/E 3 Views    (Results Pending)      Emergency Department Course:    1946 Nursing notes and vitals reviewed.    2003 I performed an exam of the patient " as documented above.     2027 The patient was sent for a XR while in the emergency department, results above.      2150 Patient rechecked and updated.       The patient is discharged to home.     Impression & Plan      Medical Decision Making:  Ernie Leray is a 89 year old male who presents to the emergency department today for evaluation of fall. This was mechanical fall. No fractures on xrays. Hand xray not resulted on discharge as imaging/PACS went down for awhile but I did not see any fractures so I discussed with family that I would call them if xray hand showed any findings. On review of xray after it was interpreted by radiologist, no fractures. Has multiple skin tears but no lacerations requiring sutures. Steri strips applied then dressings. Discussed how to manage dressing changes at home. Tetanus up to date. No antibiotics indicated. Discussed watching for signs of infection. No other injuries from fall.     Diagnosis:    ICD-10-CM    1. Fall, initial encounter W19.XXXA    2. Multiple skin tears T14.8XXA    3. Pain of left upper arm M79.622      Disposition:   Findings and plan explained to the Patient. Patient discharged home with instructions regarding supportive care, medications, and reasons to return. The importance of close follow-up was reviewed.     Scribe Disclosure:  I, Heriberto Gale, am serving as a scribe at 7:48 PM on 10/14/2019 to document services personally performed by Vanessa Blum MD based on my observations and the provider's statements to me.       EMERGENCY DEPARTMENT       Vanessa Blum MD  10/15/19 [id number]

## 2019-10-16 ENCOUNTER — OFFICE VISIT (OUTPATIENT)
Dept: FAMILY MEDICINE | Facility: CLINIC | Age: 84
End: 2019-10-16
Payer: COMMERCIAL

## 2019-10-16 VITALS
TEMPERATURE: 97.3 F | DIASTOLIC BLOOD PRESSURE: 73 MMHG | BODY MASS INDEX: 27.28 KG/M2 | WEIGHT: 180 LBS | HEIGHT: 68 IN | SYSTOLIC BLOOD PRESSURE: 148 MMHG | OXYGEN SATURATION: 98 % | HEART RATE: 66 BPM

## 2019-10-16 DIAGNOSIS — T14.8XXA MULTIPLE SKIN TEARS: ICD-10-CM

## 2019-10-16 DIAGNOSIS — L03.114 CELLULITIS OF LEFT UPPER EXTREMITY: Primary | ICD-10-CM

## 2019-10-16 PROCEDURE — 99214 OFFICE O/P EST MOD 30 MIN: CPT | Performed by: PHYSICIAN ASSISTANT

## 2019-10-16 RX ORDER — CEPHALEXIN 500 MG/1
500 CAPSULE ORAL 3 TIMES DAILY
Qty: 30 CAPSULE | Refills: 0 | Status: SHIPPED | OUTPATIENT
Start: 2019-10-16 | End: 2019-11-11

## 2019-10-16 ASSESSMENT — MIFFLIN-ST. JEOR: SCORE: 1425.97

## 2019-10-16 NOTE — PROGRESS NOTES
HPI: Ernie is a 89 yo male here for ED f/u.   See note dated 10/14/19.  He is with wife today.  Statees he fell getting out of a friend's rental car on Monday evening.  Denies hitting head or LOC  Has some pain in R arm due to the fall  Xray neg for fracture.  Last tdap was in 2012  Steristrips placed in ED and here for recheck of his skin tears today    Past Medical History:   Diagnosis Date     Abnormal glucose      Atrial flutter (H)     ablation 2008     Diabetes mellitus (H)     not currently being treated, diet controlled at this time     Essential hypertension, benign      Hemorrhagic stroke (H) 2012     Hyperlipidemia LDL goal <130 2/7/2011     Other and unspecified hyperlipidemia      Sebaceous cyst      Seizure disorder (H) 7/21/2017     Stroke (H)      Syncope and collapse 11/15/01     Past Surgical History:   Procedure Laterality Date     ------------OTHER-------------  1/1/2008    Cardiac ablation     ADJACENT TISSUE TRANSFER, FOREHEAD/CHEEKS/CHIN/MOUTH/NECK/AXILLAE/GENITALIA/HANDS/FEET, < OR = 10CM       ARTHROTOMY SHOULDER, ROTATOR CUFF REPAIR, COMBINED  11/18/2011    Procedure:COMBINED ARTHROTOMY SHOULDER, ROTATOR CUFF REPAIR; LEFT SHOULDER OPEN ROTATOR CUFF REPAIR, BURSECTOMY, DISTAL CLAVICLE RESECTION; Surgeon:KHALIDA ZABALA; Location: OR     BIOPSY      multiple skin biopsies for skin cancers     CARDIAC SURGERY       CYSTOSCOPY, TRANSURETHRAL RESECTION (TUR) PROSTATE, COMBINED  8/15/2012    Procedure: COMBINED CYSTOSCOPY, TRANSURETHRAL RESECTION (TUR) PROSTATE;  CYSTOSCOPY, TRANSURETHRAL RESECTION  OF  PROSTATE WITH PLASMA LOOP;  Surgeon: Harmeet Kilgore MD;  Location:  OR     HERNIORRHAPHY INGUINAL Right 5/29/2015    Procedure: HERNIORRHAPHY INGUINAL;  Surgeon: Scottie Nunn MD;  Location:  [facility]     PHACOEMULSIFICATION CLEAR CORNEA WITH TORIC INTRAOCULAR LENS IMPLANT Right 7/24/2017    Procedure: PHACOEMULSIFICATION CLEAR CORNEA WITH TORIC INTRAOCULAR LENS IMPLANT;  RIGHT  PHACOEMULSIFICATION CLEAR CORNEA WITH TORIC INTRAOCULAR LENS IMPLANT ;  Surgeon: Yoel Calero MD;  Location: [facility]     PHACOEMULSIFICATION CLEAR CORNEA WITH TORIC INTRAOCULAR LENS IMPLANT Left 2017    Procedure: PHACOEMULSIFICATION CLEAR CORNEA WITH TORIC INTRAOCULAR LENS IMPLANT;  LEFT PHACOEMULSIFICATION CLEAR CORNEA WITH TORIC INTRAOCULAR LENS IMPLANT ;  Surgeon: Yoel Claero MD;  Location: [facility]     Social History     Tobacco Use     Smoking status: Former Smoker     Packs/day: 2.00     Years: 30.00     Pack years: 60.00     Types: Cigarettes     Last attempt to quit: 1981     Years since quittin.8     Smokeless tobacco: Never Used   Substance Use Topics     Alcohol use: No     Alcohol/week: 0.0 standard drinks     Current Outpatient Medications   Medication Sig Dispense Refill     amLODIPine (NORVASC) 2.5 MG tablet Take 1 tablet (2.5 mg) by mouth 2 times daily 180 tablet 3     calcium 600 MG tablet Take 1 tablet by mouth every other day  180 tablet 3     calcium carbonate (TUMS) 500 MG chewable tablet Take 1 chew tab by mouth daily as needed.       cephALEXin (KEFLEX) 500 MG capsule Take 1 capsule (500 mg) by mouth 3 times daily 30 capsule 0     Cholecalciferol (VITAMIN D) 2000 UNITS tablet Take 2,000 Units by mouth daily (Patient taking differently: Take 2,000 Units by mouth every other day ) 100 tablet 3     COENZYME Q-10 PO Take 100 mg by mouth every 14 days        CYANOCOBALAMIN PO Take 1,000 mcg by mouth daily.       fosinopril (MONOPRIL) 40 MG tablet take 1/2 tab by mouth in the morning and 1 tab in the evening 135 tablet 2     levETIRAcetam (KEPPRA) 500 MG tablet Take 1 tablet (500 mg) by mouth 2 times daily 60 tablet 0     metoprolol tartrate (LOPRESSOR) 50 MG tablet Take one-half tablet by mouth twice daily 90 tablet 3     prune juice LIQD Take  by mouth daily (with breakfast).       psyllium (METAMUCIL) 58.6 % POWD Take 1 teaspoonful by mouth 3 times daily as needed.  "Constipation.       vitamin B complex with vitamin C (VITAMIN  B COMPLEX) TABS tablet Take 1 tablet by mouth daily       Allergies   Allergen Reactions     Adrenalin [Epinephrine Hcl]      Hctz      hyponatremia     FAMILY HISTORY NOTED AND REVIEWED      PHYSICAL EXAM:    BP (!) 148/73 (BP Location: Right arm, Patient Position: Chair, Cuff Size: Adult Regular)   Pulse 66   Temp 97.3  F (36.3  C) (Tympanic)   Ht 1.727 m (5' 8\")   Wt 81.6 kg (180 lb)   SpO2 98%   BMI 27.37 kg/m      Patient appears non toxic  Pt has thin fragile skin  There is a skin tear that is 1cm on dorsal hand that is not bleeding or infected looking  Large skin tear ulnar side of forearm that is cleaned and redressed. There is yellow purulent appearing discharge from this area. Steri strips removed due to infection.  Skin tear over elbow dry, no purulent discharge or bleeding.    Assessment and Plan:     (L03.114) Cellulitis of left upper extremity  (primary encounter diagnosis)  Comment:  Plan: cephALEXin (KEFLEX) 500 MG capsule        TID x 10d. F/u for dressing change and recheck in 2 days. Keep dressing on until then and keep this clean and dry. New steristrip placed on small skin tear on hand    (T14.8XXA) Multiple skin tears  Comment:   Plan:     Spent 30 minutes FTF with patient of which over 50% was spent discussing the coordination of care and management of their skin tears.    Nena Ponce PA-C      "

## 2019-10-18 ENCOUNTER — OFFICE VISIT (OUTPATIENT)
Dept: FAMILY MEDICINE | Facility: CLINIC | Age: 84
End: 2019-10-18
Payer: COMMERCIAL

## 2019-10-18 VITALS
BODY MASS INDEX: 27.28 KG/M2 | HEART RATE: 73 BPM | TEMPERATURE: 97.6 F | DIASTOLIC BLOOD PRESSURE: 89 MMHG | WEIGHT: 180 LBS | OXYGEN SATURATION: 94 % | SYSTOLIC BLOOD PRESSURE: 168 MMHG | HEIGHT: 68 IN

## 2019-10-18 DIAGNOSIS — L03.114 CELLULITIS OF LEFT UPPER EXTREMITY: Primary | ICD-10-CM

## 2019-10-18 DIAGNOSIS — T14.8XXA MULTIPLE SKIN TEARS: ICD-10-CM

## 2019-10-18 PROCEDURE — 99213 OFFICE O/P EST LOW 20 MIN: CPT | Performed by: INTERNAL MEDICINE

## 2019-10-18 ASSESSMENT — MIFFLIN-ST. JEOR: SCORE: 1425.97

## 2019-10-18 NOTE — PROGRESS NOTES
Chief complaint: Follow-up skin tear  Subjective: 89-year-old man sustained a fall with laceration seen by physicians assistant in this clinic 2 days ago with repair of laceration with Steri-Strips.  Treated with Keflex for suspected associated cellulitis.  Symptoms are improving.  No fevers or chills.  Wife is a little bit overwhelmed with the prospect of having to perform daily dressing changes although she has been able to do so since his visit on the 16th.  Objective: Afebrile, blood pressure a little high but other vitals stable.  General: Well-appearing man in no acute distress not toxic.  Skin: Large skin tear on left forearm and overlying left elbow well approximated with Steri-Strips without evidence of infection.  Distal circulation sensation and motor function are intact.  Assessment: #1 cellulitis of the left upper extremity #2 multiple skin tears Plan: Continue daily dressing changes with nonstick gauze with Kerlix rolled over.  Complete course of Keflex as prescribed.  Referral to wound care clinic to assist wife with dressing changes, however, I think that by the time she gets an appointment with the wound care clinic, the skin tear might be well on its way to healing.  Follow-up with me in 3 weeks as previously planned or sooner as symptoms or problems dictate.

## 2019-10-23 ENCOUNTER — ALLIED HEALTH/NURSE VISIT (OUTPATIENT)
Dept: NURSING | Facility: CLINIC | Age: 84
End: 2019-10-23
Payer: COMMERCIAL

## 2019-10-23 ENCOUNTER — HOSPITAL ENCOUNTER (OUTPATIENT)
Dept: WOUND CARE | Facility: CLINIC | Age: 84
Discharge: HOME | End: 2019-10-23
Attending: SURGERY | Admitting: SURGERY
Payer: COMMERCIAL

## 2019-10-23 VITALS
RESPIRATION RATE: 16 BRPM | DIASTOLIC BLOOD PRESSURE: 94 MMHG | SYSTOLIC BLOOD PRESSURE: 155 MMHG | HEART RATE: 79 BPM | TEMPERATURE: 97.3 F

## 2019-10-23 DIAGNOSIS — S41.102A ARM WOUND, LEFT, INITIAL ENCOUNTER: ICD-10-CM

## 2019-10-23 DIAGNOSIS — Z23 NEED FOR PROPHYLACTIC VACCINATION AND INOCULATION AGAINST INFLUENZA: Primary | ICD-10-CM

## 2019-10-23 PROCEDURE — 97602 WOUND(S) CARE NON-SELECTIVE: CPT

## 2019-10-23 PROCEDURE — A6252 ABSORPT DRG >16 <=48 W/O BDR: HCPCS

## 2019-10-23 PROCEDURE — 90662 IIV NO PRSV INCREASED AG IM: CPT

## 2019-10-23 PROCEDURE — A6441 PAD BAND W>=3" <5"/YD: HCPCS

## 2019-10-23 PROCEDURE — 99207 ZZC NO CHARGE NURSE ONLY: CPT

## 2019-10-23 PROCEDURE — 99203 OFFICE O/P NEW LOW 30 MIN: CPT | Performed by: SURGERY

## 2019-10-23 PROCEDURE — A6454 SELF-ADHER BAND W>=3" <5"/YD: HCPCS

## 2019-10-23 PROCEDURE — G0008 ADMIN INFLUENZA VIRUS VAC: HCPCS

## 2019-10-23 NOTE — PROGRESS NOTES
[facility] [place] Progress Note    Subject: Ernie Leary, 89-year-old male, present with his wife, cerebrovascular accident 2 years ago, was exiting a car when he fell on his left arm resulting in skin tears to the left forearm and the left elbow, no associated fractures, medical record reviewed.  ER visit of October 14, 2019 reviewed.  No associated head trauma.  He and his wife lives independently in a care facility.  Medications reviewed, not on any antiproliferative medications, nondiabetic, does not utilize tobacco.    PMH:   Past Medical History:   Diagnosis Date     Abnormal glucose      Atrial flutter (H)     ablation 2008     Diabetes mellitus (H)     not currently being treated, diet controlled at this time     Essential hypertension, benign      Hemorrhagic stroke (H) 2012     Hyperlipidemia LDL goal <130 2/7/2011     Other and unspecified hyperlipidemia      Sebaceous cyst      Seizure disorder (H) 7/21/2017     Skin cancer     Dr. 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)     Transient cerebral ischemia     Cerebral amyloid angiopathy (H)     History of intracranial hemorrhage     TIA (transient ischemic attack)     Seizure disorder (H)     Social Hx:   Social History     Socioeconomic History     Marital status:      Spouse name: nicholas freeman     Number of children: 3     Years of education: Not on file     Highest education level: Not on file   Occupational History     Not on file   Social Needs     Financial resource  strain: Not on file     Food insecurity:     Worry: Not on file     Inability: Not on file     Transportation needs:     Medical: Not on file     Non-medical: Not on file   Tobacco Use     Smoking status: Former Smoker     Packs/day: 2.00     Years: 30.00     Pack years: 60.00     Types: Cigarettes     Last attempt to quit: 1981     Years since quittin.8     Smokeless tobacco: Never Used   Substance and Sexual Activity     Alcohol use: No     Alcohol/week: 0.0 standard drinks     Drug use: No     Sexual activity: Not Currently     Partners: Female   Lifestyle     Physical activity:     Days per week: Not on file     Minutes per session: Not on file     Stress: Not on file   Relationships     Social connections:     Talks on phone: Not on file     Gets together: Not on file     Attends Quaker service: Not on file     Active member of club or organization: Not on file     Attends meetings of clubs or organizations: Not on file     Relationship status: Not on file     Intimate partner violence:     Fear of current or ex partner: Not on file     Emotionally abused: Not on file     Physically abused: Not on file     Forced sexual activity: Not on file   Other Topics Concern      Service Not Asked     Blood Transfusions Not Asked     Caffeine Concern Not Asked     Occupational Exposure Not Asked     Hobby Hazards Yes     Comment: yard work     Sleep Concern Not Asked     Stress Concern No     Weight Concern No     Special Diet Yes     Back Care Not Asked     Exercise No     Bike Helmet Not Asked     Seat Belt Not Asked     Self-Exams Not Asked     Parent/sibling w/ CABG, MI or angioplasty before 65F 55M? Not Asked   Social History Narrative     to Alexsandra Carey       Surgical Hx:   Past Surgical History:   Procedure Laterality Date     ------------OTHER-------------  2008    Cardiac ablation     ADJACENT TISSUE TRANSFER, FOREHEAD/CHEEKS/CHIN/MOUTH/NECK/AXILLAE/GENITALIA/HANDS/FEET, < OR = 10CM        ARTHROTOMY SHOULDER, ROTATOR CUFF REPAIR, COMBINED  11/18/2011    Procedure:COMBINED ARTHROTOMY SHOULDER, ROTATOR CUFF REPAIR; LEFT SHOULDER OPEN ROTATOR CUFF REPAIR, BURSECTOMY, DISTAL CLAVICLE RESECTION; Surgeon:KHALIDA ZABALA; Location: OR     BIOPSY      multiple skin biopsies for skin cancers     CARDIAC SURGERY       CYSTOSCOPY, TRANSURETHRAL RESECTION (TUR) PROSTATE, COMBINED  8/15/2012    Procedure: COMBINED CYSTOSCOPY, TRANSURETHRAL RESECTION (TUR) PROSTATE;  CYSTOSCOPY, TRANSURETHRAL RESECTION  OF  PROSTATE WITH PLASMA LOOP;  Surgeon: Harmeet Kilgore MD;  Location:  OR     HERNIORRHAPHY INGUINAL Right 5/29/2015    Procedure: HERNIORRHAPHY INGUINAL;  Surgeon: Scottie Nunn MD;  Location:  SD     PHACOEMULSIFICATION CLEAR CORNEA WITH TORIC INTRAOCULAR LENS IMPLANT Right 7/24/2017    Procedure: PHACOEMULSIFICATION CLEAR CORNEA WITH TORIC INTRAOCULAR LENS IMPLANT;  RIGHT PHACOEMULSIFICATION CLEAR CORNEA WITH TORIC INTRAOCULAR LENS IMPLANT ;  Surgeon: Yoel Calero MD;  Location:  [facility]     PHACOEMULSIFICATION CLEAR CORNEA WITH TORIC INTRAOCULAR LENS IMPLANT Left 8/21/2017    Procedure: PHACOEMULSIFICATION CLEAR CORNEA WITH TORIC INTRAOCULAR LENS IMPLANT;  LEFT PHACOEMULSIFICATION CLEAR CORNEA WITH TORIC INTRAOCULAR LENS IMPLANT ;  Surgeon: Yoel Calero MD;  Location:  [facility]       Allergies:    Allergies   Allergen Reactions     Adrenalin [Epinephrine Hcl]      Hctz      hyponatremia       Medications:   Current Outpatient Medications   Medication     amLODIPine (NORVASC) 2.5 MG tablet     calcium 600 MG tablet     calcium carbonate (TUMS) 500 MG chewable tablet     cephALEXin (KEFLEX) 500 MG capsule     Cholecalciferol (VITAMIN D) 2000 UNITS tablet     COENZYME Q-10 PO     CYANOCOBALAMIN PO     fosinopril (MONOPRIL) 40 MG tablet     levETIRAcetam (KEPPRA) 500 MG tablet     metoprolol tartrate (LOPRESSOR) 50 MG tablet     prune juice LIQD     psyllium (METAMUCIL) 58.6 % POWD     vitamin  B complex with vitamin C (VITAMIN  B COMPLEX) TABS tablet     No current facility-administered medications for this encounter.        Labs:   Recent Labs   Lab Test 05/10/19  1116 06/26/18  0815  07/12/13  1645 07/12/13  1150   ALBUMIN  --  4.0   < >  --   --    HGB 13.8 13.8   < >  --  12.2*   INR  --   --   --   --  1.01   WBC 7.5 6.5   < >  --  6.5   A1C 6.1* 6.3*   < >  --   --    CRP  --   --   --  0.9  --     < > = values in this interval not displayed.     Creatinine   Date Value Ref Range Status   05/10/2019 1.05 0.66 - 1.25 mg/dL Final     GFR Estimate   Date Value Ref Range Status   05/10/2019 60 (L) >60 mL/min/[1.73_m2] Final     Comment:     Non  GFR Calc  Starting 12/18/2018, serum creatinine based estimated GFR (eGFR) will be   calculated using the Chronic Kidney Disease Epidemiology Collaboration   (CKD-EPI) equation.       GFR Estimate If Black   Date Value Ref Range Status   05/10/2019 70 >60 mL/min/[1.73_m2] Final     Comment:      GFR Calc  Starting 12/18/2018, serum creatinine based estimated GFR (eGFR) will be   calculated using the Chronic Kidney Disease Epidemiology Collaboration   (CKD-EPI) equation.       Lab Results   Component Value Date    WBC 7.5 05/10/2019     Lab Results   Component Value Date    RBC 4.45 05/10/2019     Lab Results   Component Value Date    HGB 13.8 05/10/2019     Lab Results   Component Value Date    HCT 41.0 05/10/2019     No components found for: MCT  Lab Results   Component Value Date    MCV 92 05/10/2019     Lab Results   Component Value Date    MCH 31.0 05/10/2019     Lab Results   Component Value Date    MCHC 33.7 05/10/2019     Lab Results   Component Value Date    RDW 13.5 05/10/2019     Lab Results   Component Value Date     05/10/2019          Nutrition requirements were discussed with patient today.  Objective:  BP (!) 155/94 (BP Location: Right arm)   Pulse 79   Temp 97.3  F (36.3  C) (Temporal)   Resp 16   Wound  (used by OP [facility] only) 10/23/19 0850 Left wrist trauma (Active)   Length (cm) 3.5 10/23/2019  9:00 AM   Width (cm) 1.6 10/23/2019  9:00 AM   Depth (cm) 0.2 10/23/2019  9:00 AM   Wound (cm^2) 5.6 cm^2 10/23/2019  9:00 AM   Wound Volume (cm^3) 1.12 cm^3 10/23/2019  9:00 AM   Dressing Appearance moist drainage 10/23/2019  9:00 AM   Drainage Characteristics/Odor serosanguineous 10/23/2019  9:00 AM   Drainage Amount moderate 10/23/2019  9:00 AM   Thickness/Stage full thickness 10/23/2019  9:00 AM   Base slough 10/23/2019  9:00 AM   Periwound intact 10/23/2019  9:00 AM   Periwound Temperature warm 10/23/2019  9:00 AM   Periwound Skin Turgor soft 10/23/2019  9:00 AM   Edges open 10/23/2019  9:00 AM   Care, Wound non-select wound debridement performed 10/23/2019  9:00 AM        General:  Patient is alert and orientated, no acute distress.  Markedly atrophic skin upper extremities and lower extremities.  No trauma to the right upper extremity.  Palpable left radial pulse.  Skin tear at the forearm and at the elbow, all Steri-Strips removed, no cellulitis.  Wound will be irrigated with prophase then Plurogel applied, Spandage, 2 layer wrap, replaced in a weekly basis.    Vascular: Palpable left radial pulse      Impression: 89-year-old male with skin tears left upper extremity status post fall and evaluation in the emergency room  Barriers to healing include: Atrophic skin and 89-year-old male    Plan:  We will dress the wounds with Plurogel, Spandage, 2 layer wrap, change weekly.  Patient will return to the clinic in 1 weeks time.     Bertin Lynch MD on 10/23/2019 at 9:21 AM

## 2019-10-23 NOTE — DISCHARGE INSTRUCTIONS
[facility]  [address] [facility] [address], [place] [address]  Appointment Phone [phone] Nurse Advisors [phone]    Ernie Leary      9/23/1924    Keep leg dry with use of a cast protector (available at [employer] or [employer])  Wound Dressing Change: Left Lateral Arm and Left Elbow  Cleanse wound with prophase  Skin Care: Apply moisturizing cream to skin surrounding the wound (but not in the wound):critic  Cover wound Plurogel than Acticoat 7 cut to fit the size of the wound   Spandage  Than wrap is a 2 layer coflex wrap and should stay on until next week.     Please remove compression dressing if toes turn blue and/or tingle and can not be relieved by raising the leg for one hour.      TAMIKA Lynch M.D.. October 23, 2019  Call us at [phone] if you have any questions about your wounds, have redness or swelling around your wound, have a fever of 101 or greater or if you have any other problems or concerns. We answer the phone Monday through Friday 8 am to 4 pm, please leave a message as we check the voicemail frequently throughout the day.   Follow up with Provider - 1 week     A diet high in protein is important for wound healing, we recommend getting *** grams of protein per day. Taking protein shakes or bars are a good way to get extra protein in your diet.     Good sources of protein:  Pork 26g per 3 oz  Whey protein powder - 24g per scoop (on average)  Greek yogurt - 23g per 8oz   Chicken or Turkey - 23g per 3oz  Fish - 20-25g per 3oz  Beef - 18-23g per 3oz  Navy beans - 20g per cup  Cottage cheese - 14g per 1/2 cup   Lentils - 13g per 1/4 cup  Beef jerky 13g per 1oz  2% milk - 8g per cup  Peanut butter - 8g per 2 tablespoons  Eggs - 6g per egg  Mixed nuts - 6g per 2oz

## 2019-10-29 ENCOUNTER — HOSPITAL ENCOUNTER (OUTPATIENT)
Dept: WOUND CARE | Facility: CLINIC | Age: 84
Discharge: HOME | End: 2019-10-29
Attending: SURGERY | Admitting: SURGERY
Payer: COMMERCIAL

## 2019-10-29 VITALS
TEMPERATURE: 97.4 F | RESPIRATION RATE: 16 BRPM | DIASTOLIC BLOOD PRESSURE: 75 MMHG | SYSTOLIC BLOOD PRESSURE: 159 MMHG | HEART RATE: 71 BPM

## 2019-10-29 DIAGNOSIS — S41.102D OPEN WOUND OF LEFT UPPER ARM, SUBSEQUENT ENCOUNTER: ICD-10-CM

## 2019-10-29 PROCEDURE — A6252 ABSORPT DRG >16 <=48 W/O BDR: HCPCS

## 2019-10-29 PROCEDURE — 97602 WOUND(S) CARE NON-SELECTIVE: CPT

## 2019-10-29 PROCEDURE — 99212 OFFICE O/P EST SF 10 MIN: CPT | Performed by: SURGERY

## 2019-10-29 PROCEDURE — A6441 PAD BAND W>=3" <5"/YD: HCPCS

## 2019-10-29 NOTE — ADDENDUM NOTE
Encounter addended by: Sarah Gonzalez RN on: 10/29/2019 2:59 PM   Actions taken: Flowsheet accepted, Visit Navigator Flowsheet section accepted, Charge Capture section accepted

## 2019-10-29 NOTE — PROGRESS NOTES
[facility] [place] Progress Note    Subject: Ernie Leary second visit for evaluation of left upper extremity wound secondary to trauma, previous ER visit October 14, 2019.  His wife is present for today's evaluation.    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 Tucker     Stroke (H)      Syncope and collapse 11/15/01     Exam:  BP (!) 159/75   Pulse 71   Temp 97.4  F (36.3  C) (Temporal)   Resp 16   Wound (used by OP [facility] only) 10/23/19 0850 Left wrist trauma (Active)   Length (cm) 3.5 10/29/2019  1:00 PM   Width (cm) 1 10/29/2019  1:00 PM   Depth (cm) 0.2 10/29/2019  1:00 PM   Wound (cm^2) 3.5 cm^2 10/29/2019  1:00 PM   Wound Volume (cm^3) 0.7 cm^3 10/29/2019  1:00 PM   Wound healing % 37.5 10/29/2019  1:00 PM   Dressing Appearance moist drainage 10/29/2019  1:00 PM   Drainage Characteristics/Odor serosanguineous 10/29/2019  1:00 PM   Drainage Amount moderate 10/29/2019  1:00 PM     89-year-old male who appears younger  than stated age.  2+ left radial pulse resolving left upper extremity ulcerations progressive epithelialization.  Early atrophic skin, therefore no debridement periwound margins, no cellulitis present.        Impression: Resolving left upper extremity traumatically induced wounds atrophic skin    Plan: We will dress the wounds with modified 2 layer wrap, Plurogel, Acticoat Spandage.  Patient will return to the clinic in 1 weeks time    Bertin Lynch MD on 10/29/2019 at 2:51 PM

## 2019-10-29 NOTE — DISCHARGE INSTRUCTIONS
[facility] [place]  [address] [facility] [address], [place] [address]  Appointment Phone [phone] Nurse Advisors [phone]    Ernie Leary      9/23/1924    Wound Dressing Change: Left Lateral Arm and Left Elbow  Cleanse wound with prophase  Cover wound Plurogel than Acticoat 7 cut to fit the size of the wound  Spandage  Padding layer of 2 layer     M. Jarrod Lynch M.D.. October 29, 2019    Call us at [phone] if you have any questions about your wounds, have redness or swelling around your wound, have a fever of 101 or greater or if you have any other problems or concerns. We answer the phone Monday through Friday 8 am to 4 pm, please leave a message as we check the voicemail frequently throughout the day.     Follow up with Provider - 1 week

## 2019-11-04 ENCOUNTER — HOSPITAL ENCOUNTER (OUTPATIENT)
Dept: WOUND CARE | Facility: CLINIC | Age: 84
Discharge: HOME | End: 2019-11-04
Attending: SURGERY | Admitting: SURGERY
Payer: COMMERCIAL

## 2019-11-04 VITALS
DIASTOLIC BLOOD PRESSURE: 83 MMHG | HEART RATE: 91 BPM | RESPIRATION RATE: 16 BRPM | TEMPERATURE: 98.5 F | SYSTOLIC BLOOD PRESSURE: 139 MMHG

## 2019-11-04 DIAGNOSIS — L85.3 DRY SKIN: Primary | ICD-10-CM

## 2019-11-04 PROCEDURE — G0463 HOSPITAL OUTPT CLINIC VISIT: HCPCS

## 2019-11-04 PROCEDURE — 99212 OFFICE O/P EST SF 10 MIN: CPT | Performed by: SURGERY

## 2019-11-04 RX ORDER — EMOLLIENT COMBINATION NO.32
1 EMULSION, EXTENDED RELEASE TOPICAL DAILY
Qty: 225 G | Refills: 11 | Status: SHIPPED | OUTPATIENT
Start: 2019-11-04 | End: 2020-07-10

## 2019-11-04 NOTE — DISCHARGE INSTRUCTIONS
[facility]  [address] [facility] [address] [address]  Appointment Phone [phone] Nurse Advisors [phone]    Ernie Leary      9/23/1924  Ok to shower and cleanse arm with soap and water in shower per normal routine.  Apply Lotion daily.  Apply Edemawear to arm and leave on until next lotion.    EpiCeram helps repair and heal the skin barrier through a unique mechanism of action not commonly found in other medications. It contains the skin s natural level of essential lipids: ceramides, cholesterol and free fatty acids, which are reduced in patients with eczema and atopic dermatitis.  EpiCeram is steroid-free, fragrance-free, noncomedogenic,paraben-free, and propylene glycol-free.   Available in a 90g tube and 225g airless pump  Please call [phone] to get this prescription         TAMIKA Lynch M.D.. November 4, 2019    Call us at [phone] if you have any questions about your wounds, have redness or swelling around your wound, have a fever of 101 or greater or if you have any other problems or concerns. We answer the phone Monday through Friday 8 am to 4 pm, please leave a message as we check the voicemail frequently throughout the day.     Follow up with Provider - if needed.

## 2019-11-04 NOTE — PROGRESS NOTES
Patient arrived for wound care visit. Certified Wound Care Nurse time spent evaluating patient record, completed a full evaluation and documented helaed wound(s) & marleen-wound skin; provided recommendation based on treatment plan. Applied dressing, reviewed discharge instructions, patient education, and discussed plan of care with appropriate medical team staff members and patient and/or family members.

## 2019-11-11 ENCOUNTER — OFFICE VISIT (OUTPATIENT)
Dept: FAMILY MEDICINE | Facility: CLINIC | Age: 84
End: 2019-11-11
Payer: COMMERCIAL

## 2019-11-11 VITALS
BODY MASS INDEX: 26.98 KG/M2 | DIASTOLIC BLOOD PRESSURE: 75 MMHG | OXYGEN SATURATION: 96 % | WEIGHT: 178 LBS | TEMPERATURE: 97 F | HEART RATE: 62 BPM | HEIGHT: 68 IN | SYSTOLIC BLOOD PRESSURE: 141 MMHG

## 2019-11-11 DIAGNOSIS — M20.019 MALLET DEFORMITY OF INDEX FINGER: ICD-10-CM

## 2019-11-11 DIAGNOSIS — L03.114 CELLULITIS OF LEFT UPPER EXTREMITY: Primary | ICD-10-CM

## 2019-11-11 DIAGNOSIS — T14.8XXA MULTIPLE SKIN TEARS: ICD-10-CM

## 2019-11-11 PROCEDURE — 99213 OFFICE O/P EST LOW 20 MIN: CPT | Performed by: INTERNAL MEDICINE

## 2019-11-11 ASSESSMENT — MIFFLIN-ST. JEOR: SCORE: 1416.9

## 2019-12-12 ENCOUNTER — TRANSFERRED RECORDS (OUTPATIENT)
Dept: HEALTH INFORMATION MANAGEMENT | Facility: CLINIC | Age: 84
End: 2019-12-12

## 2019-12-27 ENCOUNTER — TRANSFERRED RECORDS (OUTPATIENT)
Dept: HEALTH INFORMATION MANAGEMENT | Facility: CLINIC | Age: 84
End: 2019-12-27

## 2019-12-27 ENCOUNTER — NURSE TRIAGE (OUTPATIENT)
Dept: FAMILY MEDICINE | Facility: CLINIC | Age: 84
End: 2019-12-27
Payer: COMMERCIAL

## 2019-12-27 NOTE — TELEPHONE ENCOUNTER
Along with below, pt has temp 99.6 per wife.  Advised Urgent care this morning as no avail appts in area.  Wife agreed and will take pt to .  Lianna Iniguez RN

## 2019-12-27 NOTE — TELEPHONE ENCOUNTER
Reason for call:  Patient reporting a symptom    Symptom or request: red, painful testicles & penis    Duration (how long have symptoms been present): approx 12.23.19    Have you been treated for this before? No    Additional comments: Thought it was an infection. Put lotion on it but that was painful. Is able to urinate    Phone Number patient can be reached at:  Cell number on file:    Telephone Information:   Mobile [phone]     Best Time:  any    Can we leave a detailed message on this number:  YES    Call taken on 12/27/2019 at 8:06 AM by Elaine Crawford

## 2020-01-06 NOTE — PROGRESS NOTES
[facility] [place] Progress Note    Subject: Ernie Leary returns for evaluation of arm wound status post fall, resolved with use of Plurogel on EdemaWear, micronutrients.    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 Tucker     Stroke (H)      Syncope and collapse 11/15/01     Exam:  /83 (BP Location: Right arm)   Pulse 91   Temp 98.5  F (36.9  C) (Temporal)   Resp 16      89-year-old male, nondistressed.  Resolved, closed left upper extremity wound, original consultation October 23, 2019    Impression: Closed left upper extremity wound    Plan: Hydrating cream, follow-up as needed.    Bertin Lynch MD on 1/6/2020 at 8:42 AM

## 2020-02-11 ENCOUNTER — TRANSFERRED RECORDS (OUTPATIENT)
Dept: HEALTH INFORMATION MANAGEMENT | Facility: CLINIC | Age: 85
End: 2020-02-11

## 2020-02-27 ENCOUNTER — HOSPITAL ENCOUNTER (OUTPATIENT)
Dept: PHYSICAL THERAPY | Facility: CLINIC | Age: 85
Setting detail: THERAPIES SERIES
End: 2020-02-27
Attending: PSYCHIATRY & NEUROLOGY
Payer: COMMERCIAL

## 2020-02-27 DIAGNOSIS — I10 ESSENTIAL HYPERTENSION, BENIGN: ICD-10-CM

## 2020-02-27 PROCEDURE — 97162 PT EVAL MOD COMPLEX 30 MIN: CPT | Mod: GP | Performed by: PHYSICAL THERAPIST

## 2020-02-27 PROCEDURE — 97112 NEUROMUSCULAR REEDUCATION: CPT | Mod: GP | Performed by: PHYSICAL THERAPIST

## 2020-02-27 ASSESSMENT — 6 MINUTE WALK TEST (6MWT): TOTAL DISTANCE WALKED (FT): 720

## 2020-02-27 NOTE — PROGRESS NOTES
02/27/20 0800   Quick Adds   Type of Visit Initial OP PT Evaluation   General Information   Start of Care Date 02/27/20   Referring Physician Tanner Alcantar MD   Orders Evaluate and Treat as Indicated   Order Date 02/19/20   Medical Diagnosis Disbalance   Onset of illness/injury or Date of Surgery 02/19/20   Precautions/Limitations seizure precautions   Surgical/Medical history reviewed Yes   Pertinent history of current problem (include personal factors and/or comorbidities that impact the POC) Pt is a 89yo male who is referred to physical therapy with the diagnosis of disbalance. Pt had L sides numbness and headache associated with hypertension in Nov 2012 and was found to have a R parietal posterior frontal parenchymal hemorrhage with associated edema.  Persistent L sided hemiplegia and headache slowly improved.  MRI also demonstrated probable amyloid angiopathy.  Pt was discharged to [facility] for ongoing care after acute rehab.  Pt had a spell in July 2013 of possible seizure and was hospitalized on 6/26/17 with a probable small seizure wit L sided tingling and L hand cramping.  His wife noted that his hand seized up and his whole body started shaking.  Pt is socially isolated and his wife is frustrated that the patient does not want to do anything.  He now reports some disbalance.  PMH includes Intracerebral hemorrhage, HTN, A-fib, and seizure.     Prior level of function comment did not use an AD   Patient role/Employment history Retired   Living environment Apartment/condo   Current Assistive Devices Front Wheeled Walker;Four Wheeled Walker;Standard Cane   ADL Devices Shower/Tub Grab Bar   Assistive Devices Comments Started using walker 4-5 months ago   Patient/Family Goals Statement wants some exercise and to walk better   General Information Comments someone comes to assist him with shower   Fall Risk Screen   Fall screen completed by PT   Have you fallen 2 or more times in the past year? No   Have  you fallen and had an injury in the past year? Yes   Timed Up and Go score (seconds) 20.5   Is patient a fall risk? Yes   Fall screen comments 20.5sec w/o walker, 19.12 sec w/ FWW   Pain   Patient currently in pain No   Cognitive Status Examination   Orientation orientation to person, place and time   Follows Commands and Answers Questions 100% of the time   Cognitive Comment wife mentions some impairment but states that it is just his age   Range of Motion (ROM)   ROM Comment WFL   Strength   Strength Comments R hip 4/5, L hip 3+/5, R knee and ankle 4+/5, L knee 4/5, L ankle 3+/5   Transfer Skills   Transfer Comments Requires use of UEs to stand from a chair   Gait   Gait Comments Shuffling gait pattern with decreased heel strike on the L and absent heel strike on the R, Flexed posture, shortened step length   Gait Special Tests Functional Gait Assessment Score out of 30   Score out of 30 16/30   Gait Special Tests Six Minute Walk Test   Feet 720 Feet   Comments w/ FWW, speed and gait pattern consistant t/o   Balance Special Tests Modified CTSIB Conditions   Condition 1, seconds 30 Seconds   Condition 2, seconds 30 Seconds   Condition 4, seconds 30 Seconds   Condition 5, seconds 4.84 Seconds   Balance Special Tests Sit to Stand Reps in 30 Seconds   Reps in 30 seconds 8   Height standard chair   Comments w/ UE use on arms of chair   Coordination   Coordination Comments decreased coordination and speed of the L LE noted with seated toe taps   Planned Therapy Interventions   Planned Therapy Interventions balance training;ADL retraining;gait training;motor coordination training;neuromuscular re-education;strengthening;stretching;transfer training   Clinical Impression   Criteria for Skilled Therapeutic Interventions Met yes, treatment indicated   PT Diagnosis Impaired ambulation, transfers, and balance   Influenced by the following impairments Decreased strength with greater impairment on the L, impaired balance,  abnormal gait pattern   Functional limitations due to impairments limited community mobility   Clinical Presentation Evolving/Changing   Clinical Presentation Rationale has only required the use of a walker starting just over a month ago and therefore demonstrating a decline in status   Clinical Decision Making (Complexity) Moderate complexity   Therapy Frequency 2 times/Week   Predicted Duration of Therapy Intervention (days/wks) 6 weeks   Risk & Benefits of therapy have been explained Yes   Patient, Family & other staff in agreement with plan of care Yes   Clinical Impression Comments Pt has impaired ambulation, balance, and transfers.  He only reports having one fall (with injury) however he is at a high risk for falls according to testing.  He would like to improve balance in order to be able to enter the community as a walker is hard to get in and out of the car.  He has a history of a stroke that resulted in L sided weakness.    Goal 1   Goal Identifier FGA   Goal Description Pt will increase score to 20/30 to meet closest age matched norms and decrease risk of falls.   Target Date 04/27/20   Goal 2   Goal Identifier 6MWT   Goal Description Pt will increase distance to 870' to demonstrate a significant change and decrease risk of falls.   Target Date 04/27/20   Goal 3   Goal Identifier 30sec sit to stand    Goal Description Pt will be able to perform one sit to stand with no use of UEs to demonstrate an improvement in strength.   Target Date 04/27/20   Goal 4   Goal Identifier AD   Goal Description Pt will report ability to get walker into and out of car with assist from wife or will be able to saefly ambulate with a SPC in order to improve community participation.   Target Date 04/27/20   Total Evaluation Time   PT Eval, Moderate Complexity Minutes (20949) 40

## 2020-02-28 RX ORDER — FOSINOPRIL SODIUM 40 MG/1
TABLET ORAL
Qty: 135 TABLET | Refills: 0 | Status: SHIPPED | OUTPATIENT
Start: 2020-02-28 | End: 2020-05-26

## 2020-02-28 NOTE — TELEPHONE ENCOUNTER
Medication filled 1 time as pt is due for a follow-up in clinic/last BP high. Patient is already scheduled for upcoming appointment.  Shilpi THOMAS RN

## 2020-02-28 NOTE — TELEPHONE ENCOUNTER
"fosinopril (MONOPRIL) 40 MG tablet 135 tablet 2 6/17/2019       Last Written Prescription Date: 06/17/2019  Last Fill Quantity: 135,  # refills: 2   Last office visit: 11/11/2019 with prescribing provider: Pascale  Future Office Visit:   Next 5 appointments (look out 90 days)    May 11, 2020 10:30 AM CDT  Kathleen Lawson with Forest Ashraf MD  [facility] [address] [facility] [address] [phone]             Requested Prescriptions   Pending Prescriptions Disp Refills     fosinopril (MONOPRIL) 40 MG tablet [Pharmacy Med Name: FOSINOPRIL SODIUM 40 MG TAB] 135 tablet 1     Sig: TAKE 1/2 TABLET BY MOUTH IN THE MORNING AND 1 TABLET IN THE EVENING       ACE Inhibitors (Including Combos) Protocol Failed - 2/27/2020  7:29 PM        Failed - Blood pressure under 140/90 in past 12 months     BP Readings from Last 3 Encounters:   11/11/19 (!) 141/75   11/04/19 139/83   10/29/19 (!) 159/75                 Passed - Recent (12 mo) or future (30 days) visit within the authorizing provider's specialty     Patient has had an office visit with the authorizing provider or a provider within the authorizing providers department within the previous 12 mos or has a future within next 30 days. See \"Patient Info\" tab in inbasket, or \"Choose Columns\" in Meds & Orders section of the refill encounter.              Passed - Medication is active on med list        Passed - Patient is age 18 or older        Passed - Normal serum creatinine on file in past 12 months     Recent Labs   Lab Test 05/10/19  1116  11/03/12 2126   CR 1.05   < >  --    CREAT  --   --  0.9    < > = values in this interval not displayed.             Passed - Normal serum potassium on file in past 12 months     Recent Labs   Lab Test 05/10/19  1116   POTASSIUM 4.0               "

## 2020-02-29 DIAGNOSIS — I10 ESSENTIAL HYPERTENSION, BENIGN: ICD-10-CM

## 2020-02-29 NOTE — TELEPHONE ENCOUNTER
"Last Written Prescription Date:  2/28/20  Last Fill Quantity: 135 tablet,  # refills: 0   Last office visit: 11/11/2019 with prescribing provider:  Pascale   Future Office Visit:   Next 5 appointments (look out 90 days)    May 11, 2020 10:30 AM CDT  Kathleen Lawson with Forest [facility]raf [facility]  [facility] [address] [facility] [address] [phone]         Requested Prescriptions   Pending Prescriptions Disp Refills     fosinopril (MONOPRIL) 40 MG tablet [Pharmacy Med Name: FOSINOPRIL SODIUM 40 MG TAB] 135 tablet 0     Sig: TAKE 1/2 TABLET BY MOUTH IN THE MORNING AND 1 TABLET IN THE EVENING       ACE Inhibitors (Including Combos) Protocol Failed - 2/29/2020 11:18 AM        Failed - Blood pressure under 140/90 in past 12 months     BP Readings from Last 3 Encounters:   11/11/19 (!) 141/75   11/04/19 139/83   10/29/19 (!) 159/75                 Passed - Recent (12 mo) or future (30 days) visit within the authorizing provider's specialty     Patient has had an office visit with the authorizing provider or a provider within the authorizing providers department within the previous 12 mos or has a future within next 30 days. See \"Patient Info\" tab in inbasket, or \"Choose Columns\" in Meds & Orders section of the refill encounter.              Passed - Medication is active on med list        Passed - Patient is age 18 or older        Passed - Normal serum creatinine on file in past 12 months     Recent Labs   Lab Test 05/10/19  1116  11/03/12 2126   CR 1.05   < >  --    CREAT  --   --  0.9    < > = values in this interval not displayed.             Passed - Normal serum potassium on file in past 12 months     Recent Labs   Lab Test 05/10/19  1116   POTASSIUM 4.0               "

## 2020-03-03 RX ORDER — FOSINOPRIL SODIUM 40 MG/1
TABLET ORAL
Qty: 135 TABLET | Refills: 0 | OUTPATIENT
Start: 2020-03-03

## 2020-03-09 ENCOUNTER — HOSPITAL ENCOUNTER (OUTPATIENT)
Dept: PHYSICAL THERAPY | Facility: CLINIC | Age: 85
Setting detail: THERAPIES SERIES
End: 2020-03-09
Attending: PREVENTIVE MEDICINE
Payer: COMMERCIAL

## 2020-03-09 PROCEDURE — 97110 THERAPEUTIC EXERCISES: CPT | Mod: GP | Performed by: PHYSICAL THERAPIST

## 2020-03-09 PROCEDURE — 97112 NEUROMUSCULAR REEDUCATION: CPT | Mod: GP | Performed by: PHYSICAL THERAPIST

## 2020-03-12 ENCOUNTER — HOSPITAL ENCOUNTER (OUTPATIENT)
Dept: PHYSICAL THERAPY | Facility: CLINIC | Age: 85
Setting detail: THERAPIES SERIES
End: 2020-03-12
Attending: PREVENTIVE MEDICINE
Payer: COMMERCIAL

## 2020-03-12 PROCEDURE — 97112 NEUROMUSCULAR REEDUCATION: CPT | Mod: GP | Performed by: PHYSICAL THERAPIST

## 2020-03-12 PROCEDURE — 97110 THERAPEUTIC EXERCISES: CPT | Mod: GP | Performed by: PHYSICAL THERAPIST

## 2020-05-24 DIAGNOSIS — I10 ESSENTIAL HYPERTENSION, BENIGN: ICD-10-CM

## 2020-05-26 RX ORDER — FOSINOPRIL SODIUM 40 MG/1
TABLET ORAL
Qty: 135 TABLET | Refills: 0 | Status: SHIPPED | OUTPATIENT
Start: 2020-05-26 | End: 2020-08-20

## 2020-05-26 NOTE — TELEPHONE ENCOUNTER
Routing refill request to provider for review/approval because:  Labs not current, last BP high     Shilpi THOMAS RN

## 2020-07-10 ENCOUNTER — OFFICE VISIT (OUTPATIENT)
Dept: FAMILY MEDICINE | Facility: CLINIC | Age: 85
End: 2020-07-10
Payer: COMMERCIAL

## 2020-07-10 VITALS
OXYGEN SATURATION: 96 % | HEART RATE: 81 BPM | SYSTOLIC BLOOD PRESSURE: 147 MMHG | HEIGHT: 68 IN | DIASTOLIC BLOOD PRESSURE: 82 MMHG | TEMPERATURE: 97.8 F | WEIGHT: 179 LBS | BODY MASS INDEX: 27.13 KG/M2

## 2020-07-10 DIAGNOSIS — R73.01 IFG (IMPAIRED FASTING GLUCOSE): ICD-10-CM

## 2020-07-10 DIAGNOSIS — I68.0 CEREBRAL AMYLOID ANGIOPATHY (H): ICD-10-CM

## 2020-07-10 DIAGNOSIS — E78.5 HYPERLIPIDEMIA LDL GOAL <100: ICD-10-CM

## 2020-07-10 DIAGNOSIS — I10 ESSENTIAL HYPERTENSION: ICD-10-CM

## 2020-07-10 DIAGNOSIS — I48.91 ATRIAL FIBRILLATION, UNSPECIFIED TYPE (H): Primary | ICD-10-CM

## 2020-07-10 DIAGNOSIS — G40.909 SEIZURE DISORDER (H): ICD-10-CM

## 2020-07-10 DIAGNOSIS — E85.4 CEREBRAL AMYLOID ANGIOPATHY (H): ICD-10-CM

## 2020-07-10 LAB
ERYTHROCYTE [DISTWIDTH] IN BLOOD BY AUTOMATED COUNT: 13.5 % (ref 10–15)
HBA1C MFR BLD: 6.4 % (ref 0–5.6)
HCT VFR BLD AUTO: 39.9 % (ref 40–53)
HGB BLD-MCNC: 13.5 G/DL (ref 13.3–17.7)
MCH RBC QN AUTO: 30.5 PG (ref 26.5–33)
MCHC RBC AUTO-ENTMCNC: 33.8 G/DL (ref 31.5–36.5)
MCV RBC AUTO: 90 FL (ref 78–100)
PLATELET # BLD AUTO: 244 10E9/L (ref 150–450)
RBC # BLD AUTO: 4.42 10E12/L (ref 4.4–5.9)
WBC # BLD AUTO: 6.5 10E9/L (ref 4–11)

## 2020-07-10 PROCEDURE — 80048 BASIC METABOLIC PNL TOTAL CA: CPT | Performed by: INTERNAL MEDICINE

## 2020-07-10 PROCEDURE — 99214 OFFICE O/P EST MOD 30 MIN: CPT | Performed by: INTERNAL MEDICINE

## 2020-07-10 PROCEDURE — 83036 HEMOGLOBIN GLYCOSYLATED A1C: CPT | Performed by: INTERNAL MEDICINE

## 2020-07-10 PROCEDURE — 85027 COMPLETE CBC AUTOMATED: CPT | Performed by: INTERNAL MEDICINE

## 2020-07-10 PROCEDURE — 36415 COLL VENOUS BLD VENIPUNCTURE: CPT | Performed by: INTERNAL MEDICINE

## 2020-07-10 ASSESSMENT — MIFFLIN-ST. JEOR: SCORE: 1421.44

## 2020-07-10 NOTE — PROGRESS NOTES
Subjective     Ernie Leary is a 89 year old male who presents to clinic today for the following health issues:    HPI       6 month follow up      Follow up hypertension, hyperlipidemia, seizure disorder, amyloid angiopathy, atrial fibrillation, IFG    Overall, he feels well and has no new complaints  His SO states that memory continues to slowly worsen   His family is not interested in him starting Aricept for this according to his SO  His SO states that he does most of his ADLs independently, but he is getting some health bathing  She had some questions about his POLST form      Patient Active Problem List   Diagnosis     Essential hypertension, benign     Impotence of organic origin     Atrial fibrillation (H)     Normocytic anemia     Hyperlipidemia LDL goal <100     Benign prostatic hyperplasia without lower urinary tract symptoms     BPH (benign prostatic hyperplasia)     Anemia     IFG (impaired fasting glucose)     Microalbuminuria     HTN (hypertension)     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)     Transient cerebral ischemia     Cerebral amyloid angiopathy (H)     History of intracranial hemorrhage     TIA (transient ischemic attack)     Seizure disorder (H)     Past Surgical History:   Procedure Laterality Date     ------------OTHER-------------  1/1/2008    Cardiac ablation     ADJACENT TISSUE TRANSFER, FOREHEAD/CHEEKS/CHIN/MOUTH/NECK/AXILLAE/GENITALIA/HANDS/FEET, < OR = 10CM       ARTHROTOMY SHOULDER, ROTATOR CUFF REPAIR, COMBINED  11/18/2011    Procedure:COMBINED ARTHROTOMY SHOULDER, ROTATOR CUFF REPAIR; LEFT SHOULDER OPEN ROTATOR CUFF REPAIR, BURSECTOMY, DISTAL CLAVICLE RESECTION; Surgeon:KHALIDA ZABALA; Location:[facility] OR     BIOPSY      multiple skin biopsies for skin cancers     CARDIAC SURGERY       CYSTOSCOPY, TRANSURETHRAL RESECTION (TUR) PROSTATE, COMBINED  8/15/2012    Procedure: COMBINED CYSTOSCOPY, TRANSURETHRAL RESECTION (TUR) PROSTATE;  CYSTOSCOPY, TRANSURETHRAL RESECTION   OF  PROSTATE WITH PLASMA LOOP;  Surgeon: Harmeet Kilgore MD;  Location:  OR     HERNIORRHAPHY INGUINAL Right 2015    Procedure: HERNIORRHAPHY INGUINAL;  Surgeon: Scottie Nunn MD;  Location:  [facility]     PHACOEMULSIFICATION CLEAR CORNEA WITH TORIC INTRAOCULAR LENS IMPLANT Right 2017    Procedure: PHACOEMULSIFICATION CLEAR CORNEA WITH TORIC INTRAOCULAR LENS IMPLANT;  RIGHT PHACOEMULSIFICATION CLEAR CORNEA WITH TORIC INTRAOCULAR LENS IMPLANT ;  Surgeon: Yoel Calero MD;  Location:  [facility]     PHACOEMULSIFICATION CLEAR CORNEA WITH TORIC INTRAOCULAR LENS IMPLANT Left 2017    Procedure: PHACOEMULSIFICATION CLEAR CORNEA WITH TORIC INTRAOCULAR LENS IMPLANT;  LEFT PHACOEMULSIFICATION CLEAR CORNEA WITH TORIC INTRAOCULAR LENS IMPLANT ;  Surgeon: Yoel Calero MD;  Location: [facility]       Social History     Tobacco Use     Smoking status: Former Smoker     Packs/day: 2.00     Years: 30.00     Pack years: 60.00     Types: Cigarettes     Last attempt to quit: 1981     Years since quittin.5     Smokeless tobacco: Never Used   Substance Use Topics     Alcohol use: No     Alcohol/week: 0.0 standard drinks     No family history on file.      Current Outpatient Medications   Medication Sig Dispense Refill     amLODIPine (NORVASC) 2.5 MG tablet Take 1 tablet (2.5 mg) by mouth 2 times daily 180 tablet 3     calcium 600 MG tablet Take 1 tablet by mouth every other day  180 tablet 3     calcium carbonate (TUMS) 500 MG chewable tablet Take 1 chew tab by mouth daily as needed.       Cholecalciferol (VITAMIN D) 2000 UNITS tablet Take 2,000 Units by mouth daily (Patient taking differently: Take 2,000 Units by mouth every other day ) 100 tablet 3     COENZYME Q-10 PO Take 100 mg by mouth every 14 days        CYANOCOBALAMIN PO Take 1,000 mcg by mouth daily.       fosinopril (MONOPRIL) 40 MG tablet TAKE 1/2 TABLET BY MOUTH IN THE MORNING AND 1 TABLET IN THE EVENING 135 tablet 0     levETIRAcetam (KEPPRA) 500 MG  "tablet Take 1 tablet (500 mg) by mouth 2 times daily 60 tablet 0     metoprolol tartrate (LOPRESSOR) 50 MG tablet Take one-half tablet by mouth twice daily 90 tablet 3     prune juice LIQD Take  by mouth daily (with breakfast).       psyllium (METAMUCIL) 58.6 % POWD Take 1 teaspoonful by mouth 3 times daily as needed. Constipation.       vitamin B complex with vitamin C (VITAMIN  B COMPLEX) TABS tablet Take 1 tablet by mouth daily       Allergies   Allergen Reactions     Adrenalin [Epinephrine Hcl]      Hctz      hyponatremia       Reviewed and updated as needed this visit by Provider         Review of Systems   Constitutional, HEENT, cardiovascular, pulmonary, gi and gu systems are negative, except as otherwise noted.      Objective    BP (!) 147/82 (Patient Position: Sitting)   Pulse 81   Temp 97.8  F (36.6  C) (Temporal)   Ht 1.727 m (5' 8\")   Wt 81.2 kg (179 lb)   SpO2 96%   BMI 27.22 kg/m    Body mass index is 27.22 kg/m .  Physical Exam   GENERAL: healthy, alert and no distress  NECK: no adenopathy, no asymmetry, masses, or scars and thyroid normal to palpation  RESP: lungs clear to auscultation - no rales, rhonchi or wheezes  CV: The heart has an irregularly irregular rhythm with a normal rate.   ABDOMEN: soft, nontender, no hepatosplenomegaly, no masses and bowel sounds normal  MS: no gross musculoskeletal defects noted, no edema  NEURO:  Memory deficits are present, moves all extremities, walks with a walker   PSYCH: bright affect, well groomed, normal speech     Labs pending         Assessment & Plan     1. Atrial fibrillation, unspecified type (H)  Not a candidate for anticoagulation due to history of cerebral hemorrhage and risk for hemorrhage associated with CAA; hear rate control OK    2. Seizure disorder (H)  Stable     3. Cerebral amyloid angiopathy (H)  See discussion above regarding anticoagulation     4. Essential hypertension  OK control given his age   - CBC with platelets  - Basic " "metabolic panel    5. Hyperlipidemia LDL goal <100  Not on statin therapy, we discussed the utility of ongoing cholesterol monitoring and opted against checking      6. IFG (impaired fasting glucose)  - Hemoglobin A1c     BMI:   Estimated body mass index is 27.22 kg/m  as calculated from the following:    Height as of this encounter: 1.727 m (5' 8\").    Weight as of this encounter: 81.2 kg (179 lb).   Weight management plan: Discussed healthy diet and exercise guidelines    I answered some questions about a POLST form with his significant other  She would like to take some time to consider how to fill in the form according to Ernie's wishes  I told her she could schedule an appointment for the express purpose of filling out that form together if desires    Return in about 6 months (around 1/10/2021).    Forest Ashraf MD  [facility]    Total face to face contact time was greater than 25 minutes of which more than 50% of this time was spent counseling and coordinating care regarding the above topics.    "

## 2020-07-11 LAB
ANION GAP SERPL CALCULATED.3IONS-SCNC: 6 MMOL/L (ref 3–14)
BUN SERPL-MCNC: 16 MG/DL (ref 7–30)
CALCIUM SERPL-MCNC: 8.8 MG/DL (ref 8.5–10.1)
CHLORIDE SERPL-SCNC: 100 MMOL/L (ref 94–109)
CO2 SERPL-SCNC: 28 MMOL/L (ref 20–32)
CREAT SERPL-MCNC: 0.99 MG/DL (ref 0.66–1.25)
GFR SERPL CREATININE-BSD FRML MDRD: 64 ML/MIN/{1.73_M2}
GLUCOSE SERPL-MCNC: 99 MG/DL (ref 70–99)
POTASSIUM SERPL-SCNC: 4.4 MMOL/L (ref 3.4–5.3)
SODIUM SERPL-SCNC: 134 MMOL/L (ref 133–144)

## 2020-07-11 NOTE — RESULT ENCOUNTER NOTE
The following letter pertains to your most recent diagnostic tests:    -Kidney function is normal for you (Creatinine, GFR), Sodium is normal for you, Potassium is normal for you, Calcium is normal for you, Glucose (blood sugar) is normal for you.     -Your complete blood counts including your hemoglobin returned normal for you.       -Your hemoglobin A1c test which is a diabetes blood test that represents and average of your blood sugars over the last 3 months returned at 6.4 which is at your goal of hemoglobin A1c less than 8.       Bottom line:  Lab results look excellent.        Follow up:  Office visit appointment in 6 months or return sooner as needed.        Sincerely,    Dr. Ashraf

## 2020-08-17 DIAGNOSIS — I10 ESSENTIAL HYPERTENSION, BENIGN: ICD-10-CM

## 2020-08-19 DIAGNOSIS — I10 ESSENTIAL HYPERTENSION, BENIGN: ICD-10-CM

## 2020-08-19 RX ORDER — AMLODIPINE BESYLATE 2.5 MG/1
TABLET ORAL
Qty: 180 TABLET | Refills: 1 | Status: SHIPPED | OUTPATIENT
Start: 2020-08-19 | End: 2021-02-15

## 2020-08-19 RX ORDER — METOPROLOL TARTRATE 50 MG
TABLET ORAL
Qty: 90 TABLET | Refills: 1 | Status: SHIPPED | OUTPATIENT
Start: 2020-08-19 | End: 2021-02-15

## 2020-08-20 RX ORDER — FOSINOPRIL SODIUM 40 MG/1
TABLET ORAL
Qty: 135 TABLET | Refills: 3 | Status: SHIPPED | OUTPATIENT
Start: 2020-08-20 | End: 2021-07-20

## 2020-08-20 NOTE — TELEPHONE ENCOUNTER
Routing refill request to provider for review/approval because:  BP out of range.  Please authorize if appropriate.  Thanks,  Stormy Mena RN     Return in about 6 months (around 1/10/2021).

## 2020-09-22 ENCOUNTER — TRANSFERRED RECORDS (OUTPATIENT)
Dept: HEALTH INFORMATION MANAGEMENT | Facility: CLINIC | Age: 85
End: 2020-09-22

## 2020-11-25 ENCOUNTER — TRANSFERRED RECORDS (OUTPATIENT)
Dept: HEALTH INFORMATION MANAGEMENT | Facility: CLINIC | Age: 85
End: 2020-11-25

## 2021-01-15 ENCOUNTER — HEALTH MAINTENANCE LETTER (OUTPATIENT)
Age: 86
End: 2021-01-15

## 2021-01-29 ENCOUNTER — TRANSFERRED RECORDS (OUTPATIENT)
Dept: HEALTH INFORMATION MANAGEMENT | Facility: CLINIC | Age: 86
End: 2021-01-29

## 2021-02-11 DIAGNOSIS — I10 ESSENTIAL HYPERTENSION, BENIGN: ICD-10-CM

## 2021-02-15 RX ORDER — AMLODIPINE BESYLATE 2.5 MG/1
TABLET ORAL
Qty: 180 TABLET | Refills: 1 | Status: SHIPPED | OUTPATIENT
Start: 2021-02-15 | End: 2021-08-30

## 2021-02-15 RX ORDER — METOPROLOL TARTRATE 50 MG
TABLET ORAL
Qty: 90 TABLET | Refills: 1 | Status: SHIPPED | OUTPATIENT
Start: 2021-02-15 | End: 2021-07-20

## 2021-02-15 NOTE — TELEPHONE ENCOUNTER
BP Readings from Last 3 Encounters:   07/10/20 (!) 147/82   11/11/19 (!) 141/75   11/04/19 139/83     Routing refill request to provider for review/approval because:  BP high

## 2021-03-04 ENCOUNTER — TELEPHONE (OUTPATIENT)
Dept: FAMILY MEDICINE | Facility: CLINIC | Age: 86
End: 2021-03-04

## 2021-03-04 NOTE — TELEPHONE ENCOUNTER
Reason for Call:  Other call back    Detailed comments: kerry from [facility] is calling because she needs patient history, physical, medication list. Please fax [phone].     Phone Number Patient can be reached at: Other phone number:  [phone]*    Best Time: any    Can we leave a detailed message on this number? YES    Call taken on 3/4/2021 at 8:42 AM by Avelina Garza

## 2021-03-04 NOTE — TELEPHONE ENCOUNTER
Faxed last OV, PHY, and pts med list to [phone] attn: kerry Castro, CMA on 3/4/2021 at 9:09 AM

## 2021-03-18 ENCOUNTER — OFFICE VISIT (OUTPATIENT)
Dept: FAMILY MEDICINE | Facility: CLINIC | Age: 86
End: 2021-03-18
Payer: COMMERCIAL

## 2021-03-18 VITALS
OXYGEN SATURATION: 96 % | TEMPERATURE: 98.4 F | DIASTOLIC BLOOD PRESSURE: 87 MMHG | HEIGHT: 66 IN | HEART RATE: 67 BPM | SYSTOLIC BLOOD PRESSURE: 145 MMHG | BODY MASS INDEX: 29.25 KG/M2 | WEIGHT: 182 LBS

## 2021-03-18 DIAGNOSIS — I48.91 ATRIAL FIBRILLATION, UNSPECIFIED TYPE (H): Primary | ICD-10-CM

## 2021-03-18 DIAGNOSIS — I10 ESSENTIAL HYPERTENSION: ICD-10-CM

## 2021-03-18 DIAGNOSIS — I68.0 CEREBRAL AMYLOID ANGIOPATHY (H): ICD-10-CM

## 2021-03-18 DIAGNOSIS — N40.0 BENIGN PROSTATIC HYPERPLASIA WITHOUT LOWER URINARY TRACT SYMPTOMS: ICD-10-CM

## 2021-03-18 DIAGNOSIS — E85.4 CEREBRAL AMYLOID ANGIOPATHY (H): ICD-10-CM

## 2021-03-18 DIAGNOSIS — G40.909 SEIZURE DISORDER (H): ICD-10-CM

## 2021-03-18 DIAGNOSIS — R73.01 IFG (IMPAIRED FASTING GLUCOSE): ICD-10-CM

## 2021-03-18 LAB
ERYTHROCYTE [DISTWIDTH] IN BLOOD BY AUTOMATED COUNT: 13.7 % (ref 10–15)
HBA1C MFR BLD: 6.8 % (ref 0–5.6)
HCT VFR BLD AUTO: 40.4 % (ref 40–53)
HGB BLD-MCNC: 13.5 G/DL (ref 13.3–17.7)
MCH RBC QN AUTO: 30.1 PG (ref 26.5–33)
MCHC RBC AUTO-ENTMCNC: 33.4 G/DL (ref 31.5–36.5)
MCV RBC AUTO: 90 FL (ref 78–100)
PLATELET # BLD AUTO: 239 10E9/L (ref 150–450)
RBC # BLD AUTO: 4.49 10E12/L (ref 4.4–5.9)
WBC # BLD AUTO: 7.5 10E9/L (ref 4–11)

## 2021-03-18 PROCEDURE — 80048 BASIC METABOLIC PNL TOTAL CA: CPT | Performed by: INTERNAL MEDICINE

## 2021-03-18 PROCEDURE — 83036 HEMOGLOBIN GLYCOSYLATED A1C: CPT | Performed by: INTERNAL MEDICINE

## 2021-03-18 PROCEDURE — 99214 OFFICE O/P EST MOD 30 MIN: CPT | Performed by: INTERNAL MEDICINE

## 2021-03-18 PROCEDURE — 36415 COLL VENOUS BLD VENIPUNCTURE: CPT | Performed by: INTERNAL MEDICINE

## 2021-03-18 PROCEDURE — 85027 COMPLETE CBC AUTOMATED: CPT | Performed by: INTERNAL MEDICINE

## 2021-03-18 ASSESSMENT — MIFFLIN-ST. JEOR: SCORE: 1394.33

## 2021-03-18 NOTE — LETTER
March 19, 2021      Ernie Leary  [address]   [place] [address]        Dear ,    We are writing to inform you of your test results.    The following letter pertains to your most recent diagnostic tests:     -Kidney function is normal for you (Creatinine, GFR), Sodium is normal for you, Potassium is normal for you, Calcium is normal for you, Glucose (blood sugar) is normal for you.     -Your complete blood counts including your hemoglobin returned normal for you.       -Your hemoglobin A1c test which is a diabetes blood test that represents and average of your blood sugars over the last 3 months returned at 6.8 which is at your goal of hemoglobin A1c less than 8.     These labs results look OK.  The hemoglobin A1c is up a bit, so try to walk and be active as much as you can to address this.  Also, watch the starches and sugars in your diet to improve this.  We can recheck in 6 months.           Sincerely,     Dr. Ashraf     Resulted Orders   CBC with platelets   Result Value Ref Range    WBC 7.5 4.0 - 11.0 10e9/L    RBC Count 4.49 4.4 - 5.9 10e12/L    Hemoglobin 13.5 13.3 - 17.7 g/dL    Hematocrit 40.4 40.0 - 53.0 %    MCV 90 78 - 100 fl    MCH 30.1 26.5 - 33.0 pg    MCHC 33.4 31.5 - 36.5 g/dL    RDW 13.7 10.0 - 15.0 %    Platelet Count 239 150 - 450 10e9/L   Basic metabolic panel   Result Value Ref Range    Sodium 132 (L) 133 - 144 mmol/L    Potassium 4.4 3.4 - 5.3 mmol/L    Chloride 102 94 - 109 mmol/L    Carbon Dioxide 27 20 - 32 mmol/L    Anion Gap 3 3 - 14 mmol/L    Glucose 101 (H) 70 - 99 mg/dL    Urea Nitrogen 19 7 - 30 mg/dL    Creatinine 1.02 0.66 - 1.25 mg/dL    GFR Estimate 62 >60 mL/min/[1.73_m2]      Comment:      Non  GFR Calc  Starting 12/18/2018, serum creatinine based estimated GFR (eGFR) will be   calculated using the Chronic Kidney Disease Epidemiology Collaboration   (CKD-EPI) equation.      GFR Estimate If Black 71 >60 mL/min/[1.73_m2]      Comment:        GFR Calc  Starting 12/18/2018, serum creatinine based estimated GFR (eGFR) will be   calculated using the Chronic Kidney Disease Epidemiology Collaboration   (CKD-EPI) equation.      Calcium 8.7 8.5 - 10.1 mg/dL   Hemoglobin A1c   Result Value Ref Range    Hemoglobin A1C 6.8 (H) 0 - 5.6 %      Comment:      Normal <5.7% Prediabetes 5.7-6.4%  Diabetes 6.5% or higher - adopted from ADA   consensus guidelines.         If you have any questions or concerns, please call the clinic at the number listed above.       Sincerely,      Forest Ashraf MD

## 2021-03-18 NOTE — PROGRESS NOTES
"    Assessment & Plan     Atrial fibrillation, unspecified type (H)  Not a candidate for anticoagulation due to history of cerebral hemorrhage and risk for hemorrhage associated with CAA; hear rate control OK    Cerebral amyloid angiopathy (H)  See above    Seizure disorder (H)  Stable off anticonvulsants     Essential hypertension  Home readings are in 130's over 70 's according to wife ; I think we are OK here           15 minutes spent on the date of the encounter doing chart review, history and exam, documentation and further activities as noted above        No follow-ups on file.    Forest Ashraf MD  [facility] [place] is a 89 year old who presents for the following health issues  accompanied by his spouse:    HPI     Follow up chronic health cares    His memory is worsening but they don't want to start medications for this  No seizures, they stopped Keppra, they see Katharine Burdick from patient is limited, but wife has no specific concerns, she wishes he would walk more    Review of Systems   A 7 organ systems ROS is negative other than any pertinent positives or negatives previously stated.       Objective    BP (!) 145/87 (BP Location: Left arm, Cuff Size: Adult Regular)   Pulse 67   Temp 98.4  F (36.9  C) (Temporal)   Ht 1.67 m (5' 5.75\")   Wt 82.6 kg (182 lb)   SpO2 96%   BMI 29.60 kg/m    Body mass index is 29.6 kg/m .  Physical Exam   GENERAL: healthy, alert and no distress  RESP: lungs clear to auscultation - no rales, rhonchi or wheezes  CV: The heart has an irregularly irregular rhythm with a normal rate.   ABDOMEN: soft, nontender, no hepatosplenomegaly, no masses and bowel sounds normal  MS: no gross musculoskeletal defects noted, no edema  NEURO: Severe memory loss, walks with walker   PSYCH: flat affect unchanged, well groomed    Labs pending               "

## 2021-03-19 LAB
ANION GAP SERPL CALCULATED.3IONS-SCNC: 3 MMOL/L (ref 3–14)
BUN SERPL-MCNC: 19 MG/DL (ref 7–30)
CALCIUM SERPL-MCNC: 8.7 MG/DL (ref 8.5–10.1)
CHLORIDE SERPL-SCNC: 102 MMOL/L (ref 94–109)
CO2 SERPL-SCNC: 27 MMOL/L (ref 20–32)
CREAT SERPL-MCNC: 1.02 MG/DL (ref 0.66–1.25)
GFR SERPL CREATININE-BSD FRML MDRD: 62 ML/MIN/{1.73_M2}
GLUCOSE SERPL-MCNC: 101 MG/DL (ref 70–99)
POTASSIUM SERPL-SCNC: 4.4 MMOL/L (ref 3.4–5.3)
SODIUM SERPL-SCNC: 132 MMOL/L (ref 133–144)

## 2021-03-19 NOTE — RESULT ENCOUNTER NOTE
The following letter pertains to your most recent diagnostic tests:    -Kidney function is normal for you (Creatinine, GFR), Sodium is normal for you, Potassium is normal for you, Calcium is normal for you, Glucose (blood sugar) is normal for you.     -Your complete blood counts including your hemoglobin returned normal for you.       -Your hemoglobin A1c test which is a diabetes blood test that represents and average of your blood sugars over the last 3 months returned at 6.8 which is at your goal of hemoglobin A1c less than 8.     These labs results look OK.  The hemoglobin A1c is up a bit, so try to walk and be active as much as you can to address this.  Also, watch the starches and sugars in your diet to improve this.  We can recheck in 6 months.          Sincerely,    Dr. Ashraf

## 2021-04-09 ENCOUNTER — MYC MEDICAL ADVICE (OUTPATIENT)
Dept: FAMILY MEDICINE | Facility: CLINIC | Age: 86
End: 2021-04-09

## 2021-07-17 DIAGNOSIS — I10 ESSENTIAL HYPERTENSION, BENIGN: ICD-10-CM

## 2021-07-20 RX ORDER — METOPROLOL TARTRATE 50 MG
TABLET ORAL
Qty: 90 TABLET | Refills: 1 | Status: SHIPPED | OUTPATIENT
Start: 2021-07-20 | End: 2021-10-14

## 2021-07-20 RX ORDER — FOSINOPRIL SODIUM 40 MG/1
TABLET ORAL
Qty: 135 TABLET | Refills: 3 | Status: SHIPPED | OUTPATIENT
Start: 2021-07-20 | End: 2021-09-13

## 2021-07-20 NOTE — TELEPHONE ENCOUNTER
Routing refill request to provider for review/approval because:  BP Readings from Last 3 Encounters:   03/18/21 (!) 145/87   07/10/20 (!) 147/82   11/11/19 (!) 141/75     BP high

## 2021-07-20 NOTE — TELEPHONE ENCOUNTER
BP Readings from Last 3 Encounters:   03/18/21 (!) 145/87   07/10/20 (!) 147/82   11/11/19 (!) 141/75     Routing refill request to provider for review/approval because:  BP high

## 2021-08-28 DIAGNOSIS — I10 ESSENTIAL HYPERTENSION, BENIGN: ICD-10-CM

## 2021-08-30 RX ORDER — AMLODIPINE BESYLATE 2.5 MG/1
TABLET ORAL
Qty: 180 TABLET | Refills: 1 | Status: SHIPPED | OUTPATIENT
Start: 2021-08-30 | End: 2022-01-01

## 2021-08-30 NOTE — TELEPHONE ENCOUNTER
Routing refill request to provider for review/approval because:  Labs out of range:    BP Readings from Last 3 Encounters:   03/18/21 (!) 145/87   07/10/20 (!) 147/82   11/11/19 (!) 141/75     Last office vist: 3/18/2021    Pended 90 day supply & 1 refills. Please Review.    Next office visit: 9/13/2021    Allison Chan RN  [facility]

## 2021-09-02 ENCOUNTER — TRANSFERRED RECORDS (OUTPATIENT)
Dept: HEALTH INFORMATION MANAGEMENT | Facility: CLINIC | Age: 86
End: 2021-09-02

## 2021-09-10 ENCOUNTER — LAB (OUTPATIENT)
Dept: LAB | Facility: CLINIC | Age: 86
End: 2021-09-10
Payer: COMMERCIAL

## 2021-09-10 DIAGNOSIS — R73.01 IFG (IMPAIRED FASTING GLUCOSE): ICD-10-CM

## 2021-09-10 LAB — HBA1C MFR BLD: 6.4 % (ref 0–5.6)

## 2021-09-10 PROCEDURE — 83036 HEMOGLOBIN GLYCOSYLATED A1C: CPT

## 2021-09-10 PROCEDURE — 36415 COLL VENOUS BLD VENIPUNCTURE: CPT

## 2021-09-10 NOTE — RESULT ENCOUNTER NOTE
The following letter pertains to your most recent diagnostic tests:    -Your hemoglobin A1c test which is a diabetes blood test that represents and average of your blood sugars over the last 3 months returned at 6.4 which is at your goal of hemoglobin A1c less than 8.       Sincerely,    Dr. Ashraf

## 2021-09-10 NOTE — LETTER
September 10, 2021      Ernei Leary  [address]   [place] [address]        Dear ,    We are writing to inform you of your test results.    The following letter pertains to your most recent diagnostic tests:     -Your hemoglobin A1c test which is a diabetes blood test that represents and average of your blood sugars over the last 3 months returned at 6.4 which is at your goal of hemoglobin A1c less than 8.       Resulted Orders   Hemoglobin A1c   Result Value Ref Range    Hemoglobin A1C 6.4 (H) 0.0 - 5.6 %      Comment:      Normal <5.7%   Prediabetes 5.7-6.4%    Diabetes 6.5% or higher     Note: Adopted from ADA consensus guidelines.       If you have any questions or concerns, please call the clinic at the number listed above.       Sincerely,      Forest Ashraf MD

## 2021-09-13 ENCOUNTER — OFFICE VISIT (OUTPATIENT)
Dept: FAMILY MEDICINE | Facility: CLINIC | Age: 86
End: 2021-09-13
Payer: COMMERCIAL

## 2021-09-13 VITALS
TEMPERATURE: 96.9 F | HEART RATE: 72 BPM | DIASTOLIC BLOOD PRESSURE: 61 MMHG | WEIGHT: 177.4 LBS | BODY MASS INDEX: 28.85 KG/M2 | SYSTOLIC BLOOD PRESSURE: 97 MMHG | OXYGEN SATURATION: 96 % | RESPIRATION RATE: 24 BRPM

## 2021-09-13 DIAGNOSIS — E85.4 CEREBRAL AMYLOID ANGIOPATHY (H): ICD-10-CM

## 2021-09-13 DIAGNOSIS — G45.9 TRANSIENT CEREBRAL ISCHEMIA, UNSPECIFIED TYPE: ICD-10-CM

## 2021-09-13 DIAGNOSIS — Z86.79 HISTORY OF INTRACRANIAL HEMORRHAGE: ICD-10-CM

## 2021-09-13 DIAGNOSIS — I68.0 CEREBRAL AMYLOID ANGIOPATHY (H): ICD-10-CM

## 2021-09-13 DIAGNOSIS — I48.91 ATRIAL FIBRILLATION, UNSPECIFIED TYPE (H): ICD-10-CM

## 2021-09-13 DIAGNOSIS — E11.9 TYPE 2 DIABETES MELLITUS WITHOUT COMPLICATION, WITHOUT LONG-TERM CURRENT USE OF INSULIN (H): ICD-10-CM

## 2021-09-13 DIAGNOSIS — E78.5 HYPERLIPIDEMIA LDL GOAL <100: ICD-10-CM

## 2021-09-13 DIAGNOSIS — I10 ESSENTIAL HYPERTENSION, BENIGN: ICD-10-CM
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
## 2021-09-13 DIAGNOSIS — G40.909 SEIZURE DISORDER (H): ICD-10-CM

## 2021-09-13 DIAGNOSIS — N40.0 BENIGN PROSTATIC HYPERPLASIA WITHOUT LOWER URINARY TRACT SYMPTOMS: ICD-10-CM

## 2021-09-13 PROCEDURE — 99214 OFFICE O/P EST MOD 30 MIN: CPT | Performed by: INTERNAL MEDICINE

## 2021-09-13 RX ORDER — FOSINOPRIL SODIUM 10 MG/1
10 TABLET ORAL 2 TIMES DAILY
Qty: 180 TABLET | Refills: 3 | Status: SHIPPED | OUTPATIENT
Start: 2021-09-13 | End: 2022-01-01

## 2021-09-13 RX ORDER — ESCITALOPRAM OXALATE 5 MG/1
5 TABLET ORAL DAILY
COMMUNITY
Start: 2021-08-28

## 2021-09-13 NOTE — PROGRESS NOTES
Assessment & Plan     Fall, initial encounter  No obvious injury  Over-treatment of blood pressure could be treatable risk factor  See blood pressure section   Continue to use walker     Type 2 diabetes mellitus without complication, without long-term current use of insulin (H)  Diet controlled; monitor; no need for aggressive control given age an comorbid problems     Atrial fibrillation, unspecified type (H)  Not a candidate for anticoagulant due to CAA and history of hemorrhage ; rate controlled     Cerebral amyloid angiopathy (H)      Seizure disorder (H)  Stable; continue follow up with Dr. Alcantar    Essential hypertension, benign  Reduce monopril from 60 mg daily to below  With short term follow up   Need to make sure blood pressure does not get too high due to history of CAA  Next step might be to stop amlodipine if blood pressure remains to low at follow up appointment   - fosinopril (MONOPRIL) 10 MG tablet; Take 1 tablet (10 mg) by mouth 2 times daily    Hyperlipidemia LDL goal <100  He is not on statin therapy, but given his age and frailty I think this is OK      History of intracranial hemorrhage  See discussion above           20 minutes spent on the date of the encounter doing chart review, history and exam, documentation and further activities per the note             Return in about 1 month (around 10/13/2021) for recheck blood pressure .  Patient instructed to return to clinic or contact us sooner if symptoms worsen or new symptoms develop.     Forest Ashraf MD  [facility] [place] is a 89 year old who presents for the following health issues  accompanied by his spouse:    HPI     6 month Follow up     He had a fall several days ago without LOC or head injury  Injured his right shoulder  He got himself up  Wife wonders is his blood pressure is too low?  History from patient is difficult due to advanced age and hearing loss   Dr. Alcantar started lexapro and wife  states it helps with anxiety and mood       Review of Systems         Objective    BP 97/61   Pulse 72   Temp 96.9  F (36.1  C) (Temporal)   Resp 24   Wt 80.5 kg (177 lb 6.4 oz)   SpO2 96%   BMI 28.85 kg/m    Body mass index is 28.85 kg/m .     Standing blood pressure reading about the same  Physical Exam   GENERAL: frail elderly man very hard of hearing   HEENT:   Scalp atraumatic and normocephalic  RESP: lungs clear to auscultation - no rales, rhonchi or wheezes  CV: The heart has an irregularly irregular rhythm with a normal rate.   ABDOMEN: soft, nontender, no hepatosplenomegaly, no masses and bowel sounds normal  MS: small bruise on right shoulder, full shoulder joint ROM and muscles strength of right rotator cuff intact   NEURO:  Very hard of hearing, follows commands, no focal weakness

## 2021-10-14 ENCOUNTER — OFFICE VISIT (OUTPATIENT)
Dept: FAMILY MEDICINE | Facility: CLINIC | Age: 86
End: 2021-10-14
Payer: COMMERCIAL

## 2021-10-14 VITALS
WEIGHT: 176 LBS | SYSTOLIC BLOOD PRESSURE: 137 MMHG | RESPIRATION RATE: 14 BRPM | DIASTOLIC BLOOD PRESSURE: 77 MMHG | OXYGEN SATURATION: 96 % | HEIGHT: 66 IN | HEART RATE: 57 BPM | BODY MASS INDEX: 28.28 KG/M2 | TEMPERATURE: 97.1 F

## 2021-10-14 DIAGNOSIS — E11.9 TYPE 2 DIABETES MELLITUS WITHOUT COMPLICATION, WITHOUT LONG-TERM CURRENT USE OF INSULIN (H): ICD-10-CM

## 2021-10-14 DIAGNOSIS — I10 ESSENTIAL HYPERTENSION, BENIGN: Primary | ICD-10-CM

## 2021-10-14 DIAGNOSIS — Z23 HIGH PRIORITY FOR 2019-NCOV VACCINE: ICD-10-CM

## 2021-10-14 PROCEDURE — 99213 OFFICE O/P EST LOW 20 MIN: CPT | Performed by: INTERNAL MEDICINE

## 2021-10-14 RX ORDER — METOPROLOL TARTRATE 50 MG
TABLET ORAL
Qty: 90 TABLET | Refills: 3 | Status: SHIPPED | OUTPATIENT
Start: 2021-10-14 | End: 2022-01-01

## 2021-10-14 ASSESSMENT — MIFFLIN-ST. JEOR: SCORE: 1362.11

## 2021-10-14 NOTE — PROGRESS NOTES
"    Assessment & Plan     Essential hypertension, benign  Blood pressure is OK on lower dose of monopril   Refilled metoprolol   - metoprolol tartrate (LOPRESSOR) 50 MG tablet; TAKE 1/2 TABLET BY MOUTH TWICE A DAY    Type 2 diabetes mellitus without complication, without long-term current use of insulin (H)  Recheck A1c in March     High priority for 2019-nCoV vaccine  Booster today         10 minutes spent on the date of the encounter doing chart review, history and exam, documentation and further activities per the note       BMI:   Estimated body mass index is 28.62 kg/m  as calculated from the following:    Height as of this encounter: 1.67 m (5' 5.75\").    Weight as of this encounter: 79.8 kg (176 lb).           Return in about 5 months (around 3/14/2022).  Patient instructed to return to clinic or contact us sooner if symptoms worsen or new symptoms develop.     Forest Ashraf MD  [facility] [place] is a 89 year old who presents for the following health issues  accompanied by his spouse:    HPI     1 month BP follow up.   BP home readings have been good on new dose of medication per wife.         Review of Systems   Constitutional, HEENT, cardiovascular, pulmonary, gi and gu systems are negative, except as otherwise noted.      Objective    /77 (BP Location: Left arm, Cuff Size: Adult Regular)   Pulse 57   Temp 97.1  F (36.2  C) (Temporal)   Resp 14   Ht 1.67 m (5' 5.75\")   Wt 79.8 kg (176 lb)   SpO2 96%   BMI 28.62 kg/m    Body mass index is 28.62 kg/m .  Physical Exam   GENERAL: healthy, alert and no distress  RESP: lungs clear to auscultation - no rales, rhonchi or wheezes  CV: Heart with regular rate and rhythm.   ABDOMEN: soft, nontender, no hepatosplenomegaly, no masses and bowel sounds normal  NEURO: very hard of hearing, uses a walker   PSYCH: well groomed, unchanged mildly flat affect                 "

## 2021-12-19 ENCOUNTER — HEALTH MAINTENANCE LETTER (OUTPATIENT)
Age: 86
End: 2021-12-19

## 2022-01-01 ENCOUNTER — APPOINTMENT (OUTPATIENT)
Dept: CT IMAGING | Facility: CLINIC | Age: 87
End: 2022-01-01
Attending: EMERGENCY MEDICINE
Payer: COMMERCIAL

## 2022-01-01 ENCOUNTER — TRANSFERRED RECORDS (OUTPATIENT)
Dept: FAMILY MEDICINE | Facility: CLINIC | Age: 87
End: 2022-01-01

## 2022-01-01 ENCOUNTER — APPOINTMENT (OUTPATIENT)
Dept: GENERAL RADIOLOGY | Facility: CLINIC | Age: 87
End: 2022-01-01
Attending: EMERGENCY MEDICINE
Payer: COMMERCIAL

## 2022-01-01 ENCOUNTER — HOSPITAL ENCOUNTER (OUTPATIENT)
Dept: WOUND CARE | Facility: CLINIC | Age: 87
Discharge: HOME | End: 2022-10-05
Attending: PHYSICIAN ASSISTANT | Admitting: PHYSICIAN ASSISTANT
Payer: COMMERCIAL

## 2022-01-01 ENCOUNTER — TELEPHONE (OUTPATIENT)
Dept: WOUND CARE | Facility: CLINIC | Age: 87
End: 2022-01-01

## 2022-01-01 ENCOUNTER — HEALTH MAINTENANCE LETTER (OUTPATIENT)
Age: 87
End: 2022-01-01

## 2022-01-01 ENCOUNTER — HOSPITAL ENCOUNTER (OUTPATIENT)
Facility: CLINIC | Age: 87
Setting detail: OBSERVATION
Discharge: HOME | End: 2023-01-06
Attending: EMERGENCY MEDICINE | Admitting: EMERGENCY MEDICINE
Payer: COMMERCIAL

## 2022-01-01 ENCOUNTER — HOSPITAL ENCOUNTER (EMERGENCY)
Facility: CLINIC | Age: 87
Discharge: HOME | End: 2022-07-30
Attending: EMERGENCY MEDICINE | Admitting: EMERGENCY MEDICINE
Payer: COMMERCIAL

## 2022-01-01 ENCOUNTER — HOSPITAL ENCOUNTER (OUTPATIENT)
Dept: WOUND CARE | Facility: CLINIC | Age: 87
Discharge: HOME | End: 2022-10-26
Attending: PHYSICIAN ASSISTANT
Payer: COMMERCIAL

## 2022-01-01 ENCOUNTER — NURSE TRIAGE (OUTPATIENT)
Dept: FAMILY MEDICINE | Facility: CLINIC | Age: 87
End: 2022-01-01

## 2022-01-01 ENCOUNTER — HOSPITAL ENCOUNTER (OUTPATIENT)
Dept: WOUND CARE | Facility: CLINIC | Age: 87
Discharge: HOME | End: 2022-08-23
Attending: SURGERY
Payer: COMMERCIAL

## 2022-01-01 ENCOUNTER — TELEPHONE (OUTPATIENT)
Dept: FAMILY MEDICINE | Facility: CLINIC | Age: 87
End: 2022-01-01

## 2022-01-01 ENCOUNTER — HOSPITAL ENCOUNTER (OUTPATIENT)
Dept: WOUND CARE | Facility: CLINIC | Age: 87
Discharge: HOME | End: 2022-08-09
Attending: SURGERY
Payer: COMMERCIAL

## 2022-01-01 ENCOUNTER — MYC MEDICAL ADVICE (OUTPATIENT)
Dept: FAMILY MEDICINE | Facility: CLINIC | Age: 87
End: 2022-01-01

## 2022-01-01 ENCOUNTER — MEDICAL CORRESPONDENCE (OUTPATIENT)
Dept: HEALTH INFORMATION MANAGEMENT | Facility: CLINIC | Age: 87
End: 2022-01-01

## 2022-01-01 ENCOUNTER — OFFICE VISIT (OUTPATIENT)
Dept: FAMILY MEDICINE | Facility: CLINIC | Age: 87
End: 2022-01-01
Payer: COMMERCIAL

## 2022-01-01 VITALS
OXYGEN SATURATION: 95 % | SYSTOLIC BLOOD PRESSURE: 178 MMHG | TEMPERATURE: 98.7 F | HEART RATE: 66 BPM | DIASTOLIC BLOOD PRESSURE: 101 MMHG | RESPIRATION RATE: 18 BRPM

## 2022-01-01 VITALS
TEMPERATURE: 97.1 F | HEIGHT: 66 IN | BODY MASS INDEX: 28.93 KG/M2 | WEIGHT: 180 LBS | SYSTOLIC BLOOD PRESSURE: 130 MMHG | RESPIRATION RATE: 16 BRPM | DIASTOLIC BLOOD PRESSURE: 72 MMHG | OXYGEN SATURATION: 98 % | HEART RATE: 65 BPM

## 2022-01-01 VITALS
SYSTOLIC BLOOD PRESSURE: 156 MMHG | RESPIRATION RATE: 16 BRPM | DIASTOLIC BLOOD PRESSURE: 80 MMHG | TEMPERATURE: 96.9 F | HEART RATE: 86 BPM

## 2022-01-01 VITALS
OXYGEN SATURATION: 95 % | HEART RATE: 67 BPM | DIASTOLIC BLOOD PRESSURE: 60 MMHG | TEMPERATURE: 97.5 F | SYSTOLIC BLOOD PRESSURE: 100 MMHG | BODY MASS INDEX: 29.84 KG/M2 | RESPIRATION RATE: 16 BRPM | WEIGHT: 183.5 LBS

## 2022-01-01 DIAGNOSIS — I10 ESSENTIAL HYPERTENSION, BENIGN: ICD-10-CM

## 2022-01-01 DIAGNOSIS — E85.4 CEREBRAL AMYLOID ANGIOPATHY (H): ICD-10-CM

## 2022-01-01 DIAGNOSIS — Z51.5 END OF LIFE CARE: ICD-10-CM

## 2022-01-01 DIAGNOSIS — S51.002D ELBOW WOUND, LEFT, SUBSEQUENT ENCOUNTER: ICD-10-CM
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
## 2022-01-01 DIAGNOSIS — E11.9 TYPE 2 DIABETES MELLITUS WITHOUT COMPLICATION, WITHOUT LONG-TERM CURRENT USE OF INSULIN (H): Primary | ICD-10-CM
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
## 2022-01-01 DIAGNOSIS — S81.812A SKIN TEAR OF LEFT LOWER LEG WITHOUT COMPLICATION, INITIAL ENCOUNTER: Primary | ICD-10-CM

## 2022-01-01 DIAGNOSIS — E11.9 TYPE 2 DIABETES MELLITUS WITHOUT COMPLICATION, WITHOUT LONG-TERM CURRENT USE OF INSULIN (H): ICD-10-CM

## 2022-01-01 DIAGNOSIS — I68.0 CEREBRAL AMYLOID ANGIOPATHY (H): ICD-10-CM

## 2022-01-01 DIAGNOSIS — S51.002D ELBOW WOUND, LEFT, SUBSEQUENT ENCOUNTER: Primary | ICD-10-CM

## 2022-01-01 DIAGNOSIS — I68.0 CEREBRAL AMYLOID ANGIOPATHY (H): Primary | ICD-10-CM

## 2022-01-01 DIAGNOSIS — M25.422 EFFUSION OF LEFT ELBOW: ICD-10-CM

## 2022-01-01 DIAGNOSIS — M62.81 GENERALIZED MUSCLE WEAKNESS: ICD-10-CM

## 2022-01-01 DIAGNOSIS — Z23 NEED FOR PROPHYLACTIC VACCINATION AND INOCULATION AGAINST INFLUENZA: ICD-10-CM

## 2022-01-01 DIAGNOSIS — I48.91 ATRIAL FIBRILLATION, UNSPECIFIED TYPE (H): ICD-10-CM

## 2022-01-01 DIAGNOSIS — E78.5 HYPERLIPIDEMIA LDL GOAL <100: Primary | ICD-10-CM

## 2022-01-01 DIAGNOSIS — Z00.00 ROUTINE GENERAL MEDICAL EXAMINATION AT A HEALTH CARE FACILITY: Primary | ICD-10-CM

## 2022-01-01 DIAGNOSIS — E85.4 CEREBRAL AMYLOID ANGIOPATHY (H): Primary | ICD-10-CM

## 2022-01-01 DIAGNOSIS — S61.412A SKIN TEAR OF LEFT HAND WITHOUT COMPLICATION, INITIAL ENCOUNTER: ICD-10-CM

## 2022-01-01 DIAGNOSIS — S81.812A SKIN TEAR OF LEFT LOWER LEG WITHOUT COMPLICATION, INITIAL ENCOUNTER: ICD-10-CM

## 2022-01-01 DIAGNOSIS — G40.909 SEIZURE DISORDER (H): ICD-10-CM

## 2022-01-01 DIAGNOSIS — S51.012A SKIN TEAR OF LEFT ELBOW WITHOUT COMPLICATION, INITIAL ENCOUNTER: ICD-10-CM
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
## 2022-01-01 LAB
ALBUMIN SERPL-MCNC: 3.8 G/DL (ref 3.4–5)
ALBUMIN UR-MCNC: 50 MG/DL
ALP SERPL-CCNC: 63 U/L (ref 40–150)
ALT SERPL W P-5'-P-CCNC: 17 U/L (ref 0–70)
ANION GAP SERPL CALCULATED.3IONS-SCNC: 10 MMOL/L (ref 3–14)
ANION GAP SERPL CALCULATED.3IONS-SCNC: 9 MMOL/L (ref 3–14)
APPEARANCE UR: CLEAR
AST SERPL W P-5'-P-CCNC: 22 U/L (ref 0–45)
BASOPHILS # BLD AUTO: 0.1 10E3/UL (ref 0–0.2)
BASOPHILS NFR BLD AUTO: 1 %
BILIRUB SERPL-MCNC: 1.2 MG/DL (ref 0.2–1.3)
BILIRUB UR QL STRIP: NEGATIVE
BUN SERPL-MCNC: 15 MG/DL (ref 7–30)
BUN SERPL-MCNC: 19 MG/DL (ref 7–30)
CALCIUM SERPL-MCNC: 9.2 MG/DL (ref 8.5–10.1)
CALCIUM SERPL-MCNC: 9.2 MG/DL (ref 8.5–10.1)
CHLORIDE BLD-SCNC: 100 MMOL/L (ref 94–109)
CHLORIDE BLD-SCNC: 103 MMOL/L (ref 94–109)
CHOLEST SERPL-MCNC: 177 MG/DL
CO2 SERPL-SCNC: 23 MMOL/L (ref 20–32)
CO2 SERPL-SCNC: 27 MMOL/L (ref 20–32)
COLOR UR AUTO: ABNORMAL
CREAT SERPL-MCNC: 0.88 MG/DL (ref 0.66–1.25)
CREAT SERPL-MCNC: 1.16 MG/DL (ref 0.66–1.25)
CREAT UR-MCNC: 138 MG/DL
EOSINOPHIL # BLD AUTO: 0 10E3/UL (ref 0–0.7)
EOSINOPHIL NFR BLD AUTO: 0 %
ERYTHROCYTE [DISTWIDTH] IN BLOOD BY AUTOMATED COUNT: 13.2 % (ref 10–15)
ERYTHROCYTE [DISTWIDTH] IN BLOOD BY AUTOMATED COUNT: 13.8 % (ref 10–15)
FASTING STATUS PATIENT QL REPORTED: ABNORMAL
FLUAV RNA SPEC QL NAA+PROBE: NEGATIVE
FLUBV RNA RESP QL NAA+PROBE: NEGATIVE
GFR SERPL CREATININE-BSD FRML MDRD: 57 ML/MIN/1.73M2
GFR SERPL CREATININE-BSD FRML MDRD: 78 ML/MIN/1.73M2
GLUCOSE BLD-MCNC: 146 MG/DL (ref 70–99)
GLUCOSE BLD-MCNC: 154 MG/DL (ref 70–99)
GLUCOSE BLDC GLUCOMTR-MCNC: 124 MG/DL (ref 70–99)
GLUCOSE BLDC GLUCOMTR-MCNC: 203 MG/DL (ref 70–99)
GLUCOSE UR STRIP-MCNC: NEGATIVE MG/DL
HBA1C MFR BLD: 6.9 % (ref 0–5.6)
HBA1C MFR BLD: 6.9 % (ref 0–5.6)
HCT VFR BLD AUTO: 38.7 % (ref 40–53)
HCT VFR BLD AUTO: 40 % (ref 40–53)
HDLC SERPL-MCNC: 35 MG/DL
HGB BLD-MCNC: 12.6 G/DL (ref 13.3–17.7)
HGB BLD-MCNC: 13 G/DL (ref 13.3–17.7)
HGB UR QL STRIP: ABNORMAL
HOLD SPECIMEN: NORMAL
IMM GRANULOCYTES # BLD: 0 10E3/UL
IMM GRANULOCYTES NFR BLD: 0 %
KETONES UR STRIP-MCNC: ABNORMAL MG/DL
LDLC SERPL CALC-MCNC: 111 MG/DL
LEUKOCYTE ESTERASE UR QL STRIP: NEGATIVE
LYMPHOCYTES # BLD AUTO: 1.1 10E3/UL (ref 0.8–5.3)
LYMPHOCYTES NFR BLD AUTO: 11 %
MCH RBC QN AUTO: 29.7 PG (ref 26.5–33)
MCH RBC QN AUTO: 30.3 PG (ref 26.5–33)
MCHC RBC AUTO-ENTMCNC: 32.5 G/DL (ref 31.5–36.5)
MCHC RBC AUTO-ENTMCNC: 32.6 G/DL (ref 31.5–36.5)
MCV RBC AUTO: 91 FL (ref 78–100)
MCV RBC AUTO: 93 FL (ref 78–100)
MICROALBUMIN UR-MCNC: 79 MG/L
MICROALBUMIN/CREAT UR: 57.25 MG/G CR (ref 0–17)
MONOCYTES # BLD AUTO: 1.2 10E3/UL (ref 0–1.3)
MONOCYTES NFR BLD AUTO: 13 %
NEUTROPHILS # BLD AUTO: 7 10E3/UL (ref 1.6–8.3)
NEUTROPHILS NFR BLD AUTO: 75 %
NITRATE UR QL: NEGATIVE
NONHDLC SERPL-MCNC: 142 MG/DL
NRBC # BLD AUTO: 0 10E3/UL
NRBC BLD AUTO-RTO: 0 /100
PH UR STRIP: 7.5 [PH] (ref 5–7)
PLATELET # BLD AUTO: 238 10E3/UL (ref 150–450)
PLATELET # BLD AUTO: 250 10E3/UL (ref 150–450)
POTASSIUM BLD-SCNC: 3.7 MMOL/L (ref 3.4–5.3)
POTASSIUM BLD-SCNC: 4.6 MMOL/L (ref 3.4–5.3)
PROCALCITONIN SERPL-MCNC: <0.05 NG/ML
PROT SERPL-MCNC: 7.2 G/DL (ref 6.8–8.8)
RBC # BLD AUTO: 4.16 10E6/UL (ref 4.4–5.9)
RBC # BLD AUTO: 4.38 10E6/UL (ref 4.4–5.9)
RBC URINE: <1 /HPF
RSV RNA SPEC NAA+PROBE: NEGATIVE
SARS-COV-2 RNA RESP QL NAA+PROBE: NEGATIVE
SODIUM SERPL-SCNC: 135 MMOL/L (ref 133–144)
SODIUM SERPL-SCNC: 137 MMOL/L (ref 133–144)
SP GR UR STRIP: 1.01 (ref 1–1.03)
TRIGL SERPL-MCNC: 156 MG/DL
UROBILINOGEN UR STRIP-MCNC: NORMAL MG/DL
WBC # BLD AUTO: 7.2 10E3/UL (ref 4–11)
WBC # BLD AUTO: 9.4 10E3/UL (ref 4–11)
WBC URINE: <1 /HPF

## 2022-01-01 PROCEDURE — 250N000013 HC RX MED GY IP 250 OP 250 PS 637: Performed by: EMERGENCY MEDICINE

## 2022-01-01 PROCEDURE — 90662 IIV NO PRSV INCREASED AG IM: CPT | Performed by: INTERNAL MEDICINE

## 2022-01-01 PROCEDURE — 74177 CT ABD & PELVIS W/CONTRAST: CPT

## 2022-01-01 PROCEDURE — 90715 TDAP VACCINE 7 YRS/> IM: CPT | Performed by: EMERGENCY MEDICINE

## 2022-01-01 PROCEDURE — 250N000013 HC RX MED GY IP 250 OP 250 PS 637: Performed by: HOSPITALIST

## 2022-01-01 PROCEDURE — 87637 SARSCOV2&INF A&B&RSV AMP PRB: CPT | Performed by: EMERGENCY MEDICINE

## 2022-01-01 PROCEDURE — 83036 HEMOGLOBIN GLYCOSYLATED A1C: CPT | Performed by: HOSPITALIST

## 2022-01-01 PROCEDURE — 82043 UR ALBUMIN QUANTITATIVE: CPT | Performed by: INTERNAL MEDICINE

## 2022-01-01 PROCEDURE — 96372 THER/PROPH/DIAG INJ SC/IM: CPT

## 2022-01-01 PROCEDURE — 73080 X-RAY EXAM OF ELBOW: CPT | Mod: LT

## 2022-01-01 PROCEDURE — 250N000011 HC RX IP 250 OP 636: Performed by: EMERGENCY MEDICINE

## 2022-01-01 PROCEDURE — 84145 PROCALCITONIN (PCT): CPT | Performed by: HOSPITALIST

## 2022-01-01 PROCEDURE — 70450 CT HEAD/BRAIN W/O DYE: CPT

## 2022-01-01 PROCEDURE — 99285 EMERGENCY DEPT VISIT HI MDM: CPT | Mod: 25

## 2022-01-01 PROCEDURE — 99284 EMERGENCY DEPT VISIT MOD MDM: CPT | Mod: 25

## 2022-01-01 PROCEDURE — 80061 LIPID PANEL: CPT | Performed by: INTERNAL MEDICINE

## 2022-01-01 PROCEDURE — 80048 BASIC METABOLIC PNL TOTAL CA: CPT | Performed by: INTERNAL MEDICINE

## 2022-01-01 PROCEDURE — 99219 PR INITIAL OBSERVATION CARE,LEVEL II: CPT | Performed by: HOSPITALIST

## 2022-01-01 PROCEDURE — 96375 TX/PRO/DX INJ NEW DRUG ADDON: CPT

## 2022-01-01 PROCEDURE — 99207 PR FOOT EXAM NO CHARGE: CPT | Performed by: INTERNAL MEDICINE

## 2022-01-01 PROCEDURE — 36415 COLL VENOUS BLD VENIPUNCTURE: CPT | Performed by: INTERNAL MEDICINE

## 2022-01-01 PROCEDURE — 85027 COMPLETE CBC AUTOMATED: CPT | Performed by: INTERNAL MEDICINE

## 2022-01-01 PROCEDURE — 250N000009 HC RX 250: Performed by: EMERGENCY MEDICINE

## 2022-01-01 PROCEDURE — G0008 ADMIN INFLUENZA VIRUS VAC: HCPCS | Performed by: INTERNAL MEDICINE

## 2022-01-01 PROCEDURE — C9803 HOPD COVID-19 SPEC COLLECT: HCPCS

## 2022-01-01 PROCEDURE — 99212 OFFICE O/P EST SF 10 MIN: CPT | Mod: 95 | Performed by: SURGERY

## 2022-01-01 PROCEDURE — 83036 HEMOGLOBIN GLYCOSYLATED A1C: CPT | Performed by: INTERNAL MEDICINE

## 2022-01-01 PROCEDURE — 81003 URINALYSIS AUTO W/O SCOPE: CPT | Performed by: EMERGENCY MEDICINE

## 2022-01-01 PROCEDURE — 72125 CT NECK SPINE W/O DYE: CPT

## 2022-01-01 PROCEDURE — G0439 PPPS, SUBSEQ VISIT: HCPCS | Mod: 25 | Performed by: INTERNAL MEDICINE

## 2022-01-01 PROCEDURE — 36415 COLL VENOUS BLD VENIPUNCTURE: CPT | Performed by: EMERGENCY MEDICINE

## 2022-01-01 PROCEDURE — 99214 OFFICE O/P EST MOD 30 MIN: CPT | Performed by: INTERNAL MEDICINE

## 2022-01-01 PROCEDURE — 96374 THER/PROPH/DIAG INJ IV PUSH: CPT | Mod: 59

## 2022-01-01 PROCEDURE — 80053 COMPREHEN METABOLIC PANEL: CPT | Performed by: EMERGENCY MEDICINE

## 2022-01-01 PROCEDURE — 99212 OFFICE O/P EST SF 10 MIN: CPT | Mod: TEL | Performed by: SURGERY

## 2022-01-01 PROCEDURE — 90471 IMMUNIZATION ADMIN: CPT | Performed by: EMERGENCY MEDICINE

## 2022-01-01 PROCEDURE — 99214 OFFICE O/P EST MOD 30 MIN: CPT | Performed by: PHYSICIAN ASSISTANT

## 2022-01-01 PROCEDURE — G0378 HOSPITAL OBSERVATION PER HR: HCPCS

## 2022-01-01 PROCEDURE — 99212 OFFICE O/P EST SF 10 MIN: CPT | Mod: TEL | Performed by: PHYSICIAN ASSISTANT

## 2022-01-01 PROCEDURE — 97602 WOUND(S) CARE NON-SELECTIVE: CPT

## 2022-01-01 PROCEDURE — 85025 COMPLETE CBC W/AUTO DIFF WBC: CPT | Performed by: EMERGENCY MEDICINE

## 2022-01-01 PROCEDURE — 82962 GLUCOSE BLOOD TEST: CPT

## 2022-01-01 RX ORDER — ONDANSETRON 2 MG/ML
4 INJECTION INTRAMUSCULAR; INTRAVENOUS EVERY 6 HOURS PRN
Status: DISCONTINUED | OUTPATIENT
Start: 2022-01-01 | End: 2023-01-01 | Stop reason: HOSPADM

## 2022-01-01 RX ORDER — FOSINOPRIL SODIUM 10 MG/1
10 TABLET ORAL DAILY
Qty: 90 TABLET | Refills: 3 | Status: SHIPPED | OUTPATIENT
Start: 2022-01-01 | End: 2022-01-01

## 2022-01-01 RX ORDER — NICOTINE POLACRILEX 4 MG
15-30 LOZENGE BUCCAL
Status: DISCONTINUED | OUTPATIENT
Start: 2022-01-01 | End: 2023-01-01 | Stop reason: HOSPADM

## 2022-01-01 RX ORDER — METOPROLOL TARTRATE 50 MG
TABLET ORAL
Qty: 90 TABLET | Refills: 3 | Status: SHIPPED | OUTPATIENT
Start: 2022-01-01

## 2022-01-01 RX ORDER — UBIDECARENONE 100 MG
100 CAPSULE ORAL DAILY
COMMUNITY

## 2022-01-01 RX ORDER — FOSINOPRIL SODIUM 10 MG/1
10 TABLET ORAL DAILY
Status: DISCONTINUED | OUTPATIENT
Start: 2023-01-01 | End: 2022-01-01

## 2022-01-01 RX ORDER — ACETAMINOPHEN 650 MG/1
650 SUPPOSITORY RECTAL EVERY 6 HOURS PRN
Status: DISCONTINUED | OUTPATIENT
Start: 2022-01-01 | End: 2023-01-01 | Stop reason: HOSPADM

## 2022-01-01 RX ORDER — HYDRALAZINE HYDROCHLORIDE 20 MG/ML
10 INJECTION INTRAMUSCULAR; INTRAVENOUS EVERY 4 HOURS PRN
Status: DISCONTINUED | OUTPATIENT
Start: 2022-01-01 | End: 2023-01-01 | Stop reason: HOSPADM

## 2022-01-01 RX ORDER — AMLODIPINE BESYLATE 2.5 MG/1
2.5 TABLET ORAL 2 TIMES DAILY
Status: DISCONTINUED | OUTPATIENT
Start: 2022-01-01 | End: 2023-01-01

## 2022-01-01 RX ORDER — LIDOCAINE 40 MG/G
CREAM TOPICAL
Status: DISCONTINUED | OUTPATIENT
Start: 2022-01-01 | End: 2023-01-01 | Stop reason: HOSPADM

## 2022-01-01 RX ORDER — ACETAMINOPHEN 500 MG
1000 TABLET ORAL ONCE
Status: COMPLETED | OUTPATIENT
Start: 2022-01-01 | End: 2022-01-01

## 2022-01-01 RX ORDER — ONDANSETRON 2 MG/ML
4 INJECTION INTRAMUSCULAR; INTRAVENOUS ONCE
Status: COMPLETED | OUTPATIENT
Start: 2022-01-01 | End: 2022-01-01

## 2022-01-01 RX ORDER — LABETALOL HYDROCHLORIDE 5 MG/ML
20 INJECTION, SOLUTION INTRAVENOUS ONCE
Status: COMPLETED | OUTPATIENT
Start: 2022-01-01 | End: 2022-01-01

## 2022-01-01 RX ORDER — FOSINOPRIL SODIUM 10 MG/1
TABLET ORAL
Qty: 180 TABLET | Refills: 3 | Status: ON HOLD | OUTPATIENT
Start: 2022-01-01 | End: 2023-01-01

## 2022-01-01 RX ORDER — AMLODIPINE BESYLATE 2.5 MG/1
TABLET ORAL
Qty: 180 TABLET | Refills: 2 | Status: ON HOLD | OUTPATIENT
Start: 2022-01-01 | End: 2023-01-01

## 2022-01-01 RX ORDER — ESCITALOPRAM OXALATE 5 MG/1
5 TABLET ORAL DAILY
Status: DISCONTINUED | OUTPATIENT
Start: 2022-01-01 | End: 2023-01-01 | Stop reason: HOSPADM

## 2022-01-01 RX ORDER — ONDANSETRON 4 MG/1
4 TABLET, ORALLY DISINTEGRATING ORAL EVERY 6 HOURS PRN
Status: DISCONTINUED | OUTPATIENT
Start: 2022-01-01 | End: 2023-01-01 | Stop reason: HOSPADM

## 2022-01-01 RX ORDER — HYDROMORPHONE HYDROCHLORIDE 1 MG/ML
0.5 INJECTION, SOLUTION INTRAMUSCULAR; INTRAVENOUS; SUBCUTANEOUS
Status: DISCONTINUED | OUTPATIENT
Start: 2022-01-01 | End: 2022-01-01

## 2022-01-01 RX ORDER — CALCIUM CARBONATE 500(1250)
TABLET,CHEWABLE ORAL
COMMUNITY
End: 2022-01-01

## 2022-01-01 RX ORDER — IOPAMIDOL 755 MG/ML
91 INJECTION, SOLUTION INTRAVASCULAR ONCE
Status: COMPLETED | OUTPATIENT
Start: 2022-01-01 | End: 2022-01-01

## 2022-01-01 RX ORDER — LISINOPRIL 2.5 MG/1
2.5 TABLET ORAL DAILY
Status: DISCONTINUED | OUTPATIENT
Start: 2023-01-01 | End: 2023-01-01 | Stop reason: HOSPADM

## 2022-01-01 RX ORDER — MEMANTINE HYDROCHLORIDE 10 MG/1
10 TABLET ORAL
COMMUNITY
Start: 2021-12-09 | End: 2022-01-01

## 2022-01-01 RX ORDER — DEXTROSE MONOHYDRATE 25 G/50ML
25-50 INJECTION, SOLUTION INTRAVENOUS
Status: DISCONTINUED | OUTPATIENT
Start: 2022-01-01 | End: 2023-01-01 | Stop reason: HOSPADM

## 2022-01-01 RX ORDER — ACETAMINOPHEN 325 MG/1
650 TABLET ORAL EVERY 6 HOURS PRN
Status: DISCONTINUED | OUTPATIENT
Start: 2022-01-01 | End: 2023-01-01 | Stop reason: HOSPADM

## 2022-01-01 RX ORDER — METOPROLOL TARTRATE 25 MG/1
25 TABLET, FILM COATED ORAL 2 TIMES DAILY
Status: DISCONTINUED | OUTPATIENT
Start: 2022-01-01 | End: 2023-01-01 | Stop reason: HOSPADM

## 2022-01-01 RX ADMIN — LABETALOL HYDROCHLORIDE 20 MG: 5 INJECTION INTRAVENOUS at 14:28

## 2022-01-01 RX ADMIN — SODIUM CHLORIDE 68 ML: 9 INJECTION, SOLUTION INTRAVENOUS at 14:00

## 2022-01-01 RX ADMIN — IOPAMIDOL 91 ML: 755 INJECTION, SOLUTION INTRAVENOUS at 14:00

## 2022-01-01 RX ADMIN — HYDROMORPHONE HYDROCHLORIDE 0.5 MG: 1 INJECTION, SOLUTION INTRAMUSCULAR; INTRAVENOUS; SUBCUTANEOUS at 11:42

## 2022-01-01 RX ADMIN — AMLODIPINE BESYLATE 2.5 MG: 2.5 TABLET ORAL at 19:48

## 2022-01-01 RX ADMIN — ACETAMINOPHEN 1000 MG: 500 TABLET, FILM COATED ORAL at 17:45

## 2022-01-01 RX ADMIN — ESCITALOPRAM 5 MG: 5 TABLET, FILM COATED ORAL at 19:48

## 2022-01-01 RX ADMIN — ONDANSETRON 4 MG: 2 INJECTION INTRAMUSCULAR; INTRAVENOUS at 11:41

## 2022-01-01 RX ADMIN — METOPROLOL TARTRATE 25 MG: 25 TABLET, FILM COATED ORAL at 19:48

## 2022-01-01 RX ADMIN — CLOSTRIDIUM TETANI TOXOID ANTIGEN (FORMALDEHYDE INACTIVATED), CORYNEBACTERIUM DIPHTHERIAE TOXOID ANTIGEN (FORMALDEHYDE INACTIVATED), BORDETELLA PERTUSSIS TOXOID ANTIGEN (GLUTARALDEHYDE INACTIVATED), BORDETELLA PERTUSSIS FILAMENTOUS HEMAGGLUTININ ANTIGEN (FORMALDEHYDE INACTIVATED), BORDETELLA PERTUSSIS PERTACTIN ANTIGEN, AND BORDETELLA PERTUSSIS FIMBRIAE 2/3 ANTIGEN 0.5 ML: 5; 2; 2.5; 5; 3; 5 INJECTION, SUSPENSION INTRAMUSCULAR at 17:47

## 2022-01-01 ASSESSMENT — ENCOUNTER SYMPTOMS
JOINT SWELLING: 0
HEMATOCHEZIA: 0
ABDOMINAL PAIN: 0
HEADACHES: 0
WEAKNESS: 1
FEVER: 0
PARESTHESIAS: 0
PALPITATIONS: 0
ABDOMINAL PAIN: 0
MYALGIAS: 0
COUGH: 0
CHILLS: 0
JOINT SWELLING: 0
COUGH: 0
CONSTIPATION: 0
PALPITATIONS: 0
NAUSEA: 0
NERVOUS/ANXIOUS: 0
WEAKNESS: 1
DYSURIA: 0
FREQUENCY: 0
DIZZINESS: 0
SORE THROAT: 0
WOUND: 1
HEADACHES: 0
HEMATOCHEZIA: 0
NECK PAIN: 0
EYE PAIN: 0
WEAKNESS: 1
FEVER: 0
NUMBNESS: 1
HEARTBURN: 0
NAUSEA: 0
MYALGIAS: 0
HEMATURIA: 0
ARTHRALGIAS: 0
VOMITING: 0
FREQUENCY: 0
DYSURIA: 0
ACTIVITY CHANGE: 0
CONSTIPATION: 0
ARTHRALGIAS: 1
SORE THROAT: 0
HEMATURIA: 0
DIARRHEA: 0
CONFUSION: 1
EYE PAIN: 0
PARESTHESIAS: 0
HEARTBURN: 0
SHORTNESS OF BREATH: 0
NERVOUS/ANXIOUS: 0
DIZZINESS: 0
DIARRHEA: 0
HEADACHES: 0
ARTHRALGIAS: 0
BACK PAIN: 1
CHILLS: 0
NAUSEA: 0

## 2022-01-01 ASSESSMENT — ACTIVITIES OF DAILY LIVING (ADL)
CURRENT_FUNCTION: PREPARING MEALS REQUIRES ASSISTANCE
CURRENT_FUNCTION: SHOPPING REQUIRES ASSISTANCE
CURRENT_FUNCTION: HOUSEWORK REQUIRES ASSISTANCE
CURRENT_FUNCTION: BATHING REQUIRES ASSISTANCE
ADLS_ACUITY_SCORE: 45
ADLS_ACUITY_SCORE: 57
CURRENT_FUNCTION: LAUNDRY REQUIRES ASSISTANCE
CURRENT_FUNCTION: SHOPPING REQUIRES ASSISTANCE
CURRENT_FUNCTION: PREPARING MEALS REQUIRES ASSISTANCE
ADLS_ACUITY_SCORE: 45
ADLS_ACUITY_SCORE: 57
CURRENT_FUNCTION: TRANSPORTATION REQUIRES ASSISTANCE
CURRENT_FUNCTION: TELEPHONE REQUIRES ASSISTANCE
CURRENT_FUNCTION: TELEPHONE REQUIRES ASSISTANCE
CURRENT_FUNCTION: MONEY MANAGEMENT REQUIRES ASSISTANCE
CURRENT_FUNCTION: TRANSPORTATION REQUIRES ASSISTANCE
ADLS_ACUITY_SCORE: 45
ADLS_ACUITY_SCORE: 45
CURRENT_FUNCTION: MEDICATION ADMINISTRATION REQUIRES ASSISTANCE
CURRENT_FUNCTION: LAUNDRY REQUIRES ASSISTANCE
ADLS_ACUITY_SCORE: 45
CURRENT_FUNCTION: MONEY MANAGEMENT REQUIRES ASSISTANCE
CURRENT_FUNCTION: MEDICATION ADMINISTRATION REQUIRES ASSISTANCE
CURRENT_FUNCTION: HOUSEWORK REQUIRES ASSISTANCE
CURRENT_FUNCTION: BATHING REQUIRES ASSISTANCE

## 2022-01-01 ASSESSMENT — PAIN SCALES - GENERAL
PAINLEVEL: NO PAIN (0)
PAINLEVEL: NO PAIN (0)

## 2022-01-01 ASSESSMENT — PATIENT HEALTH QUESTIONNAIRE - PHQ9
10. IF YOU CHECKED OFF ANY PROBLEMS, HOW DIFFICULT HAVE THESE PROBLEMS MADE IT FOR YOU TO DO YOUR WORK, TAKE CARE OF THINGS AT HOME, OR GET ALONG WITH OTHER PEOPLE: SOMEWHAT DIFFICULT
SUM OF ALL RESPONSES TO PHQ QUESTIONS 1-9: 14
SUM OF ALL RESPONSES TO PHQ QUESTIONS 1-9: 14

## 2022-01-03 ENCOUNTER — TRANSFERRED RECORDS (OUTPATIENT)
Dept: HEALTH INFORMATION MANAGEMENT | Facility: CLINIC | Age: 87
End: 2022-01-03
Payer: COMMERCIAL

## 2022-03-16 NOTE — PROGRESS NOTES
Assessment & Plan     Atrial fibrillation, unspecified type (H)  Rate controlled  Not a candidate for anticoagulants due to CAA    Seizure disorder (H)  Stable     Cerebral amyloid angiopathy (H)  Balancing act between careful blood pressure control and blood pressure being too low contributing to risk for falls  ACE/CCB combo might be good to prevent blood pressure spikes  Further reduce fosinopril form 10 bid to 10 qday     Type 2 diabetes mellitus without complication, without long-term current use of insulin (H)  Recheck labs   - Basic metabolic panel; Future  - CBC with platelets; Future  - HEMOGLOBIN A1C; Future  - FOOT EXAM  NO CHARGE [81863.114]  - Albumin Random Urine Quantitative with Creat Ratio; Future    Hyperlipidemia LDL goal <100  Recheck labs  - Lipid panel reflex to direct LDL Fasting; Future    Essential hypertension, benign  See discussion under CAA  - fosinopril (MONOPRIL) 10 MG tablet; Take 1 tablet (10 mg) by mouth daily      20 minutes spent on the date of the encounter doing chart review, history and exam, documentation and further activities per the note       Return in about 6 months (around 9/16/2022) for recheck.  Patient instructed to return to clinic or contact us sooner if symptoms worsen or new symptoms develop.     Forest Ashraf MD  [facility] [place] is a 89 year old who presents for the following health issues  accompanied by his spouse.    HPI     Follow up diabetes hypertension, CAA, dementia, atrial fibrillation, BPH, history of seizures    A few falls when getting up from bed or chair since last visit without injury    History from patient difficult due to dementia  Wife provides hx     Review of Systems         Objective    /60 (BP Location: Right arm, Patient Position: Standing, Cuff Size: Adult Regular)   Pulse 67   Temp 97.5  F (36.4  C) (Temporal)   Resp 16   Wt 83.2 kg (183 lb 8 oz)   SpO2 95%   BMI 29.84 kg/m    Body  mass index is 29.84 kg/m .  Physical Exam   GENERAL: healthy, alert and no distress  NECK: no adenopathy, no asymmetry, masses, or scars and thyroid normal to palpation  RESP: lungs clear to auscultation - no rales, rhonchi or wheezes  CV: The heart has an irregularly irregular rhythm with a normal rate.   ABDOMEN: soft, nontender, no hepatosplenomegaly, no masses and bowel sounds normal  MS: no gross musculoskeletal defects noted, no edema  NEURO: Very forgetful, does not say much, moves all extremities, grossly symmetric strength, uses walker  PSYCH: Appropriate affect well groomed   Diabetic foot exam: normal DP and PT pulses, no trophic changes or ulcerative lesions and normal sensory exam

## 2022-03-16 NOTE — PATIENT INSTRUCTIONS

## 2022-03-16 NOTE — LETTER
March 18, 2022      Ernie Leary  [address]   [place] [address]        Dear ,    The following letter pertains to your most recent diagnostic tests:     Ernie, these result look stable.  The cholesterol and sugar control are OK.  Electrolytes and kidney function are OK.  Blood counts are stable.         Resulted Orders   Basic metabolic panel   Result Value Ref Range    Sodium 135 133 - 144 mmol/L    Potassium 4.6 3.4 - 5.3 mmol/L    Chloride 103 94 - 109 mmol/L    Carbon Dioxide (CO2) 23 20 - 32 mmol/L    Anion Gap 9 3 - 14 mmol/L    Urea Nitrogen 19 7 - 30 mg/dL    Creatinine 1.16 0.66 - 1.25 mg/dL    Calcium 9.2 8.5 - 10.1 mg/dL    Glucose 154 (H) 70 - 99 mg/dL    GFR Estimate 57 (L) >60 mL/min/1.73m2      Comment:      Effective December 21, 2021 eGFRcr in adults is calculated using the 2021 CKD-EPI creatinine equation which includes age and gender (Glendy et al., [facility], DOI: 10.1056/LPEJpe0090847)   CBC with platelets   Result Value Ref Range    WBC Count 7.2 4.0 - 11.0 10e3/uL    RBC Count 4.16 (L) 4.40 - 5.90 10e6/uL    Hemoglobin 12.6 (L) 13.3 - 17.7 g/dL    Hematocrit 38.7 (L) 40.0 - 53.0 %    MCV 93 78 - 100 fL    MCH 30.3 26.5 - 33.0 pg    MCHC 32.6 31.5 - 36.5 g/dL    RDW 13.8 10.0 - 15.0 %    Platelet Count 238 150 - 450 10e3/uL   HEMOGLOBIN A1C   Result Value Ref Range    Hemoglobin A1C 6.9 (H) 0.0 - 5.6 %      Comment:      Normal <5.7%   Prediabetes 5.7-6.4%    Diabetes 6.5% or higher     Note: Adopted from ADA consensus guidelines.   Albumin Random Urine Quantitative with Creat Ratio   Result Value Ref Range    Creatinine Urine mg/dL 138 mg/dL    Albumin Urine mg/L 79 mg/L    Albumin Urine mg/g Cr 57.25 (H) 0.00 - 17.00 mg/g Cr   Lipid panel reflex to direct LDL Fasting   Result Value Ref Range    Cholesterol 177 <200 mg/dL    Triglycerides 156 (H) <150 mg/dL    Direct Measure HDL 35 (L) >=40 mg/dL    LDL Cholesterol Calculated 111 (H) <=100 mg/dL    Non HDL Cholesterol 142  (H) <130 mg/dL    Patient Fasting > 8hrs? Unknown     Narrative    Cholesterol  Desirable:  <200 mg/dL    Triglycerides  Normal:  Less than 150 mg/dL  Borderline High:  150-199 mg/dL  High:  200-499 mg/dL  Very High:  Greater than or equal to 500 mg/dL    Direct Measure HDL  Female:  Greater than or equal to 50 mg/dL   Male:  Greater than or equal to 40 mg/dL    LDL Cholesterol  Desirable:  <100mg/dL  Above Desirable:  100-129 mg/dL   Borderline High:  130-159 mg/dL   High:  160-189 mg/dL   Very High:  >= 190 mg/dL    Non HDL Cholesterol  Desirable:  130 mg/dL  Above Desirable:  130-159 mg/dL  Borderline High:  160-189 mg/dL  High:  190-219 mg/dL  Very High:  Greater than or equal to 220 mg/dL       If you have any questions or concerns, please call the clinic at the number listed above.       Sincerely,      Forest Ashraf MD      william

## 2022-03-18 NOTE — RESULT ENCOUNTER NOTE
The following letter pertains to your most recent diagnostic tests:    Ernie, these result look stable.  The cholesterol and sugar control are OK.  Electrolytes and kidney function are OK.  Blood counts are stable.        Sincerely,    Dr. Ashraf

## 2022-07-30 NOTE — ED PROVIDER NOTES
History     Chief Complaint:  Fall    The history is provided by the patient and the spouse.      Ernie Leary is a 89 year old male with a history of HTN accompanied by his wife, Alexsandra, who presents for evaluation after sustaining an unwitnessed fall. Ernie's wife found his walker tipped over beside hers after he came to her bleeding. Ernie recalls the fall, denying syncope, but is uncertain exactly what the cause was. She believes his walker got caught on hers. He was able to get up on his own. He sustained skin tears and the degree of bleeding prompted Alexsandra's concern. He remarks there is pain to the left elbow but had no analgesics prior to arrival. He denies any shortness of breath or chest pain. He denies head injury, headache, neck pain, nausea, vomiting, or vision changes. His last tetanus booster was in 2012.    Review of Systems   Constitutional: Negative for activity change.   Eyes: Negative for visual disturbance.   Respiratory: Negative for shortness of breath.    Cardiovascular: Negative for chest pain.   Gastrointestinal: Negative for nausea and vomiting.   Musculoskeletal: Positive for arthralgias (left elbow). Negative for neck pain.   Skin: Positive for wound.   Neurological: Negative for syncope and headaches.   All other systems reviewed and are negative.    Allergies:  Adrenalin [Epinephrine Hcl]  Hctz    Medications:    amLODIPine (NORVASC) 2.5 MG tablet  calcium 600 MG tablet  calcium carbonate (TUMS) 500 MG chewable tablet  Cholecalciferol (VITAMIN D) 2000 UNITS tablet  Coenzyme Q-10 PO  Cyanocobalamin PO  escitalopram (LEXAPRO) 5 MG tablet  fosinopril (MONOPRIL) 10 MG tablet  memantine (NAMENDA) 10 MG tablet  metoprolol tartrate (LOPRESSOR) 50 MG tablet  psyllium (METAMUCIL) 58.6 % POWD  vitamin B complex with vitamin C (VITAMIN  B COMPLEX) TABS tablet  He is not currently anticoagulated.     Past Medical History:    Abnormal glucose   Atrial flutter    Diabetes mellitus, type  2   Hypertension   Hemorrhagic stroke    Hyperlipidemia     Sebaceous cyst   Seizure disorder    Skin cancer   Stroke  Syncope and collapse   TIA (transient ischemic attack)  Cerebral amyloid angiopathy    Past Surgical History:    Cardiac ablation  Soft tissue surgery  Arthrotomy shoulder, rotator cuff repair  Biopsy  Cardiac surgery  Cystoscopy, transurethral resection  Herniorrhaphy  Inguinal  Phacoemulsification  clear cornea with toric intraocular lens implant x2    Social History:  Primary care provider: Forest Ashraf  The patient presents to the ED with his wife, Alexsandra.     Physical Exam     Patient Vitals for the past 24 hrs:   BP Temp Temp src Pulse Resp SpO2   07/30/22 1900 (!) 178/101 -- -- 66 18 95 %   07/30/22 1830 -- -- -- -- -- 94 %   07/30/22 1655 127/85 98.7  F (37.1  C) Oral 74 18 99 %     Physical Exam  General: Well-developed and well-nourished. Well appearing elderly  man. Cooperative.  Head:  Atraumatic.  Eyes:  Conjunctivae, lids, and sclerae are normal.  Neck:  Supple. Normal range of motion.  CV:  Regular rate and rhythm. Normal heart sounds with no murmurs, rubs, or gallops detected.  Resp:  No respiratory distress. Clear to auscultation bilaterally without decreased breath sounds, wheezing, rales, or rhonchi.  GI:  Soft. Non-distended. Non-tender.    MS:  Mild pain with ROM of the left elbow. There are 2 large skin tears to the left elbow which are tender to palpation although there is no bony tenderness. There is no tenderness to the remainder of the extremities.   Skin:  Warm. Non-diaphoretic. No pallor. 1.5 cm curvilinear skin tear to the dorsum of the left hand. 5 cm L shaped skin tear to the left forearm. Flap like skin tear to the left elbow measuring at least 8 cm in total length.  Neuro:  Awake and alert. Normal strength.  Psych: Normal mood and affect. Normal speech.  Vitals reviewed.    Emergency Department Course     Imaging:  CT Head w/o Contrast   Final Result    IMPRESSION:   1.  No acute intracranial process.      Elbow  XR, G/E 3 views, left   Final Result   IMPRESSION: Anatomic alignment left elbow. No acute displaced left elbow fracture. 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 Loose body in the ventral left elbow joint measures approximately 1.2 cm. New small to moderate left elbow joint    effusion.        Report per radiology    Procedures:    Laceration Repair      Procedure: Laceration Repair    Indication: Laceration    Consent: Verbal    Location: Left Hand , Forearm  and Upper extremity     Length: 1.5 cm left hand, 5 cm forearm, 8 cm elbow     Preparation: Irrigation with Sterile Saline.    Anesthesia/Sedation: None.      Treatment/Exploration: Wound explored, no foreign bodies found     Closure: The wounds were closed with Steri-Strips    Patient Status: The patient tolerated the procedure well: Yes. There were no complications.    Emergency Department Course:    Reviewed:  I reviewed nursing notes, vitals, and past medical history.    Assessments:  1708 I obtained history and examined the patient as noted above.   1831 I rechecked the patient and placed Steri-Strips.     Interventions:  1745  acetaminophen (TYLENOL) tablet 1,000 mg, PO  1747  Tdap (tetanus-diphtheria-acell pertussis) (ADACEL) injection 0.5 mL, Intramuscular     Disposition:  The patient was discharged home.     Impression & Plan    Medical Decision Making:  Ernie is a 89-year-old man who had a unwitnessed fall at his home.  He tells me he remembers this fall without syncope or LOC but is uncertain exactly what happened.  His wife found his walker beside hers tipped over and believes he got the walkers intertwined.  He denies head injury and was able to get up and ambulate on his own at home.  He has no concerns aside from pain associated with skin tears to the left elbow.  He appears generally well on exam without evidence of head trauma.  However, given his advanced age and  because he cannot tell me exactly how the fall occurred, I did obtain a head CT.  Fortunately there is no intracranial hemorrhage or skull fracture.  There is no midline cervical spine tenderness or neck pain.  I performed x-ray of the left elbow which reveals no fracture or malalignment with a small to moderate left elbow joint effusion.  This may be contributing to his pain but most of it seems very superficial, stinging, burning type of pain associated with his skin tears.  These elbow skin tears as well as the left hand skin tear were cleaned and repaired with Steri-Strips, as above.  His pain was treated with Tylenol.  His tetanus was updated.  His wife plans on taking him for follow up with the wound care center as she has seen them for his skin tears in the past.  I think this is a reasonable option and I recommended they continue Tylenol as needed for pain.  I also recommended primary care follow-up but Ernie and Alexsandra understand indications for return with worsening symptoms or new concerns of any kind.  All questions answered.  Amenable to discharge.    Diagnosis:    ICD-10-CM    1. Fall in home, initial encounter  W19.XXXA     Y92.009    2. Skin tear of left hand without complication, initial encounter  S61.412A    3. Skin tear of left elbow without complication, initial encounter  S51.012A    4. Effusion of left elbow  M25.422      Discharge Medications:  Discharge Medication List as of 7/30/2022  6:47 PM        Scribe Disclosure:  I, Josefina Wyman, am serving as a scribe at 5:08 PM on 7/30/2022 to document services personally performed by Mary Vaughan MD based on my observations and the provider's statements to me.      Mary Vaughan MD  08/03/22 5546

## 2022-07-30 NOTE — DISCHARGE INSTRUCTIONS
Keep wounds clean and dry. It is a good idea to see the wound care clinic since he has been there before.  Tylenol as needed for pain.  Return with headache, confusion, vomiting, vision changes, chest pain, shortness of breath, uncontrolled bleeding, evidence of infection, or ANY other concerns.  Follow up with primary care provider.

## 2022-07-30 NOTE — ED NOTES
Bed: ED05  Expected date:   Expected time:   Means of arrival:   Comments:  Jane 2 89 M fall elbow injury ETA 1657

## 2022-07-30 NOTE — ED TRIAGE NOTES
BIBA from home for mechanical fall. Patient fell from standing to floor, skin tears present to L elbow and L hand. Bleeding controlled. Unwitnessed fall, no thinners. VSS on transport. Denies HA and vision changes.      Triage Assessment     Row Name 07/30/22 1700       Triage Assessment (Adult)    Airway WDL WDL       Respiratory WDL    Respiratory WDL WDL       Skin Circulation/Temperature WDL    Skin Circulation/Temperature WDL X   skin tear to L elbow and L hand       Cardiac WDL    Cardiac WDL WDL       Peripheral/Neurovascular WDL    Peripheral Neurovascular WDL WDL       Cognitive/Neuro/Behavioral WDL    Cognitive/Neuro/Behavioral WDL WDL

## 2022-08-01 NOTE — TELEPHONE ENCOUNTER
Okay to schedule with Dr. Lynch as a new patient given length of time since last visit.  Appears left elbow wound was also present November 2019.    Anand Crowder RN

## 2022-08-01 NOTE — TELEPHONE ENCOUNTER
Wife called to schedule an appointment for her . He previously say Dr Lynch in 2019 for a different wound. The new wound is on his left elbow.    [phone]

## 2022-08-01 NOTE — TELEPHONE ENCOUNTER
Patient scheduled for Tuesday 8/9. Patient's wife has a few wound related questions and is asking for a return call from a RN. She has also offered to text in pictures. (Patient was seen at [facility] previously, but due to amount of time passed is being scheduled as a new patient)      [phone]

## 2022-08-02 NOTE — TELEPHONE ENCOUNTER
Returned patient call, spoke with patient wife, Alexsandra who stated that a family friend had stopped by on Monday and clean patient wound and apply a new dressing to the wound.  She is a wound nurse from [facility] that came over on 8/1 to help with dressing change since dressings got saturated and wife didn't know what to do.  The wound nurse will visit Ernie tomorrow and over the weekend to check if wound has stopped draining and dressing is intact.  She will keep the 8/9 appointment with us.    Gave Alexsandra the phone number so she can text pictures to us.  She was at breakfast and will do it when she gets home.

## 2022-08-09 PROBLEM — S51.002D ELBOW WOUND, LEFT, SUBSEQUENT ENCOUNTER: Status: ACTIVE | Noted: 2022-01-01

## 2022-08-09 NOTE — PROGRESS NOTES
Patient called for a telephone visit. Certified Wound Care Nurse time spent evaluating patient record, completed a full evaluation and documented wound(s) & marleen-wound skin; provided recommendation based on treatment plan. Reviewed discharge instructions, patient education, and discussed plan of care with appropriate medical team staff members and patient and/or family members.

## 2022-08-09 NOTE — DISCHARGE INSTRUCTIONS
Ernie Leary      9/23/1924    Your next appointment is a telephone visit. Please text photos of your wounds the day before or the day of the appointment to [phone]. Please include a tape measure or ruler of some sort in your photo for reference.    Wound Dressing Change: Left Elbow  -Cleanse with gentle soap and water or wound cleanser, pat dry  -Apply 1/8th mepitel ag or urgotul ag to wound  -Then cover with 1 ABD and 1 roll gauze. Secure with tape  -Change daily     TAMIKA Lynch M.D. August 9, 2022    Call us at [phone] if you have any questions about your wounds, have redness or swelling around your wound, have a fever of 101 or greater or if you have any other problems or concerns. We answer the phone Monday through Friday 8 am to 4 pm, please leave a message as we check the voicemail frequently throughout the day.     If you had a positive experience please indicate that on your patient satisfaction survey form that [facility] will be sending you.    It was a pleasure meeting with you today.  Thank you for allowing me and my team the privilege of caring for you today.  YOU are the reason we are here, and I truly hope we provided you with the excellent service you deserve.  Please let us know if there is anything else we can do for you so that we can be sure you are leaving completely satisfied with your care experience.      If you have any billing related questions please call the [facility] Business office at [phone]. The clinic staff does not handle billing related matters.    If you are scheduled have a follow up appointment, you will receive a reminder call the day before your visit. If you are unable to keep that appointment, please call the clinic to cancel or reschedule.

## 2022-08-09 NOTE — PROGRESS NOTES
[facility] Wound Healing [place] Progress Note  Telehealth visit start 2:11 pm  End time 2: 21 pm  Subject: Ernie Leary, 89 yo male, dementia, cancelled today's appointment due to his dementia - fall on left elbow, ER note reviewed. Has a friend that is nurse, she is doing dressing changes    PMH:   Past Medical History:   Diagnosis Date     Abnormal glucose      Atrial flutter (H)     ablation      Diabetes mellitus (H)     not currently being treated, diet controlled at this time     Essential hypertension, benign      Hemorrhagic stroke (H)      Hyperlipidemia LDL goal <130 2011     Other and unspecified hyperlipidemia      Sebaceous cyst      Seizure disorder (H) 2017     Skin cancer     Dr. 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)     Transient cerebral ischemia     Cerebral amyloid angiopathy (H)     History of intracranial hemorrhage     TIA (transient ischemic attack)     Seizure disorder (H)     Diabetes mellitus, type 2 (H)     Elbow wound, left, subsequent encounter     Social Hx:   Social History     Socioeconomic History     Marital status:      Spouse name: nicholas freeman     Number of children: 3     Years of education: Not on file     Highest education level: Not on file   Occupational History     Not on file   Tobacco Use     Smoking status: Former Smoker     Packs/day: 2.00     Years: 30.00     Pack years: 60.00     Types: Cigarettes     Quit date: 1981     Years since quittin.6     Smokeless tobacco: Never  Used   Substance and Sexual Activity     Alcohol use: No     Alcohol/week: 0.0 standard drinks     Drug use: No     Sexual activity: Not Currently     Partners: Female   Other Topics Concern      Service Not Asked     Blood Transfusions Not Asked     Caffeine Concern Not Asked     Occupational Exposure Not Asked     Hobby Hazards Yes     Comment: yard work     Sleep Concern Not Asked     Stress Concern No     Weight Concern No     Special Diet Yes     Back Care Not Asked     Exercise No     Bike Helmet Not Asked     Seat Belt Not Asked     Self-Exams Not Asked     Parent/sibling w/ CABG, MI or angioplasty before 65F 55M? Not Asked   Social History Narrative     to Alexsandra Carey     Social Determinants of Health     Financial Resource Strain: Not on file   Food Insecurity: Not on file   Transportation Needs: Not on file   Physical Activity: Not on file   Stress: Not on file   Social Connections: Not on file   Intimate Partner Violence: Not on file   Housing Stability: Not on file       Surgical Hx:   Past Surgical History:   Procedure Laterality Date     ------------OTHER-------------  1/1/2008    Cardiac ablation     ADJACENT TISSUE TRANSFER, FOREHEAD/CHEEKS/CHIN/MOUTH/NECK/AXILLAE/GENITALIA/HANDS/FEET, < OR = 10CM       ARTHROTOMY SHOULDER, ROTATOR CUFF REPAIR, COMBINED  11/18/2011    Procedure:COMBINED ARTHROTOMY SHOULDER, ROTATOR CUFF REPAIR; LEFT SHOULDER OPEN ROTATOR CUFF REPAIR, BURSECTOMY, DISTAL CLAVICLE RESECTION; Surgeon:KHALIDA ZABALA; Location: OR     BIOPSY      multiple skin biopsies for skin cancers     CARDIAC SURGERY       CYSTOSCOPY, TRANSURETHRAL RESECTION (TUR) PROSTATE, COMBINED  8/15/2012    Procedure: COMBINED CYSTOSCOPY, TRANSURETHRAL RESECTION (TUR) PROSTATE;  CYSTOSCOPY, TRANSURETHRAL RESECTION  OF  PROSTATE WITH PLASMA LOOP;  Surgeon: Harmeet Kilgore MD;  Location:  OR     HERNIORRHAPHY INGUINAL Right 5/29/2015    Procedure: HERNIORRHAPHY INGUINAL;  Surgeon: Arline  Scottie LEES MD;  Location:  [facility]     PHACOEMULSIFICATION CLEAR CORNEA WITH TORIC INTRAOCULAR LENS IMPLANT Right 7/24/2017    Procedure: PHACOEMULSIFICATION CLEAR CORNEA WITH TORIC INTRAOCULAR LENS IMPLANT;  RIGHT PHACOEMULSIFICATION CLEAR CORNEA WITH TORIC INTRAOCULAR LENS IMPLANT ;  Surgeon: Yoel Calero MD;  Location: [facility]     PHACOEMULSIFICATION CLEAR CORNEA WITH TORIC INTRAOCULAR LENS IMPLANT Left 8/21/2017    Procedure: PHACOEMULSIFICATION CLEAR CORNEA WITH TORIC INTRAOCULAR LENS IMPLANT;  LEFT PHACOEMULSIFICATION CLEAR CORNEA WITH TORIC INTRAOCULAR LENS IMPLANT ;  Surgeon: Yoel Calero MD;  Location: [facility]       Allergies:    Allergies   Allergen Reactions     Adrenalin [Epinephrine Hcl]      Hctz      hyponatremia       Medications:   Current Outpatient Medications   Medication     amLODIPine (NORVASC) 2.5 MG tablet     calcium 600 MG tablet     calcium carbonate (TUMS) 500 MG chewable tablet     calcium carbonate 500 mg, elemental, 1250 (500 Ca) MG tablet chewable     Cholecalciferol (VITAMIN D) 2000 UNITS tablet     COENZYME Q-10 PO     CYANOCOBALAMIN PO     escitalopram (LEXAPRO) 5 MG tablet     fosinopril (MONOPRIL) 10 MG tablet     memantine (NAMENDA) 10 MG tablet     metoprolol tartrate (LOPRESSOR) 50 MG tablet     prune juice LIQD     psyllium (METAMUCIL) 58.6 % POWD     vitamin B complex with vitamin C (VITAMIN  B COMPLEX) TABS tablet     No current facility-administered medications for this encounter.       Labs:   Recent Labs   Lab Test 03/16/22  1436 05/10/19  1116 06/26/18  0815   ALBUMIN  --   --  4.0   HGB 12.6*   < > 13.8   WBC 7.2   < > 6.5   A1C 6.9*   < > 6.3*    < > = values in this interval not displayed.     Creatinine   Date Value Ref Range Status   03/16/2022 1.16 0.66 - 1.25 mg/dL Final   03/18/2021 1.02 0.66 - 1.25 mg/dL Final     GFR Estimate   Date Value Ref Range Status   03/16/2022 57 (L) >60 mL/min/1.73m2 Final     Comment:     Effective December 21, 2021 eGFRcr in adults  is calculated using the 2021 CKD-EPI creatinine equation which includes age and gender (Glendy cobb al., [place], DOI: 10.1056/ORRLnd0046563)   03/18/2021 62 >60 mL/min/[1.73_m2] Final     Comment:     Non  GFR Calc  Starting 12/18/2018, serum creatinine based estimated GFR (eGFR) will be   calculated using the Chronic Kidney Disease Epidemiology Collaboration   (CKD-EPI) equation.       GFR Estimate If Black   Date Value Ref Range Status   03/18/2021 71 >60 mL/min/[1.73_m2] Final     Comment:      GFR Calc  Starting 12/18/2018, serum creatinine based estimated GFR (eGFR) will be   calculated using the Chronic Kidney Disease Epidemiology Collaboration   (CKD-EPI) equation.       Lab Results   Component Value Date    WBC 7.2 03/16/2022    WBC 7.5 03/18/2021     Lab Results   Component Value Date    RBC 4.16 03/16/2022    RBC 4.49 03/18/2021     Lab Results   Component Value Date    HGB 12.6 03/16/2022    HGB 13.5 03/18/2021     Lab Results   Component Value Date    HCT 38.7 03/16/2022    HCT 40.4 03/18/2021     No components found for: MCT  Lab Results   Component Value Date    MCV 93 03/16/2022    MCV 90 03/18/2021     Lab Results   Component Value Date    MCH 30.3 03/16/2022    MCH 30.1 03/18/2021     Lab Results   Component Value Date    MCHC 32.6 03/16/2022    MCHC 33.4 03/18/2021     Lab Results   Component Value Date    RDW 13.8 03/16/2022    RDW 13.7 03/18/2021     Lab Results   Component Value Date     03/16/2022     03/18/2021          Nutrition requirements were discussed with patient today.  Objective:  There were no vitals taken for this visit.  Wound (used by OP [facility] only) 08/09/22 1404 Left elbow (Active)   Thickness/Stage full thickness 08/09/22 1400   Base granulating;clean;bleeding 08/09/22 1400   Periwound ecchymotic 08/09/22 1400   Periwound Temperature warm 08/09/22 1400   Periwound Skin Turgor soft 08/09/22 1400   Edges open 08/09/22 1400   Length (cm) 8  08/09/22 1400   Width (cm) 4 08/09/22 1400   Depth (cm) 0.1 08/09/22 1400   Wound (cm^2) 32 cm^2 08/09/22 1400   Wound Volume (cm^3) 3.2 cm^3 08/09/22 1400   Drainage Characteristics/Odor serosanguineous 08/09/22 1400   Drainage Amount moderate 08/09/22 1400   Care, Wound non-select wound debridement performed 08/09/22 1400        telehealth visit with caregiver wife, patient dementia, nonhistorian -       Impression: left elbow wound from fall, dementia    Plan:  Friend from [facility] wound clinic doing dressing changes, wound progressing; We will dress the wounds with wound wash  Patient will return to the clinic in 2 weeks time for telehealth visit      Further instructions from your care team       Ernie Leary      9/23/1924    Your next appointment is a telephone visit. Please text photos of your wounds the day before or the day of the appointment to [phone]. Please include a tape measure or ruler of some sort in your photo for reference.    Wound Dressing Change: Left Elbow  -Cleanse with gentle soap and water or wound cleanser, pat dry  -Apply 1/8th mepitel ag or urgotul ag to wound  -Then cover with 1 ABD and 1 roll gauze. Secure with tape  -Change daily     TAMIKA Lynch M.D. August 9, 2022    Call us at [phone] if you have any questions about your wounds, have redness or swelling around your wound, have a fever of 101 or greater or if you have any other problems or concerns. We answer the phone Monday through Friday 8 am to 4 pm, please leave a message as we check the voicemail frequently throughout the day.     If you had a positive experience please indicate that on your patient satisfaction survey form that [facility] will be sending you.    It was a pleasure meeting with you today.  Thank you for allowing me and my team the privilege of caring for you today.  YOU are the reason we are here, and I truly hope we provided you with the excellent service you deserve.  Please let us know if  there is anything else we can do for you so that we can be sure you are leaving completely satisfied with your care experience.      If you have any billing related questions please call the [facility] Business office at [phone]. The clinic staff does not handle billing related matters.    If you are scheduled have a follow up appointment, you will receive a reminder call the day before your visit. If you are unable to keep that appointment, please call the clinic to cancel or reschedule.            Bertin Lynch MD on 8/9/2022 at 2:14 PM    No images are attached to the encounter.image sent by spouse    Dictated using Dragon voice recognition software which may result in transcription errors

## 2022-08-11 NOTE — ADDENDUM NOTE
Encounter addended by: Lucina Morelos RN on: 8/11/2022 2:14 PM   Actions taken: Edited Discharge Instructions

## 2022-08-11 NOTE — TELEPHONE ENCOUNTER
Returned call to Alexsandra. She reports she is going to start changing the dressing daily and is in need of supplies. DME order placed to [facility]. No further questions or concerns.

## 2022-08-23 NOTE — PROGRESS NOTES
[facility] Wound Healing [place] Progress Note  [facility] 7:40  endtime 7:43  Subject: Ernie Leary closed elbow wound, left  Using aquaphor daily    Patient Active Problem List   Diagnosis     Essential hypertension, benign     Impotence of organic origin     Atrial fibrillation (H)     Normocytic anemia     Hyperlipidemia LDL goal <100     Benign prostatic hyperplasia without lower urinary tract symptoms     BPH (benign prostatic hyperplasia)     Anemia     IFG (impaired fasting glucose)     Microalbuminuria     HTN (hypertension)     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)     Transient cerebral ischemia     Cerebral amyloid angiopathy (H)     History of intracranial hemorrhage     TIA (transient ischemic attack)     Seizure disorder (H)     Diabetes mellitus, type 2 (H)     Elbow wound, left, subsequent encounter     Past Medical History:   Diagnosis Date     Abnormal glucose      Atrial flutter (H)     ablation 2008     Diabetes mellitus (H)     not currently being treated, diet controlled at this time     Essential hypertension, benign      Hemorrhagic stroke (H) 2012     Hyperlipidemia LDL goal <130 2/7/2011     Other and unspecified hyperlipidemia      Sebaceous cyst      Seizure disorder (H) 7/21/2017     Skin cancer     Dr. Tucker     Stroke (H)      Syncope and collapse 11/15/01     Exam:  There were no vitals taken for this visit.  Wound (used by OP [facility] only) 08/09/22 1404 Left elbow (Active)   Thickness/Stage full thickness 08/23/22 0726   Base granulating;clean;bleeding 08/23/22 0726   Periwound ecchymotic 08/23/22 0726   Periwound Temperature warm 08/23/22 0726   Periwound Skin Turgor soft 08/23/22 0726   Edges open 08/23/22 0726   Length (cm) 8 08/23/22 0726   Width (cm) 4 08/23/22 0726   Depth (cm) 0.1 08/23/22 0726   Wound (cm^2) 32 cm^2 08/23/22 0726   Wound Volume (cm^3) 3.2 cm^3 08/23/22 0726   Wound healing % 0 08/23/22 0726   Drainage Characteristics/Odor  serosanguineous 08/23/22 0726   Drainage Amount moderate 08/23/22 0726   Care, Wound non-select wound debridement performed 08/23/22 0726     telehealth        Impression: healed elbow wound, left, from trauma-fall    Plan: daily moisturizing lotion bid, f/u prn      Further instructions from your care team           [facility]  [address] [place] [address]  Appointment Phone [phone]     Ernie Leary      9/23/1924    Your wound is healed!! Dressings are no longer required.     Avoid pressure to elbow  Aquaphor twice daily    Wound Clinic follow up in the future if there are any wound concerns     TAMIKA Lynch M.D. August 23, 2022    Call us at [phone] if you have any questions about your wounds, have redness or swelling around your wound, have a fever of 101 or greater or if you have any other problems or concerns. We answer the phone Monday through Friday 8 am to 4 pm, please leave a message as we check the voicemail frequently throughout the day.                  Bertin Lynch MD on 8/23/2022 at 7:39 AM    Dictated using Dragon voice recognition software which may result in transcription errors

## 2022-08-23 NOTE — PROGRESS NOTES
Patient arrived for wound care visit. Certified Wound Care Nurse time spent evaluating patient record, and completed a full evaluation; provided recommendation based on treatment plan. Reviewed discharge instructions, patient education, and discussed plan of care with appropriate medical team staff members and patient and/or family members.

## 2022-08-23 NOTE — DISCHARGE INSTRUCTIONS
[facility]  [address] [address] [address]  Appointment Phone [phone]     Ernie Leary      9/23/1924    Your wound is healed!! Dressings are no longer required.     Avoid pressure to elbow  Aquaphor twice daily    Wound Clinic follow up in the future if there are any wound concerns     TAMIKA Lynch M.D. August 23, 2022    Call us at [phone] if you have any questions about your wounds, have redness or swelling around your wound, have a fever of 101 or greater or if you have any other problems or concerns. We answer the phone Monday through Friday 8 am to 4 pm, please leave a message as we check the voicemail frequently throughout the day.

## 2022-09-13 NOTE — PROGRESS NOTES
"SUBJECTIVE:   Ernie is a 89 year old who presents for Preventive Visit.      Patient has been advised of split billing requirements and indicates understanding: Yes  Per wife.  Are you in the first 12 months of your Medicare coverage?  No    Healthy Habits:     In general, how would you rate your overall health?  Good    Frequency of exercise:  None    Do you usually eat at least 4 servings of fruit and vegetables a day, include whole grains    & fiber and avoid regularly eating high fat or \"junk\" foods?  No    Ability to successfully perform activities of daily living:  Telephone requires assistance, transportation requires assistance, shopping requires assistance, preparing meals requires assistance, housework requires assistance, bathing requires assistance, laundry requires assistance, medication administration requires assistance and money management requires assistance    Home Safety:  No safety concerns identified    Hearing Impairment:  Difficulty following a conversation in a noisy restaurant or crowded room, feel that people are mumbling or not speaking clearly, difficulty following dialogue in the theater and difficulty understanding soft or whispered speech    In the past 6 months, have you been bothered by leaking of urine?  No    In general, how would you rate your overall mental or emotional health?  Fair      PHQ-2 Total Score: 4    Do you feel safe in your environment? Yes    Have you ever done Advance Care Planning? (For example, a Health Directive, POLST, or a discussion with a medical provider or your loved ones about your wishes): Yes, advance care planning is on file.       Fall risk  Fallen 2 or more times in the past year?: Yes  Any fall with injury in the past year?: Yes    Cognitive Screening   1) Repeat 3 items (Leader, Season, Table)    2) Clock draw: not able   3) 3 item recall:  Wife said memory is not good, not able to complete.  Results: Wife states memory not good, not able to test / " john     Mini-CogTM Copyright S Luke. Licensed by the author for use in [facility]; reprinted with permission ([email or web address]). All rights reserved.      Do you have sleep apnea, excessive snoring or daytime drowsiness?: no    Reviewed and updated as needed this visit by clinical staff   Tobacco  Allergies                 Reviewed and updated as needed this visit by Provider                   Social History     Tobacco Use     Smoking status: Former Smoker     Packs/day: 2.00     Years: 30.00     Pack years: 60.00     Types: Cigarettes     Start date: 1946     Quit date: 1981     Years since quittin.7     Smokeless tobacco: Never Used   Substance Use Topics     Alcohol use: Not Currently     If you drink alcohol do you typically have >3 drinks per day or >7 drinks per week? No    Alcohol Use 2022   Prescreen: >3 drinks/day or >7 drinks/week? -   Prescreen: >3 drinks/day or >7 drinks/week? No         Current providers sharing in care for this patient include:   Patient Care Team:  Forest Ashraf MD as PCP - General (Internal Medicine)  Forest Ashraf MD as Assigned PCP    The following health maintenance items are reviewed in Epic and correct as of today:  Health Maintenance   Topic Date Due     EYE EXAM  Never done     A1C  2022     INFLUENZA VACCINE (1) 2022     PHQ-9  2023     BMP  2023     LIPID  2023     MICROALBUMIN  2023     DIABETIC FOOT EXAM  2023     ANNUAL REVIEW OF HM ORDERS  2023     MEDICARE ANNUAL WELLNESS VISIT  2023     FALL RISK ASSESSMENT  2023     ADVANCE CARE PLANNING  2027     DTAP/TDAP/TD IMMUNIZATION (5 - Td or Tdap) 2032     Pneumococcal Vaccine: 65+ Years  Completed     ZOSTER IMMUNIZATION  Completed     COVID-19 Vaccine  Completed     IPV IMMUNIZATION  Aged Out     MENINGITIS IMMUNIZATION  Aged Out     Patient Active Problem List   Diagnosis     Essential hypertension, benign      Impotence of organic origin     Atrial fibrillation (H)     Normocytic anemia     Hyperlipidemia LDL goal <100     Benign prostatic hyperplasia without lower urinary tract symptoms     BPH (benign prostatic hyperplasia)     Anemia     IFG (impaired fasting glucose)     Microalbuminuria     HTN (hypertension)     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)     Transient cerebral ischemia     Cerebral amyloid angiopathy (H)     History of intracranial hemorrhage     TIA (transient ischemic attack)     Seizure disorder (H)     Diabetes mellitus, type 2 (H)     Elbow wound, left, subsequent encounter     Past Surgical History:   Procedure Laterality Date     ------------OTHER-------------  01/01/2008    Cardiac ablation     ADJACENT TISSUE TRANSFER, FOREHEAD/CHEEKS/CHIN/MOUTH/NECK/AXILLAE/GENITALIA/HANDS/FEET, < OR = 10CM       ARTHROTOMY SHOULDER, ROTATOR CUFF REPAIR, COMBINED  11/18/2011    Procedure:COMBINED ARTHROTOMY SHOULDER, ROTATOR CUFF REPAIR; LEFT SHOULDER OPEN ROTATOR CUFF REPAIR, BURSECTOMY, DISTAL CLAVICLE RESECTION; Surgeon:KHALIDA ZABALA; Location: OR     BIOPSY      multiple skin biopsies for skin cancers     CARDIAC SURGERY       COSMETIC SURGERY  2008    Removal of squamous carcinoma behind ear     CYSTOSCOPY, TRANSURETHRAL RESECTION (TUR) PROSTATE, COMBINED  08/15/2012    Procedure: COMBINED CYSTOSCOPY, TRANSURETHRAL RESECTION (TUR) PROSTATE;  CYSTOSCOPY, TRANSURETHRAL RESECTION  OF  PROSTATE WITH PLASMA LOOP;  Surgeon: Harmeet Kilgore MD;  Location: [facility]     EYE SURGERY  2018    2 Cataracts     HERNIA REPAIR  2014     HERNIORRHAPHY INGUINAL Right 05/29/2015    Procedure: HERNIORRHAPHY INGUINAL;  Surgeon: Scottie Nunn MD;  Location: [facility]     ORTHOPEDIC SURGERY  2011    Shoulder tendon reattached     PHACOEMULSIFICATION CLEAR CORNEA WITH TORIC INTRAOCULAR LENS IMPLANT Right 07/24/2017    Procedure: PHACOEMULSIFICATION CLEAR CORNEA WITH TORIC INTRAOCULAR LENS IMPLANT;  RIGHT  PHACOEMULSIFICATION CLEAR CORNEA WITH TORIC INTRAOCULAR LENS IMPLANT ;  Surgeon: Yoel Calero MD;  Location: [facility]     PHACOEMULSIFICATION CLEAR CORNEA WITH TORIC INTRAOCULAR LENS IMPLANT Left 2017    Procedure: PHACOEMULSIFICATION CLEAR CORNEA WITH TORIC INTRAOCULAR LENS IMPLANT;  LEFT PHACOEMULSIFICATION CLEAR CORNEA WITH TORIC INTRAOCULAR LENS IMPLANT ;  Surgeon: Yoel Calero MD;  Location: [facility]       Social History     Tobacco Use     Smoking status: Former Smoker     Packs/day: 2.00     Years: 30.00     Pack years: 60.00     Types: Cigarettes     Start date: 1946     Quit date: 1981     Years since quittin.7     Smokeless tobacco: Never Used   Substance Use Topics     Alcohol use: Not Currently     Family History   Problem Relation Age of Onset     Coronary Artery Disease Father          of heart attack     Anxiety Disorder Sister          Current Outpatient Medications   Medication Sig Dispense Refill     amLODIPine (NORVASC) 2.5 MG tablet TAKE 1 TABLET BY MOUTH TWICE A  tablet 2     calcium 600 MG tablet Take 1 tablet by mouth every other day  180 tablet 3     calcium carbonate (TUMS) 500 MG chewable tablet Take 1 chew tab by mouth daily as needed.       calcium carbonate 500 mg, elemental, 1250 (500 Ca) MG tablet chewable        Cholecalciferol (VITAMIN D) 2000 UNITS tablet Take 2,000 Units by mouth daily (Patient taking differently: Take 2,000 Units by mouth every other day) 100 tablet 3     COENZYME Q-10 PO Take 100 mg by mouth every 14 days        CYANOCOBALAMIN PO Take 1,000 mcg by mouth daily.       escitalopram (LEXAPRO) 5 MG tablet Take 5 mg by mouth daily       fosinopril (MONOPRIL) 10 MG tablet Take 1 tablet (10 mg) by mouth daily 90 tablet 3     memantine (NAMENDA) 10 MG tablet Take 10 mg by mouth       metoprolol tartrate (LOPRESSOR) 50 MG tablet TAKE 1/2 TABLET BY MOUTH TWICE A DAY 90 tablet 3     prune juice LIQD Take  by mouth daily (with breakfast).    "    psyllium (METAMUCIL) 58.6 % POWD Take 1 teaspoonful by mouth 3 times daily as needed. Constipation.       vitamin B complex with vitamin C (STRESS TAB) tablet Take 1 tablet by mouth daily       Allergies   Allergen Reactions     Adrenalin [Epinephrine Hcl]      Hctz      hyponatremia             Review of Systems   Constitutional: Negative for chills and fever.   HENT: Positive for congestion and hearing loss. Negative for ear pain and sore throat.    Eyes: Negative for pain and visual disturbance.   Respiratory: Negative for cough.    Cardiovascular: Negative for chest pain, palpitations and peripheral edema.   Gastrointestinal: Negative for abdominal pain, constipation, diarrhea, heartburn, hematochezia and nausea.   Genitourinary: Positive for impotence. Negative for dysuria, frequency, genital sores, hematuria, penile discharge and urgency.   Musculoskeletal: Negative for arthralgias, joint swelling and myalgias.   Skin: Negative for rash.   Neurological: Positive for weakness. Negative for dizziness, headaches and paresthesias.   Psychiatric/Behavioral: Negative for mood changes. The patient is not nervous/anxious.          OBJECTIVE:   /72 (BP Location: Right arm, Patient Position: Standing, Cuff Size: Adult Regular)   Pulse 65   Temp 97.1  F (36.2  C) (Temporal)   Resp 16   Ht 1.67 m (5' 5.75\")   Wt 81.6 kg (180 lb)   SpO2 98%   BMI 29.27 kg/m   Estimated body mass index is 29.27 kg/m  as calculated from the following:    Height as of this encounter: 1.67 m (5' 5.75\").    Weight as of this encounter: 81.6 kg (180 lb).  Physical Exam  Physical Exam   GENERAL: healthy, alert and no distress  HEENT:  Normal bilateral TMs  NECK: no adenopathy, no asymmetry, masses, or scars and thyroid normal to palpation  RESP: lungs clear to auscultation - no rales, rhonchi or wheezes  CV: The heart has an irregularly irregular rhythm with a normal rate.   ABDOMEN: soft, nontender, no hepatosplenomegaly, no " masses and bowel sounds normal  MS: no gross musculoskeletal defects noted, no edema  NEURO: Very forgetful, does not say much, moves all extremities, grossly symmetric strength, uses walker  PSYCH: Appropriate affect well groomed         ASSESSMENT / PLAN:       ICD-10-CM    1. Routine general medical examination at a health care facility  Z00.00    2. Essential hypertension, benign  I10    3. Need for prophylactic vaccination and inoculation against influenza  Z23      Overall, I think he is doing OK  Wife is managing to take care of him with intermittent outside help at [address]  Second blood pressure reading in clinic sitting was improved and standing blood pressure is OK   Flu shot today         Appropriate preventive services were discussed with this patient, including applicable screening as appropriate for cardiovascular disease, diabetes, osteopenia/osteoporosis, and glaucoma.  As appropriate for age/gender, discussed screening for colorectal cancer, prostate cancer, breast cancer, and cervical cancer. Checklist reviewing preventive services available has been given to the patient.    Reviewed patients plan of care and provided an AVS. The Basic Care Plan (routine screening as documented in Health Maintenance) for Ernie meets the Care Plan requirement. This Care Plan has been established and reviewed with the Patient and spouse.    Counseling Resources:  ATP IV Guidelines  Pooled Cohorts Equation Calculator  Breast Cancer Risk Calculator  Breast Cancer: Medication to Reduce Risk  FRAX Risk Assessment  ICSI Preventive Guidelines  Dietary Guidelines for Americans, 2010  [employer]'s MyPlate  ASA Prophylaxis  Lung CA Screening    Forest Ashraf MD  [facility]    Identified Health Risks:  Answers for HPI/ROS submitted by the patient on 9/9/2022  If you checked off any problems, how difficult have these problems made it for you to do your work, take care of things at home, or get along with  other people?: Somewhat difficult  PHQ9 TOTAL SCORE: 14

## 2022-09-16 NOTE — TELEPHONE ENCOUNTER
Routing refill request to provider for review/approval because:  Please confirm correct directions. Once or twice daily?  Shae Carvajal RN

## 2022-09-23 NOTE — TELEPHONE ENCOUNTER
See triage telephone encounter. [employer] message sent to the patient advising to call triage. Shae Carvajal RN

## 2022-09-23 NOTE — TELEPHONE ENCOUNTER
Ernie Leary A1C overdue lab  9/21/2022 10:36 AM Reply    To: CS TRIAGE IM      From: Ernie Leary      Created: 9/21/2022 10:36 AM        *-*-*This message was handled on 9/23/2022 2:02 PM by SHAE DUVAL*-*-*         Ernie's chart says his A1C lab is overdue since June.  His last reading was in the diabetic range.  Lately he has been having times when he gets very weak and can't walk which I think could be connected to untreated diabetes.  I will try to be more careful with his diet but is there a medication that he should be on, or should I be testing as I don't want him to continue to fall when these weak spells come on.  I feel this is important   Alexsandra Carey Ernie's wife        Patient was seen for CPE on 9/13. Patient is no worse than he was on 9/13.    Sometimes will be walking with his walker and gets so weak he can't walk. Wife states that he has fallen a couple of times. Last fell in August. Most recent episode of weakness was the day before yesterday.    Wife is requesting an A1C. Wondering if the patient should have a glucometer?    Nurse Triage SBAR    Is this a 2nd Level Triage? YES, LICENSED PRACTITIONER REVIEW IS REQUIRED    Situation: Patient has unexplained episodes of weakness that improve with rest or eating something.    Background: Large hemorraghic strok in 2012.  Patient is on 3 blood pressure medications.  Last A1C 6.9    Assessment: unexplained weakness as reported by wife    Protocol Recommended Disposition:   See in Office Today    Recommendation: OK to be seen next week due to no symptoms at time of call and has been ongoing.   Wife states that she can get ride for appointments after 1:30 if possible.    Can we leave a detailed message on this number? YES  Phone number patient can be reached at: Cell number on file:    Telephone Information:   Mobile [phone]     Shae Duval, RN  [facility] Triage       Routed to provider    Does the patient meet one of  the following criteria for ADS visit consideration? 16+ years old, with an [facility] PCP     TIP  Providers, please consider if this condition is appropriate for management at one of our Acute and Diagnostic Services sites.     If patient is a good candidate, please use dotphrase <dot>triageresponse and select Refer to ADS to document.        Reason for Disposition    Taking a medicine that could cause weakness (e.g., blood pressure medications, diuretics)    Additional Information    Negative: SEVERE difficulty breathing (e.g., struggling for each breath, speaks in single words)    Negative: Shock suspected (e.g., cold/pale/clammy skin, too weak to stand, low BP, rapid pulse)    Negative: Difficult to awaken or acting confused (e.g., disoriented, slurred speech)    Negative: Fainted > 15 minutes ago and still feels too weak or dizzy to stand    Negative: SEVERE weakness (i.e., unable to walk or barely able to walk, requires support) and new-onset or worsening    Negative: Sounds like a life-threatening emergency to the triager    Negative: Weakness of the face, arm or leg on one side of the body    Negative: Has diabetes mellitus and weakness from low blood sugar (i.e., < 60 mg/dL or 3.5 mmol/L)    Negative: Recent heat exposure, suspected cause of weakness    Negative: Vomiting is main symptom    Negative: Diarrhea is main symptom    Negative: Difficulty breathing    Negative: Heart beating < 50 beats per minute OR > 140 beats per minute    Negative: Extra heartbeats OR irregular heart beating (i.e., 'palpitations')    Negative: Follows bleeding (e.g., from vomiting, rectum, vagina)  (Exception: Small transient weakness from sight of a small amount blood.)    Negative: Bloody, black, or tarry bowel movements  (Exception: Chronic-unchanged black-grey bowel movements and is taking iron pills or Pepto-Bismol.)    Negative: MODERATE weakness from poor fluid intake with no improvement after 2 hours of rest and fluids     "Negative: Drinking very little and dehydration suspected (e.g., no urine > 12 hours, very dry mouth, very lightheaded)    Negative: Patient sounds very sick or weak to the triager    Negative: MODERATE weakness (i.e., interferes with work, school, normal activities) and cause unknown  (Exceptions: Weakness with acute minor illness, or weakness from poor fluid intake.)    Negative: Fever > 103 F  (39.4 C) and not able to get the Fever down using CARE ADVICE    Negative: Fever > 100.0 F (37.8 C) and bedridden (e.g., nursing home patient, stroke, chronic illness, recovering from surgery)    Negative: Fever > 101 F (38.3 C) and over 60 years of age    Negative: Fever > 100.0 F (37.8 C) and diabetes mellitus or weak immune system (e.g., HIV positive, cancer chemo, splenectomy, organ transplant, chronic steroids)    Negative: Pale skin (pallor)    Negative: MODERATE weakness (i.e., interferes with work, school, normal activities) and persists > 3 days    Answer Assessment - Initial Assessment Questions  1. DESCRIPTION: \"Describe how you are feeling.\"      Gets so tired/weak sometimes he cannot walk.   2. SEVERITY: \"How bad is it?\"  \"Can you stand and walk?\"    - MILD - Feels weak or tired, but does not interfere with work, school or normal activities    - MODERATE - Able to stand and walk; weakness interferes with work, school, or normal activities    - SEVERE - Unable to stand or walk      Patient walks with a walker. At times too weak to walk.   3. ONSET:  \"When did the weakness begin?\"      Off and on for 3 months. Using a walker with a seat.   4. CAUSE: \"What do you think is causing the weakness?\"      Diabetes?  5. MEDICINES: \"Have you recently started a new medicine or had a change in the amount of a medicine?\"      Lisinopril was decreased last March.   6. OTHER SYMPTOMS: \"Do you have any other symptoms?\" (e.g., chest pain, fever, cough, SOB, vomiting, diarrhea, bleeding, other areas of pain)      Sometimes right " "shoulder pain when he eats  7. PREGNANCY: \"Is there any chance you are pregnant?\" \"When was your last menstrual period?\"      NA    Protocols used: WEAKNESS (GENERALIZED) AND FATIGUE-A-DAVID Carvajal RN      "

## 2022-09-23 NOTE — TELEPHONE ENCOUNTER
"Call to Alexsandra, patient's wife, on consent to communicate. Alexsandra informed of Dr. Ashraf's below response. Alexsandra verbalized understanding and states she and the patient will call back to schedule a lab only appointment if they decide to pursue having his A1c checked.   Alexsandra will bring patient to ED or [facility] if she has concerns about patient's symptoms. She reports that right now \"he's actually doing pretty good.\"    Bessy Aburto RN BSN MSN  [facility]    "

## 2022-09-23 NOTE — TELEPHONE ENCOUNTER
He can certainly come in for a hemoglobin A1c, unfortunately, I don't think that uncontrolled diabetes is the cause for his symptoms, I don't think there is any easy fix for his symptoms

## 2022-10-05 PROBLEM — S81.812A SKIN TEAR OF LEFT LOWER LEG WITHOUT COMPLICATION: Status: ACTIVE | Noted: 2022-01-01

## 2022-10-05 NOTE — DISCHARGE INSTRUCTIONS
Ernie Leary      9/23/1924    A DME order for supplies has been placed to [facility]. If there are any issues with your order including not receiving the order please call [facility] at [phone] option 3. They can also provide a tracking number for you if you had supplies shipped to you.    Wound Dressing Change: Left lateral lower leg  Cleanse with mild unscented soap and water  Apply Medline hydrogel to wound then cover with 1 4x4 mepilex border  Change three times a week    Your next appointment is a telephone visit. Please text photos of your wounds the day before or the day of the appointment to [phone]. Please include a tape measure or ruler of some sort in your photo for reference.       Adriana Salmon PA-C October 5, 2022    Call us at [phone] if you have any questions about your wounds, have redness or swelling around your wound, have a fever of 101 or greater or if you have any other problems or concerns. We answer the phone Monday through Friday 8 am to 4 pm, please leave a message as we check the voicemail frequently throughout the day.     If you had a positive experience please indicate that on your patient satisfaction survey form that [facility] will be sending you.    It was a pleasure meeting with you today.  Thank you for allowing me and my team the privilege of caring for you today.  YOU are the reason we are here, and I truly hope we provided you with the excellent service you deserve.  Please let us know if there is anything else we can do for you so that we can be sure you are leaving completely satisfied with your care experience.      If you have any billing related questions please call the [facility] Business office at [phone]. The clinic staff does not handle billing related matters.    If you are scheduled to have a follow up appointment, you will receive a reminder call the day before your visit. On the appointment day please arrive  15 minutes prior to your appointment time. If you are unable to keep that appointment, please call the clinic to cancel or reschedule. If you are more than 10 minutes late or greater for your appointment, the clinic policy is that you may be asked to reschedule.'

## 2022-10-05 NOTE — PROGRESS NOTES
"[facility]    ASSESSMENT:   1. Full thickness traumatic left lower leg wound with fat layer exposed    PLAN/DISCUSSION:   1. Wound care plan: dress with hydrogel and Mepilex  2. See bottom of note for detailed wound care and patient instructions    HISTORY OF PRESENT ILLNESS:   Ernie Leary is a 89 year old male who presents today for a traumatic left lower leg wound. He struck his leg about 10 days ago and developed a \"blood blister.\" This has subsequently drained. His wife was concerned about the area as she had noticed redness around the area. He has type 2 DM but no known peripheral vascular hx.      VITALS: BP (!) 156/80 (BP Location: Left arm, Patient Position: Sitting)   Pulse 86   Temp 96.9  F (36.1  C) (Temporal)   Resp 16      PHYSICAL EXAM:  GENERAL: Patient is alert and oriented and in no acute distress  CV: palpable but diminished pedal pulses  INTEGUMENTARY:   Wound (used by OP [facility] only) 10/05/22 1404 Left lateral;lower leg skin tear (Active)   Thickness/Stage partial thickness 10/05/22 1400   Base pink 10/05/22 1400   Periwound intact 10/05/22 1400   Periwound Temperature warm 10/05/22 1400   Periwound Skin Turgor soft 10/05/22 1400   Edges open 10/05/22 1400   Length (cm) 2.2 10/05/22 1400   Width (cm) 2.3 10/05/22 1400   Depth (cm) 0.1 10/05/22 1400   Wound (cm^2) 5.06 cm^2 10/05/22 1400   Wound Volume (cm^3) 0.51 cm^3 10/05/22 1400   Drainage Characteristics/Odor serosanguineous 10/05/22 1400   Drainage Amount moderate 10/05/22 1400   Care, Wound non-select wound debridement performed 10/05/22 1400             MDM: 39 minutes were spent on the date of the visit reviewing previous chart notes, evaluating patient and developing the treatment plan, this excludes any time spent on procedures.       PATIENT INSTRUCTIONS      Further instructions from your care team       Ernie Leary      9/23/1924    A DME order for supplies has been placed to [facility]. If " there are any issues with your order including not receiving the order please call [facility] at [phone] option 3. They can also provide a tracking number for you if you had supplies shipped to you.    Wound Dressing Change: Left lateral lower leg  Cleanse with mild unscented soap and water  Apply Medline hydrogel to wound then cover with 1 4x4 mepilex border  Change three times a week    Your next appointment is a telephone visit. Please text photos of your wounds the day before or the day of the appointment to [phone]. Please include a tape measure or ruler of some sort in your photo for reference.       Adriana Salmon PA-C October 5, 2022    Call us at [phone] if you have any questions about your wounds, have redness or swelling around your wound, have a fever of 101 or greater or if you have any other problems or concerns. We answer the phone Monday through Friday 8 am to 4 pm, please leave a message as we check the voicemail frequently throughout the day.     If you had a positive experience please indicate that on your patient satisfaction survey form that [facility] will be sending you.    It was a pleasure meeting with you today.  Thank you for allowing me and my team the privilege of caring for you today.  YOU are the reason we are here, and I truly hope we provided you with the excellent service you deserve.  Please let us know if there is anything else we can do for you so that we can be sure you are leaving completely satisfied with your care experience.      If you have any billing related questions please call the [facility] office at [phone]. The clinic staff does not handle billing related matters.    If you are scheduled to have a follow up appointment, you will receive a reminder call the day before your visit. On the appointment day please arrive 15 minutes prior to your appointment time. If you are unable to keep that appointment, please call the  clinic to cancel or reschedule. If you are more than 10 minutes late or greater for your appointment, the clinic policy is that you may be asked to reschedule.'      Durable Medical Equipment Wound Care Orders     Wound Care Order for DME - ONLY FOR DME   As directed      DME Provider: radha Hong    Wound Supply Order Options: Complex Wound    Optional: .dmewound can be used to pull in order specific information into documentation    Wound Number: Wound 1    Wound 1 Location: Left lateral lower leg    Wound 1 Dressing Change Frequency: Other (Comments) Comment - Three times a week    Wound 1 Length of Need: 30 days    Wound 1 - Dressing Supplies: Primary    Wound 1 - Primary Dressing Dispensing Instructions: Ok to Substitute    Wound 1 - Primary Dressing Types: Foam Dressing w/ Border    Wound 1 - Foam with Border Types: Mepilex Border    Wound 1 - Allevyn Border Size: Other (Comments) Comment - 4x4    Wound 1 - Allevyn Border Quantity: 12          Electronically signed by Adriana Salmon PA-C on October 5, 2022

## 2022-10-25 NOTE — TELEPHONE ENCOUNTER
Routing refill request to provider for review/approval because:  BP Readings from Last 3 Encounters:   10/05/22 (!) 156/80   09/13/22 130/72   07/30/22 (!) 178/101         Glenn Escamilla RN  [facility] Triage Nurse

## 2022-10-26 NOTE — DISCHARGE INSTRUCTIONS
[facility]  [address] [facility] [address], [place] [address]  Appointment Phone [phone]     Ernie Leary      9/23/1924    Your wound is healed!! Dressings are no longer required.     Wound Clinic follow up in the future if there are any wound concerns     Adriana Salmon PA-C October 26, 2022    Call us at [phone] if you have any questions about your wounds, have redness or swelling around your wound, have a fever of 101 or greater or if you have any other problems or concerns. We answer the phone Monday through Friday 8 am to 4 pm, please leave a message as we check the voicemail frequently throughout the day.

## 2022-10-26 NOTE — PROGRESS NOTES
"[facility]    ASSESSMENT:   1. Resolved left leg wound    PLAN/DISCUSSION:   1. Discontinue dressings  2. aquaphor or other emollient daily     HISTORY OF PRESENT ILLNESS:   Ernie Leary is a 89 year old male who returns today for a traumatic left lower leg wound. He struck his leg at the end of Sepetember and developed a \"blood blister.\" This had subsequently drained. He has type 2 DM but no known peripheral vascular hx.  I saw him on 10/5 and advised hydrogel and silicone foam dressing. They return today via telemed and report the wound has closed.     VITALS: There were no vitals taken for this visit.     PHYSICAL EXAM:  INTEGUMENTARY:   Wound (used by OP [facility] only) 10/05/22 1404 Left lateral;lower leg skin tear (Active)   Thickness/Stage partial thickness 10/26/22 0727   Base pink 10/26/22 0727   Periwound intact 10/26/22 0727   Periwound Temperature warm 10/26/22 0727   Periwound Skin Turgor soft 10/26/22 0727   Edges open 10/26/22 0727   Length (cm) 2.2 10/26/22 0727   Width (cm) 2.3 10/26/22 0727   Depth (cm) 0.1 10/26/22 0727   Wound (cm^2) 5.06 cm^2 10/26/22 0727   Wound Volume (cm^3) 0.51 cm^3 10/26/22 0727   Wound healing % 0 10/26/22 0727   Drainage Characteristics/Odor serosanguineous 10/26/22 0727   Drainage Amount moderate 10/26/22 0727   Care, Wound non-select wound debridement performed 10/05/22 1400         Phone-Visit Details    Type of service:  Phone Visit    Length of call: 15 minutes    Originating Location (pt. Location): Home    Distant Location (provider location):  [facility]     Mode of Communication:  Telephone    Physician has received verbal consent for a Telephone Visit from the patient? Yes          Further instructions from your care team           [facility]  [address] [place] [address], [facility] [address]  Appointment Phone [phone]     Ernie Leary      9/23/1924    Your wound is healed!! " Dressings are no longer required.     Wound Clinic follow up in the future if there are any wound concerns     Adriana Salmon PA-C October 26, 2022    Call us at [phone] if you have any questions about your wounds, have redness or swelling around your wound, have a fever of 101 or greater or if you have any other problems or concerns. We answer the phone Monday through Friday 8 am to 4 pm, please leave a message as we check the voicemail frequently throughout the day.

## 2022-11-08 NOTE — TELEPHONE ENCOUNTER
Photo received on clinic cell phone for concern of new leg wound. Photo reviewed with Delores GILES who did not feel the patient needed to be seen but the wound should be monitored and something to protect the lower legs to prevent new wounds/bruises. Call returned to Alexsandra and discussed above. She verbalized understanding and will look into leg protectors or socks.

## 2022-11-29 NOTE — TELEPHONE ENCOUNTER
Received photo from clinic phone of wound on lower leg. Reviewed with Delores Salmon PA-C who stated to continue what they are doing and to wash it really well. Writer called patients wife, Alexsandra, and she states that it is healed over now due to switching to non-adherent bandages. Writer stated that if there are any reoccurring wounds to reach out.

## 2022-12-31 NOTE — H&P
[facility]    History and Physical - Hospitalist Service       Date of Admission:  12/31/2022    Assessment & Plan      Ernie Leary is a 89 year old male admitted on 12/31/2022 with generalized weakness      Generalized weakness  Altered mental status with history of dementia  Rule out UTI and viral infection  P/w generalized weakness from home with a fall  Workup largely unremarkable, with normal labs, normal head ct, and CT CAP without obvious etiology  Wife apparently has symptoms of viral gastroenteritis  Wide differential diagnosis possible in a patient nearing 89 years old with dementia including infection (URI, UTI), stroke (grossly nonfocal, head CT negative)  Plan  - register to observation  - follow-up on the UA and viral panel  - watch for gastroenteritis symptoms  - fall precautions  - PT consult  - obtain PCT  - neuro checks q8 hours if he is able to comply. If high suspicion of stroke develops, would consider MRI, not clear if he would tolerate. Note h/o likely cerebral amyloid    Hypertension  Plan  - prn hydralazine    Widespread foraminal stenosis  Plan  - PT consult    Pancreatic cysts  Mesenteric calcification  Seen incidentally on CT  Plan  - Follow-up PCP to determine if further work-up is desired.  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 Alcantar note 11/21/2022)  Plan  - resume medications once verified  - there is report he was weaned off of keppra  - note patient is high risk for hemorrhage with antiplatelet or anticoagulant with his amyloid angiopathy       Diet: NPO for Medical/Clinical Reasons Except for: Meds, Ice Chips    DVT Prophylaxis: Pneumatic Compression Devices  Mclaughlin Catheter: Not present  Central Lines: None  Cardiac Monitoring: None  Code Status:   DNR/DNI discussed with wife Alexsandra.    Clinically Significant Risk  Factors Present on Admission                  # Hypertension: home medication list includes antihypertensive(s)              Disposition Plan         The patient's care was discussed with the Patient and Patient's Family.    Josesito Bains,   Hospitalist Service  [facility]  Securely message with the Vocera Web Console (learn more here)  Text page via [employer] Paging/Directory         ______________________________________________________________________    Chief Complaint   Generalized weakness    History is obtained from the patient    History of Present Illness   Ernie Leary is a 89 year old male with pmh of ICH with likely cerebral amyloid angiopathy, hypertension, anxiety, depression, and dementia who presents with generalized weakness and fall last night.  He lives at home with his wife who is 97 years old and she states it was actually more of generalized weakness rather than a fall last night.  He had a hard time getting off of the floor.  His wife Alexsandra thinks that it was more of a muscle strain from falling.  EMS was called and got him back to bed however they advised him to come to the emergency department to get checked out.  However they decided to stay at home.  This morning Ernie was unable to get out of bed and ended up urinating his bed twice.  Wife was not able to get him out of bed because of her advanced age and weakness.  Alexsandra did states she did evaluate him for stroke which was negative.  So EMS was called again and brought him into the emergency department    In the emergency department Ernie was found to be disoriented however moving all extremities.  He was found to be very hypertensive.  He is mildly confused.  He states he has pain in the anterior left side of his neck.    Review of Systems    The 10 point Review of Systems is negative other than noted in the HPI or here.     Past Medical History    I have reviewed this patient's medical history and  updated it with pertinent information if needed.   Past Medical History:   Diagnosis Date     Abnormal glucose      Atrial flutter (H)     ablation 2008     Diabetes mellitus (H)     not currently being treated, diet controlled at this time     Essential hypertension, benign      Hemorrhagic stroke (H) 2012     Hyperlipidemia LDL goal <130 02/07/2011     Other and unspecified hyperlipidemia      Sebaceous cyst      Seizure disorder (H) 07/21/2017     Skin cancer     Dr. Tucker     Stroke (H)      Syncope and collapse 11/15/2001       Past Surgical History   I have reviewed this patient's surgical history and updated it with pertinent information if needed.  Past Surgical History:   Procedure Laterality Date     ------------OTHER-------------  01/01/2008    Cardiac ablation     ADJACENT TISSUE TRANSFER, FOREHEAD/CHEEKS/CHIN/MOUTH/NECK/AXILLAE/GENITALIA/HANDS/FEET, < OR = 10CM       ARTHROTOMY SHOULDER, ROTATOR CUFF REPAIR, COMBINED  11/18/2011    Procedure:COMBINED ARTHROTOMY SHOULDER, ROTATOR CUFF REPAIR; LEFT SHOULDER OPEN ROTATOR CUFF REPAIR, BURSECTOMY, DISTAL CLAVICLE RESECTION; Surgeon:KHALIDA ZABALA; Location: OR     BIOPSY      multiple skin biopsies for skin cancers     CARDIAC SURGERY       COSMETIC SURGERY  2008    Removal of squamous carcinoma behind ear     CYSTOSCOPY, TRANSURETHRAL RESECTION (TUR) PROSTATE, COMBINED  08/15/2012    Procedure: COMBINED CYSTOSCOPY, TRANSURETHRAL RESECTION (TUR) PROSTATE;  CYSTOSCOPY, TRANSURETHRAL RESECTION  OF  PROSTATE WITH PLASMA LOOP;  Surgeon: Harmeet Kilgore MD;  Location:  OR     EYE SURGERY  2018    2 Cataracts     HERNIA REPAIR  2014     HERNIORRHAPHY INGUINAL Right 05/29/2015    Procedure: HERNIORRHAPHY INGUINAL;  Surgeon: Scottie Nunn MD;  Location: [facility]     ORTHOPEDIC SURGERY  2011    Shoulder tendon reattached     PHACOEMULSIFICATION CLEAR CORNEA WITH TORIC INTRAOCULAR LENS IMPLANT Right 07/24/2017    Procedure: PHACOEMULSIFICATION CLEAR CORNEA WITH  TORIC INTRAOCULAR LENS IMPLANT;  RIGHT PHACOEMULSIFICATION CLEAR CORNEA WITH TORIC INTRAOCULAR LENS IMPLANT ;  Surgeon: Yoel Calero MD;  Location: [facility]     PHACOEMULSIFICATION CLEAR CORNEA WITH TORIC INTRAOCULAR LENS IMPLANT Left 2017    Procedure: PHACOEMULSIFICATION CLEAR CORNEA WITH TORIC INTRAOCULAR LENS IMPLANT;  LEFT PHACOEMULSIFICATION CLEAR CORNEA WITH TORIC INTRAOCULAR LENS IMPLANT ;  Surgeon: Yoel Calero MD;  Location: [facility]       Social History   I have reviewed this patient's social history and updated it with pertinent information if needed.  Social History     Tobacco Use     Smoking status: Former     Packs/day: 2.00     Years: 30.00     Pack years: 60.00     Types: Cigarettes     Start date: 1946     Quit date: 1981     Years since quittin.0     Smokeless tobacco: Never   Substance Use Topics     Alcohol use: Not Currently     Drug use: No       Family History   I have reviewed this patient's family history and updated it with pertinent information if needed.  Family History   Problem Relation Age of Onset     Coronary Artery Disease Father          of heart attack     Anxiety Disorder Sister        Prior to Admission Medications   Prior to Admission Medications   Prescriptions Last Dose Informant Patient Reported? Taking?   COENZYME Q-10 PO  Spouse/Significant Other Yes No   Sig: Take 100 mg by mouth every 14 days    CYANOCOBALAMIN PO  Spouse/Significant Other Yes No   Sig: Take 1,000 mcg by mouth daily.   Cholecalciferol (VITAMIN D) 2000 UNITS tablet  Spouse/Significant Other No No   Sig: Take 2,000 Units by mouth daily   Patient taking differently: Take 2,000 Units by mouth every other day   amLODIPine (NORVASC) 2.5 MG tablet   No No   Sig: TAKE 1 TABLET BY MOUTH TWICE A DAY   calcium 600 MG tablet  Spouse/Significant Other Yes No   Sig: Take 1 tablet by mouth every other day    calcium carbonate (TUMS) 500 MG chewable tablet  Spouse/Significant Other Yes No    Sig: Take 1 chew tab by mouth daily as needed.   calcium carbonate 500 mg, elemental, 1250 (500 Ca) MG tablet chewable   Yes No   escitalopram (LEXAPRO) 5 MG tablet   Yes No   Sig: Take 5 mg by mouth daily   fosinopril (MONOPRIL) 10 MG tablet   No No   Sig: TAKE 1 TABLET BY MOUTH TWICE A DAY   memantine (NAMENDA) 10 MG tablet   Yes No   Sig: Take 10 mg by mouth   metoprolol tartrate (LOPRESSOR) 50 MG tablet   No No   Sig: TAKE 1/2 TABLET BY MOUTH TWICE A DAY   prune juice LIQD  Spouse/Significant Other Yes No   Sig: Take  by mouth daily (with breakfast).   psyllium (METAMUCIL) 58.6 % POWD  Spouse/Significant Other Yes No   Sig: Take 1 teaspoonful by mouth 3 times daily as needed. Constipation.   vitamin B complex with vitamin C (STRESS TAB) tablet  Spouse/Significant Other Yes No   Sig: Take 1 tablet by mouth daily      Facility-Administered Medications: None     Allergies   Allergies   Allergen Reactions     Adrenalin [Epinephrine Hcl]      Hctz      hyponatremia       Physical Exam   Vital Signs: Temp: 98  F (36.7  C) Temp src: Temporal BP: (!) 206/124 Pulse: 84   Resp: 20 SpO2: 99 % O2 Device: None (Room air)    Weight: 0 lbs 0 oz    GENERAL: Sleeping but arouses to voice.  HEAD: atraumatic  EYES: pupils reactive, extraocular muscles intact, conjunctivae normal  ENT:  mucus membranes moist  NECK:  trachea midline, normal range of motion  RESPIRATORY: no tachypnea, breath sounds clear to auscultation   CVS: normal S1/S2, no murmurs, intact distal pulses  ABDOMEN: soft, nontender, nondistention  MUSCULOSKELETAL:  He points to pain in the anterior left side of his neck near the carotid.  He grimaces and moans slightly when touched.  No focal tenderness of his spine.  SKIN: warm and dry, no acute rashes or ulceration  NEURO: Moving bilateral upper and lower extremities equally.  Largely noncompliant with a neuro exam.  Face is symmetric.  Alert and oriented to person only.  Not to place or date  PSYCH:  Mood/affect  normal    Data   Data reviewed today: I reviewed all medications, new labs and imaging results over the last 24 hours. I personally reviewed   CT chest abdomen pelvis: No acute abnormality.  Likely old L2 body compression fractures.  Multiple pancreatic head and uncinate process cysts measuring up to 9 mm.  Mesenteric calcification  CT head: No acute abnormality   CT cervical spine: Widespread foraminal stenoses at multiple levels as outlined on the CT scan    Recent Labs   Lab 12/31/22  1112   WBC 9.4   HGB 13.0*   MCV 91         POTASSIUM 3.7   CHLORIDE 100   CO2 27   BUN 15   CR 0.88   ANIONGAP 10   SOCORRO 9.2   *   ALBUMIN 3.8   PROTTOTAL 7.2   BILITOTAL 1.2   ALKPHOS 63   ALT 17   AST 22

## 2022-12-31 NOTE — PROGRESS NOTES
"Wife Alexsandra states pt had a severe stroke 10 years ago, but has little remaining deficits. Wife states pt does most of ADLs himself, but she often helps. Baseline \"memory problems.\"  Baseline walker and continent of urine.   "

## 2022-12-31 NOTE — ED PROVIDER NOTES
History     Chief Complaint:  Generalized Weakness       The history is provided by a relative.      Ernie Leary is a 89 year old male who presents with family for evaluation of generalized weakness. His step-daughter reports that last night he was weak and unable to get into his bed, leading to a fall. She notes the patient is usually able to get to his knees and stand, but he was unable to last night and his wife could not get him up, so they called 911 for lift assist after a couple hours. EMS suggested coning in, but his wife thought he would be fine once he was in his bed. Today the patient has been unable to sit up by himself and seems very confused per his step-daughter. She reports baseline dementia, but says confusion today is much more severe. She also notes incontinence which is abnormal for the patient. She say he has reported numbness in his left shoulder and back, as well as back pain. The patient has history of stroke.     Independent Historian: no, history provided by family   Review of External Notes: n/a     ROS:  Review of Systems   Unable to perform ROS: Dementia   Genitourinary: Positive for enuresis.   Musculoskeletal: Positive for back pain.   Neurological: Positive for weakness and numbness.   Psychiatric/Behavioral: Positive for confusion.     Allergies:  Adrenalin [Epinephrine Hcl]  Hctz     Medications:    Amlodipine  Coenzyme Q10  Cyanocobalamin  Lexapro  Fosinopril  Namenda  Lopressor    Past Medical History:    Atrial flutter  Diabetes mellitus  Hypertension  Hyperlipidemia  Hemorrhagic stroke  Sebaceous cyst  Seizure disorder  Skin cancer  Stroke  Syncope and collapse    Past Surgical History:    Cardiac ablation  Adjacent tissue transfer  Arthrotomy shoulder  Cardiac surgery  Skin cancer removal  Cystoscopy, TUR prostate  Cataract surgery  Phacoemulsification clear cornea w standard intraocular lens implant x2  Shoulder tendon reattached  Hernia repair     Family History:     Anxiety disorder  Coronary artery disease    Social History:  The patient presents with his step-daughter and her    He lives with his wife, who currently has a cold   reports that he quit smoking about 42 years ago. His smoking use included cigarettes. He started smoking about 76 years ago. He has a 60.00 pack-year smoking history. He has never used smokeless tobacco. He reports that he does not currently use alcohol. He reports that he does not use drugs.  PCP: Forest Ashraf     Physical Exam     Patient Vitals for the past 24 hrs:   BP Temp Temp src Pulse Resp SpO2   12/31/22 1530 (!) 156/99 -- -- 54 -- 98 %   12/31/22 1500 (!) 171/106 -- -- 76 -- 96 %   12/31/22 1430 (!) 158/99 -- -- 81 -- --   12/31/22 1400 (!) 206/124 -- -- 84 -- 99 %   12/31/22 1318 -- -- -- -- -- 98 %   12/31/22 1220 -- -- -- -- -- 96 %   12/31/22 1200 (!) 191/109 -- -- 78 -- 97 %   12/31/22 1142 (!) 189/112 -- -- 74 -- 96 %   12/31/22 1107 (!) 205/111 98  F (36.7  C) Temporal 77 20 96 %        Physical Exam  GENERAL: restless, looks uncomfortable  HEAD: atraumatic  EYES: pupils reactive, extraocular muscles intact, conjunctivae normal  ENT:  mucus membranes moist  NECK:  trachea midline, normal range of motion  RESPIRATORY: no tachypnea, breath sounds clear to auscultation   CVS: normal S1/S2, no murmurs, intact distal pulses  ABDOMEN: soft, nontender, nondistention  MUSCULOSKELETAL: generalized neck pain, seems uncomfortable with palpating the lumbar spine, some bruising to his arms but no focal tenderness, able to go through range of motion of upper extremities and hips  SKIN: warm and dry, no acute rashes or ulceration  NEURO: GCS 15, cranial nerves intact, alert and oriented x3  PSYCH:  mood/affect normal     Emergency Department Course     Imaging:  CT Head w/o Contrast   Final Result   IMPRESSION:   1.  No acute intracranial process.      CT Cervical Spine w/o Contrast   Final Result   IMPRESSION:   1.  No fracture or  posttraumatic subluxation.   2.  Spondylosis with foraminal stenoses as detailed.      CT Chest/Abdomen/Pelvis w Contrast   Final Result   IMPRESSION:   1.  No acute post traumatic findings within the chest, abdomen or pelvis.   2.  Severe age indeterminate but likely old L2 vertebral body compression fracture, assuming no focal lumbar spine pain.   3.  Multiple pancreatic head and uncinate process cysts with the largest measuring 9 mm. These can be followed per guidelines below.   4.  Likely incidental 1.4 cm lower mesenteric calcification. Recommend attention on follow-up.            REFERENCE:   Revisions of international consensus [place] guidelines for the management of IPMN of the pancreas. Pancreatology 2017;17(5):738-753.      Largest cyst less than 10 mm: CT or MRI/MRCP in 6 months and then every 2 years if no change.          Report per radiology    Laboratory:  Labs Ordered and Resulted from Time of ED Arrival to Time of ED Departure   COMPREHENSIVE METABOLIC PANEL - Abnormal       Result Value    Sodium 137      Potassium 3.7      Chloride 100      Carbon Dioxide (CO2) 27      Anion Gap 10      Urea Nitrogen 15      Creatinine 0.88      Calcium 9.2      Glucose 146 (*)     Alkaline Phosphatase 63      AST 22      ALT 17      Protein Total 7.2      Albumin 3.8      Bilirubin Total 1.2      GFR Estimate 78     CBC WITH PLATELETS AND DIFFERENTIAL - Abnormal    WBC Count 9.4      RBC Count 4.38 (*)     Hemoglobin 13.0 (*)     Hematocrit 40.0      MCV 91      MCH 29.7      MCHC 32.5      RDW 13.2      Platelet Count 250      % Neutrophils 75      % Lymphocytes 11      % Monocytes 13      % Eosinophils 0      % Basophils 1      % Immature Granulocytes 0      NRBCs per 100 WBC 0      Absolute Neutrophils 7.0      Absolute Lymphocytes 1.1      Absolute Monocytes 1.2      Absolute Eosinophils 0.0      Absolute Basophils 0.1      Absolute Immature Granulocytes 0.0      Absolute NRBCs 0.0     ROUTINE UA WITH  MICROSCOPIC REFLEX TO CULTURE - Abnormal    Color Urine Straw      Appearance Urine Clear      Glucose Urine Negative      Bilirubin Urine Negative      Ketones Urine Trace (*)     Specific Gravity Urine 1.009      Blood Urine Small (*)     pH Urine 7.5 (*)     Protein Albumin Urine 50 (*)     Urobilinogen Urine Normal      Nitrite Urine Negative      Leukocyte Esterase Urine Negative      RBC Urine <1      WBC Urine <1     INFLUENZA A/B & SARS-COV2 PCR MULTIPLEX - Normal    Influenza A PCR Negative      Influenza B PCR Negative      RSV PCR Negative      SARS CoV2 PCR Negative          Emergency Department Course & Assessments:     Interventions:  Medications   lidocaine 1 % 0.1-1 mL (has no administration in time range)   lidocaine (LMX4) cream (has no administration in time range)   sodium chloride (PF) 0.9% PF flush 3 mL (3 mLs Intracatheter Given 12/31/22 1525)   sodium chloride (PF) 0.9% PF flush 3 mL (has no administration in time range)   HYDROmorphone (PF) (DILAUDID) injection 0.5 mg (0.5 mg Intravenous Given 12/31/22 1142)   ondansetron (ZOFRAN) injection 4 mg (4 mg Intravenous Given 12/31/22 1141)   Saline (68 mLs As instructed Given 12/31/22 1400)   iopamidol (ISOVUE-370) solution 91 mL (91 mLs Intravenous Given 12/31/22 1400)   labetalol (NORMODYNE/TRANDATE) injection 20 mg (20 mg Intravenous Given 12/31/22 1428)        ED Course as of 12/31/22 1627   Sat Dec 31, 2022   1121 I obtained history and examined the patient as noted above.    1536 I rechecked the patient and explained findings.     Consultations/Discussion of Management or Tests:  1432 I consulted with Dr. Bains of the hospitalist team regarding the patient, who accepted the patient for admission.      Disposition:  The patient was admitted to the hospital under the care of Dr. Bains.     Impression & Plan      Medical Decision Making:    Patient presents with fall and weakness.  Lives at home with wife and fell last night and  could not get up requiring a lift assist from EMS with suggestion of coming into the hospital but wife thought he would get better by the morning.  He was incontinent of urine is now unable to get up.  He has some underlying dementia so difficult to examine him.  CTs are unrevealing for acute injury.  Laboratory work-up is negative, urine is normal and viral panel is negative.  Patient given pain control.  Spoke with the hospitalist regarding admission for observation.      Diagnosis:    ICD-10-CM    1. Fall, initial encounter  W19.XXXA       2. Generalized muscle weakness  M62.81          Scribe Disclosure:  I, Marva Francois, am serving as a scribe at 11:21 AM on 12/31/2022 to document services personally performed by Alex Bryant MD based on my observations and the provider's statements to me.     12/31/2022   Alex Bryant MD Adams, Shaun L, MD  12/31/22 7506

## 2022-12-31 NOTE — PROGRESS NOTES
RECEIVING UNIT ED HANDOFF REVIEW    ED Nurse Handoff Report was reviewed by: Marlee Pierre RN on December 31, 2022 at 4:41 PM

## 2022-12-31 NOTE — ED NOTES
[facility]  ED Nurse Handoff Report    ED Chief complaint: Generalized Weakness (Sx since yesterday,)      ED Diagnosis:   Final diagnoses:   Fall, initial encounter   Generalized muscle weakness       Code Status: Full Code    Allergies:   Allergies   Allergen Reactions     Adrenalin [Epinephrine Hcl]      Hctz      hyponatremia       Patient Story: Patient had a fall the night before. EMS arrived and helped the patient back to bed. In the morning wife noticed that the patient was more confused, incontinent and complaining of severe low back pain.   Focused Assessment:  Confusion, difficulty walking    Treatments and/or interventions provided: CT of head, UA   Patient's response to treatments and/or interventions: IV labatalol for high BP, zofran for nausea    To be done/followed up on inpatient unit:  Monitor congnition    Does this patient have any cognitive concerns?: Alzheimer's    Activity level - Baseline/Home:  Stand with Assist  Activity Level - Current:   Unknown    Patient's Preferred language: English   Needed?: No    Isolation: None  Infection: Not Applicable  Patient tested for COVID 19 prior to admission: YES  Bariatric?: No    Vital Signs:   Vitals:    12/31/22 1318 12/31/22 1400 12/31/22 1430 12/31/22 1500   BP:  (!) 206/124 (!) 158/99 (!) 171/106   Pulse:  84 81 76   Resp:       Temp:       TempSrc:       SpO2: 98% 99%  96%       Cardiac Rhythm:     Was the PSS-3 completed:   No -patient confused   What interventions are required if any?               Family Comments: family at  bedside  OBS brochure/video discussed/provided to patient/family: No             For the majority of the shift this patient's behavior was Green.   Behavioral interventions performed were none .    ED NURSE PHONE NUMBER: [phone]

## 2022-12-31 NOTE — PHARMACY-ADMISSION MEDICATION HISTORY
Pharmacy Medication History  Admission medication history interview status for the 12/31/2022  admission is complete. See EPIC admission navigator for prior to admission medications     Location of Interview: Patient room  Medication history sources: Patient's family/friend (Wife/Daughter) and Surescripts    Significant changes made to the medication list:  Removed Prune juice, Namenda    Adjusted: Fosinopril to daily (recently reduced from bid due to low bp)    Confirmed doses on B12, Calcium w/Vit K, Coenzyme Q    In the past week, patient estimated taking medication this percent of the time: greater than 90%    Additional medication history information:   none    Medication reconciliation completed by provider prior to medication history? No    Time spent in this activity: 20 min    Prior to Admission medications    Medication Sig Last Dose Taking? Auth Provider Long Term End Date   amLODIPine (NORVASC) 2.5 MG tablet TAKE 1 TABLET BY MOUTH TWICE A DAY 12/30/2022 Yes Forest Ashraf MD Yes    co-enzyme Q-10 100 MG CAPS capsule Take 100 mg by mouth daily 12/30/2022 Yes Unknown, Entered By History     Cyanocobalamin 2500 MCG CHEW Take 2,500 mcg by mouth daily 12/30/2022 Yes Unknown, Entered By History     escitalopram (LEXAPRO) 5 MG tablet Take 5 mg by mouth daily 12/30/2022 Yes Reported, Patient Yes    fosinopril (MONOPRIL) 10 MG tablet TAKE 1 TABLET BY MOUTH TWICE A DAY  Patient taking differently: Take 10 mg by mouth daily 12/30/2022 Yes Forest Ashraf MD Yes    metoprolol tartrate (LOPRESSOR) 50 MG tablet TAKE 1/2 TABLET BY MOUTH TWICE A DAY 12/30/2022 Yes Forest Ashraf MD Yes    psyllium (METAMUCIL) 58.6 % POWD Take 1 teaspoonful by mouth 3 times daily as needed. Constipation. Unknown at prn Yes Unknown, Entered By History     vitamin B complex with vitamin C (STRESS TAB) tablet Take 1 tablet by mouth daily 12/30/2022 Yes Unknown, Entered By History     VITAMIN D-VITAMIN K PO Take 1 tablet by mouth daily  125 units/100 mg OTC 12/30/2022 Yes Unknown, Entered By History         The information provided in this note is only as accurate as the sources available at the time of update(s)

## 2023-01-01 ENCOUNTER — APPOINTMENT (OUTPATIENT)
Dept: PHYSICAL THERAPY | Facility: CLINIC | Age: 88
End: 2023-01-01
Attending: STUDENT IN AN ORGANIZED HEALTH CARE EDUCATION/TRAINING PROGRAM
Payer: COMMERCIAL

## 2023-01-01 ENCOUNTER — PATIENT OUTREACH (OUTPATIENT)
Dept: CARE COORDINATION | Facility: CLINIC | Age: 88
End: 2023-01-01

## 2023-01-01 VITALS
SYSTOLIC BLOOD PRESSURE: 152 MMHG | DIASTOLIC BLOOD PRESSURE: 93 MMHG | TEMPERATURE: 99.7 F | RESPIRATION RATE: 20 BRPM | HEART RATE: 90 BPM | OXYGEN SATURATION: 93 %

## 2023-01-01 LAB
ANION GAP SERPL CALCULATED.3IONS-SCNC: 7 MMOL/L (ref 3–14)
BASOPHILS # BLD AUTO: 0.1 10E3/UL (ref 0–0.2)
BASOPHILS NFR BLD AUTO: 1 %
BUN SERPL-MCNC: 20 MG/DL (ref 7–30)
CALCIUM SERPL-MCNC: 9.3 MG/DL (ref 8.5–10.1)
CHLORIDE BLD-SCNC: 99 MMOL/L (ref 94–109)
CK SERPL-CCNC: 357 U/L (ref 30–300)
CO2 SERPL-SCNC: 29 MMOL/L (ref 20–32)
CREAT SERPL-MCNC: 1.01 MG/DL (ref 0.66–1.25)
EOSINOPHIL # BLD AUTO: 0.1 10E3/UL (ref 0–0.7)
EOSINOPHIL NFR BLD AUTO: 1 %
ERYTHROCYTE [DISTWIDTH] IN BLOOD BY AUTOMATED COUNT: 13.7 % (ref 10–15)
GFR SERPL CREATININE-BSD FRML MDRD: 67 ML/MIN/1.73M2
GLUCOSE BLD-MCNC: 134 MG/DL (ref 70–99)
GLUCOSE BLDC GLUCOMTR-MCNC: 126 MG/DL (ref 70–99)
GLUCOSE BLDC GLUCOMTR-MCNC: 136 MG/DL (ref 70–99)
GLUCOSE BLDC GLUCOMTR-MCNC: 137 MG/DL (ref 70–99)
GLUCOSE BLDC GLUCOMTR-MCNC: 139 MG/DL (ref 70–99)
GLUCOSE BLDC GLUCOMTR-MCNC: 139 MG/DL (ref 70–99)
GLUCOSE BLDC GLUCOMTR-MCNC: 140 MG/DL (ref 70–99)
GLUCOSE BLDC GLUCOMTR-MCNC: 141 MG/DL (ref 70–99)
GLUCOSE BLDC GLUCOMTR-MCNC: 143 MG/DL (ref 70–99)
GLUCOSE BLDC GLUCOMTR-MCNC: 143 MG/DL (ref 70–99)
GLUCOSE BLDC GLUCOMTR-MCNC: 144 MG/DL (ref 70–99)
GLUCOSE BLDC GLUCOMTR-MCNC: 144 MG/DL (ref 70–99)
GLUCOSE BLDC GLUCOMTR-MCNC: 148 MG/DL (ref 70–99)
GLUCOSE BLDC GLUCOMTR-MCNC: 151 MG/DL (ref 70–99)
GLUCOSE BLDC GLUCOMTR-MCNC: 152 MG/DL (ref 70–99)
GLUCOSE BLDC GLUCOMTR-MCNC: 159 MG/DL (ref 70–99)
GLUCOSE BLDC GLUCOMTR-MCNC: 159 MG/DL (ref 70–99)
GLUCOSE BLDC GLUCOMTR-MCNC: 166 MG/DL (ref 70–99)
GLUCOSE BLDC GLUCOMTR-MCNC: 168 MG/DL (ref 70–99)
GLUCOSE BLDC GLUCOMTR-MCNC: 169 MG/DL (ref 70–99)
GLUCOSE BLDC GLUCOMTR-MCNC: 173 MG/DL (ref 70–99)
GLUCOSE BLDC GLUCOMTR-MCNC: 174 MG/DL (ref 70–99)
GLUCOSE BLDC GLUCOMTR-MCNC: 176 MG/DL (ref 70–99)
GLUCOSE BLDC GLUCOMTR-MCNC: 189 MG/DL (ref 70–99)
GLUCOSE BLDC GLUCOMTR-MCNC: 190 MG/DL (ref 70–99)
GLUCOSE BLDC GLUCOMTR-MCNC: 193 MG/DL (ref 70–99)
GLUCOSE BLDC GLUCOMTR-MCNC: 196 MG/DL (ref 70–99)
GLUCOSE BLDC GLUCOMTR-MCNC: 201 MG/DL (ref 70–99)
GLUCOSE BLDC GLUCOMTR-MCNC: 234 MG/DL (ref 70–99)
GLUCOSE BLDC GLUCOMTR-MCNC: 259 MG/DL (ref 70–99)
HCT VFR BLD AUTO: 37.9 % (ref 40–53)
HGB BLD-MCNC: 12.3 G/DL (ref 13.3–17.7)
IMM GRANULOCYTES # BLD: 0 10E3/UL
IMM GRANULOCYTES NFR BLD: 0 %
LACTATE SERPL-SCNC: 1.2 MMOL/L (ref 0.7–2)
LYMPHOCYTES # BLD AUTO: 1.4 10E3/UL (ref 0.8–5.3)
LYMPHOCYTES NFR BLD AUTO: 18 %
MCH RBC QN AUTO: 29.1 PG (ref 26.5–33)
MCHC RBC AUTO-ENTMCNC: 32.5 G/DL (ref 31.5–36.5)
MCV RBC AUTO: 90 FL (ref 78–100)
MONOCYTES # BLD AUTO: 1.2 10E3/UL (ref 0–1.3)
MONOCYTES NFR BLD AUTO: 15 %
NEUTROPHILS # BLD AUTO: 5.1 10E3/UL (ref 1.6–8.3)
NEUTROPHILS NFR BLD AUTO: 65 %
NRBC # BLD AUTO: 0 10E3/UL
NRBC BLD AUTO-RTO: 0 /100
PLATELET # BLD AUTO: 230 10E3/UL (ref 150–450)
POTASSIUM BLD-SCNC: 3.7 MMOL/L (ref 3.4–5.3)
RBC # BLD AUTO: 4.22 10E6/UL (ref 4.4–5.9)
SODIUM SERPL-SCNC: 135 MMOL/L (ref 133–144)
WBC # BLD AUTO: 7.8 10E3/UL (ref 4–11)

## 2023-01-01 PROCEDURE — G0378 HOSPITAL OBSERVATION PER HR: HCPCS

## 2023-01-01 PROCEDURE — 82962 GLUCOSE BLOOD TEST: CPT

## 2023-01-01 PROCEDURE — 99207 PR APP CREDIT; MD BILLING SHARED VISIT: CPT | Performed by: STUDENT IN AN ORGANIZED HEALTH CARE EDUCATION/TRAINING PROGRAM

## 2023-01-01 PROCEDURE — 36415 COLL VENOUS BLD VENIPUNCTURE: CPT | Performed by: INTERNAL MEDICINE

## 2023-01-01 PROCEDURE — 96372 THER/PROPH/DIAG INJ SC/IM: CPT

## 2023-01-01 PROCEDURE — 250N000013 HC RX MED GY IP 250 OP 250 PS 637: Performed by: HOSPITALIST

## 2023-01-01 PROCEDURE — 99238 HOSP IP/OBS DSCHRG MGMT 30/<: CPT | Mod: GW | Performed by: INTERNAL MEDICINE

## 2023-01-01 PROCEDURE — 99232 SBSQ HOSP IP/OBS MODERATE 35: CPT | Performed by: INTERNAL MEDICINE

## 2023-01-01 PROCEDURE — 96372 THER/PROPH/DIAG INJ SC/IM: CPT | Performed by: HOSPITALIST

## 2023-01-01 PROCEDURE — 36415 COLL VENOUS BLD VENIPUNCTURE: CPT | Performed by: HOSPITALIST

## 2023-01-01 PROCEDURE — 250N000013 HC RX MED GY IP 250 OP 250 PS 637: Performed by: INTERNAL MEDICINE

## 2023-01-01 PROCEDURE — 250N000012 HC RX MED GY IP 250 OP 636 PS 637: Performed by: HOSPITALIST

## 2023-01-01 PROCEDURE — 97162 PT EVAL MOD COMPLEX 30 MIN: CPT | Mod: GP

## 2023-01-01 PROCEDURE — 85025 COMPLETE CBC W/AUTO DIFF WBC: CPT | Performed by: HOSPITALIST

## 2023-01-01 PROCEDURE — 99232 SBSQ HOSP IP/OBS MODERATE 35: CPT | Performed by: NURSE PRACTITIONER

## 2023-01-01 PROCEDURE — 82962 GLUCOSE BLOOD TEST: CPT | Mod: 91

## 2023-01-01 PROCEDURE — 97116 GAIT TRAINING THERAPY: CPT | Mod: GP

## 2023-01-01 PROCEDURE — 250N000011 HC RX IP 250 OP 636: Performed by: HOSPITALIST

## 2023-01-01 PROCEDURE — 97530 THERAPEUTIC ACTIVITIES: CPT | Mod: GP

## 2023-01-01 PROCEDURE — 80048 BASIC METABOLIC PNL TOTAL CA: CPT | Performed by: HOSPITALIST

## 2023-01-01 PROCEDURE — 82550 ASSAY OF CK (CPK): CPT | Performed by: INTERNAL MEDICINE

## 2023-01-01 PROCEDURE — 83605 ASSAY OF LACTIC ACID: CPT | Performed by: INTERNAL MEDICINE

## 2023-01-01 RX ORDER — QUETIAPINE FUMARATE 25 MG/1
25 TABLET, FILM COATED ORAL EVERY 12 HOURS PRN
DISCHARGE
Start: 2023-01-01

## 2023-01-01 RX ORDER — QUETIAPINE FUMARATE 25 MG/1
25 TABLET, FILM COATED ORAL AT BEDTIME
DISCHARGE
Start: 2023-01-01

## 2023-01-01 RX ORDER — ACETAMINOPHEN 325 MG/1
650 TABLET ORAL EVERY 6 HOURS PRN
DISCHARGE
Start: 2023-01-01

## 2023-01-01 RX ORDER — QUETIAPINE FUMARATE 25 MG/1
25 TABLET, FILM COATED ORAL EVERY 12 HOURS PRN
Status: DISCONTINUED | OUTPATIENT
Start: 2023-01-01 | End: 2023-01-01 | Stop reason: HOSPADM

## 2023-01-01 RX ORDER — AMLODIPINE BESYLATE 5 MG/1
5 TABLET ORAL 2 TIMES DAILY
Status: DISCONTINUED | OUTPATIENT
Start: 2023-01-01 | End: 2023-01-01

## 2023-01-01 RX ORDER — AMOXICILLIN 250 MG
2 CAPSULE ORAL 2 TIMES DAILY
Status: DISCONTINUED | OUTPATIENT
Start: 2023-01-01 | End: 2023-01-01 | Stop reason: HOSPADM

## 2023-01-01 RX ORDER — QUETIAPINE FUMARATE 25 MG/1
25 TABLET, FILM COATED ORAL AT BEDTIME
Status: DISCONTINUED | OUTPATIENT
Start: 2023-01-01 | End: 2023-01-01 | Stop reason: HOSPADM

## 2023-01-01 RX ORDER — POLYETHYLENE GLYCOL 3350 17 G/17G
17 POWDER, FOR SOLUTION ORAL 2 TIMES DAILY PRN
Status: DISCONTINUED | OUTPATIENT
Start: 2023-01-01 | End: 2023-01-01 | Stop reason: HOSPADM

## 2023-01-01 RX ORDER — AMLODIPINE BESYLATE 10 MG/1
10 TABLET ORAL DAILY
Status: DISCONTINUED | OUTPATIENT
Start: 2023-01-01 | End: 2023-01-01 | Stop reason: HOSPADM

## 2023-01-01 RX ORDER — AMLODIPINE BESYLATE 10 MG/1
10 TABLET ORAL DAILY
DISCHARGE
Start: 2023-01-01

## 2023-01-01 RX ORDER — AMOXICILLIN 250 MG
2 CAPSULE ORAL 2 TIMES DAILY
DISCHARGE
Start: 2023-01-01

## 2023-01-01 RX ORDER — POLYETHYLENE GLYCOL 3350 17 G/17G
17 POWDER, FOR SOLUTION ORAL 2 TIMES DAILY PRN
DISCHARGE
Start: 2023-01-01

## 2023-01-01 RX ADMIN — QUETIAPINE FUMARATE 25 MG: 25 TABLET ORAL at 21:44

## 2023-01-01 RX ADMIN — METOPROLOL TARTRATE 25 MG: 25 TABLET, FILM COATED ORAL at 21:25

## 2023-01-01 RX ADMIN — QUETIAPINE 12.5 MG: 25 TABLET, FILM COATED ORAL at 22:11

## 2023-01-01 RX ADMIN — ESCITALOPRAM 5 MG: 5 TABLET, FILM COATED ORAL at 09:02

## 2023-01-01 RX ADMIN — LISINOPRIL 2.5 MG: 2.5 TABLET ORAL at 08:15

## 2023-01-01 RX ADMIN — SENNOSIDES AND DOCUSATE SODIUM 2 TABLET: 50; 8.6 TABLET ORAL at 10:49

## 2023-01-01 RX ADMIN — METOPROLOL TARTRATE 25 MG: 25 TABLET, FILM COATED ORAL at 20:56

## 2023-01-01 RX ADMIN — QUETIAPINE FUMARATE 25 MG: 25 TABLET ORAL at 02:57

## 2023-01-01 RX ADMIN — METOPROLOL TARTRATE 25 MG: 25 TABLET, FILM COATED ORAL at 21:44

## 2023-01-01 RX ADMIN — INSULIN ASPART 2 UNITS: 100 INJECTION, SOLUTION INTRAVENOUS; SUBCUTANEOUS at 10:03

## 2023-01-01 RX ADMIN — AMLODIPINE BESYLATE 2.5 MG: 2.5 TABLET ORAL at 07:59

## 2023-01-01 RX ADMIN — AMLODIPINE BESYLATE 10 MG: 10 TABLET ORAL at 08:15

## 2023-01-01 RX ADMIN — AMLODIPINE BESYLATE 10 MG: 10 TABLET ORAL at 20:47

## 2023-01-01 RX ADMIN — AMLODIPINE BESYLATE 2.5 MG: 2.5 TABLET ORAL at 21:25

## 2023-01-01 RX ADMIN — AMLODIPINE BESYLATE 2.5 MG: 2.5 TABLET ORAL at 20:56

## 2023-01-01 RX ADMIN — INSULIN ASPART 1 UNITS: 100 INJECTION, SOLUTION INTRAVENOUS; SUBCUTANEOUS at 12:52

## 2023-01-01 RX ADMIN — ESCITALOPRAM 5 MG: 5 TABLET, FILM COATED ORAL at 08:14

## 2023-01-01 RX ADMIN — INSULIN ASPART 1 UNITS: 100 INJECTION, SOLUTION INTRAVENOUS; SUBCUTANEOUS at 18:06

## 2023-01-01 RX ADMIN — ESCITALOPRAM 5 MG: 5 TABLET, FILM COATED ORAL at 09:28

## 2023-01-01 RX ADMIN — LISINOPRIL 2.5 MG: 2.5 TABLET ORAL at 09:28

## 2023-01-01 RX ADMIN — INSULIN ASPART 3 UNITS: 100 INJECTION, SOLUTION INTRAVENOUS; SUBCUTANEOUS at 13:10

## 2023-01-01 RX ADMIN — METOPROLOL TARTRATE 25 MG: 25 TABLET, FILM COATED ORAL at 08:15

## 2023-01-01 RX ADMIN — METOPROLOL TARTRATE 25 MG: 25 TABLET, FILM COATED ORAL at 09:02

## 2023-01-01 RX ADMIN — AMLODIPINE BESYLATE 2.5 MG: 2.5 TABLET ORAL at 09:28

## 2023-01-01 RX ADMIN — INSULIN ASPART 2 UNITS: 100 INJECTION, SOLUTION INTRAVENOUS; SUBCUTANEOUS at 12:32

## 2023-01-01 RX ADMIN — METOPROLOL TARTRATE 25 MG: 25 TABLET, FILM COATED ORAL at 09:28

## 2023-01-01 RX ADMIN — POLYETHYLENE GLYCOL 3350 17 G: 17 POWDER, FOR SOLUTION ORAL at 10:49

## 2023-01-01 RX ADMIN — ESCITALOPRAM 5 MG: 5 TABLET, FILM COATED ORAL at 07:59

## 2023-01-01 RX ADMIN — INSULIN ASPART 1 UNITS: 100 INJECTION, SOLUTION INTRAVENOUS; SUBCUTANEOUS at 13:11

## 2023-01-01 RX ADMIN — INSULIN ASPART 1 UNITS: 100 INJECTION, SOLUTION INTRAVENOUS; SUBCUTANEOUS at 09:25

## 2023-01-01 RX ADMIN — LISINOPRIL 2.5 MG: 2.5 TABLET ORAL at 09:02

## 2023-01-01 RX ADMIN — METOPROLOL TARTRATE 25 MG: 25 TABLET, FILM COATED ORAL at 07:59

## 2023-01-01 RX ADMIN — QUETIAPINE FUMARATE 25 MG: 25 TABLET ORAL at 00:15

## 2023-01-01 RX ADMIN — INSULIN ASPART 1 UNITS: 100 INJECTION, SOLUTION INTRAVENOUS; SUBCUTANEOUS at 08:03

## 2023-01-01 RX ADMIN — INSULIN ASPART 1 UNITS: 100 INJECTION, SOLUTION INTRAVENOUS; SUBCUTANEOUS at 16:57

## 2023-01-01 RX ADMIN — INSULIN ASPART 1 UNITS: 100 INJECTION, SOLUTION INTRAVENOUS; SUBCUTANEOUS at 08:15

## 2023-01-01 RX ADMIN — LISINOPRIL 2.5 MG: 2.5 TABLET ORAL at 07:59

## 2023-01-01 RX ADMIN — QUETIAPINE FUMARATE 25 MG: 25 TABLET ORAL at 16:09

## 2023-01-01 RX ADMIN — Medication 1 MG: at 00:49

## 2023-01-01 RX ADMIN — METOPROLOL TARTRATE 25 MG: 25 TABLET, FILM COATED ORAL at 20:31

## 2023-01-01 RX ADMIN — AMLODIPINE BESYLATE 10 MG: 10 TABLET ORAL at 09:02

## 2023-01-01 RX ADMIN — METOPROLOL TARTRATE 25 MG: 25 TABLET, FILM COATED ORAL at 20:47

## 2023-01-01 RX ADMIN — INSULIN ASPART 1 UNITS: 100 INJECTION, SOLUTION INTRAVENOUS; SUBCUTANEOUS at 16:47

## 2023-01-01 RX ADMIN — ACETAMINOPHEN 650 MG: 325 TABLET, FILM COATED ORAL at 12:14

## 2023-01-01 RX ADMIN — ACETAMINOPHEN 650 MG: 325 TABLET, FILM COATED ORAL at 11:52

## 2023-01-01 RX ADMIN — HYDRALAZINE HYDROCHLORIDE 10 MG: 20 INJECTION, SOLUTION INTRAMUSCULAR; INTRAVENOUS at 02:57

## 2023-01-01 ASSESSMENT — ACTIVITIES OF DAILY LIVING (ADL)
ADLS_ACUITY_SCORE: 63
ADLS_ACUITY_SCORE: 55
ADLS_ACUITY_SCORE: 51
ADLS_ACUITY_SCORE: 55
ADLS_ACUITY_SCORE: 51
ADLS_ACUITY_SCORE: 55
ADLS_ACUITY_SCORE: 65
ADLS_ACUITY_SCORE: 51
ADLS_ACUITY_SCORE: 57
ADLS_ACUITY_SCORE: 63
ADLS_ACUITY_SCORE: 51
ADLS_ACUITY_SCORE: 51
ADLS_ACUITY_SCORE: 69
ADLS_ACUITY_SCORE: 51
ADLS_ACUITY_SCORE: 59
ADLS_ACUITY_SCORE: 65
ADLS_ACUITY_SCORE: 61
ADLS_ACUITY_SCORE: 63
ADLS_ACUITY_SCORE: 55
ADLS_ACUITY_SCORE: 51
ADLS_ACUITY_SCORE: 51
ADLS_ACUITY_SCORE: 63
ADLS_ACUITY_SCORE: 51
ADLS_ACUITY_SCORE: 65
ADLS_ACUITY_SCORE: 65
ADLS_ACUITY_SCORE: 57
ADLS_ACUITY_SCORE: 67
ADLS_ACUITY_SCORE: 59
ADLS_ACUITY_SCORE: 67
ADLS_ACUITY_SCORE: 59
ADLS_ACUITY_SCORE: 51
ADLS_ACUITY_SCORE: 57
ADLS_ACUITY_SCORE: 57
ADLS_ACUITY_SCORE: 61
ADLS_ACUITY_SCORE: 59
ADLS_ACUITY_SCORE: 51
ADLS_ACUITY_SCORE: 67
ADLS_ACUITY_SCORE: 63
ADLS_ACUITY_SCORE: 67
ADLS_ACUITY_SCORE: 51
ADLS_ACUITY_SCORE: 61
ADLS_ACUITY_SCORE: 51
ADLS_ACUITY_SCORE: 59
ADLS_ACUITY_SCORE: 55
ADLS_ACUITY_SCORE: 63
ADLS_ACUITY_SCORE: 65
ADLS_ACUITY_SCORE: 51
ADLS_ACUITY_SCORE: 51
ADLS_ACUITY_SCORE: 61
ADLS_ACUITY_SCORE: 67
ADLS_ACUITY_SCORE: 67
ADLS_ACUITY_SCORE: 69
ADLS_ACUITY_SCORE: 51
ADLS_ACUITY_SCORE: 57
ADLS_ACUITY_SCORE: 59
ADLS_ACUITY_SCORE: 61
ADLS_ACUITY_SCORE: 65
ADLS_ACUITY_SCORE: 67
ADLS_ACUITY_SCORE: 51
ADLS_ACUITY_SCORE: 63
ADLS_ACUITY_SCORE: 51
ADLS_ACUITY_SCORE: 55
ADLS_ACUITY_SCORE: 61
ADLS_ACUITY_SCORE: 51
ADLS_ACUITY_SCORE: 63

## 2023-01-01 NOTE — PLAN OF CARE
Orientation/Cognitive: Head CT neg. Conf. A to self. [place]. Inconsist following direction. Keeps falling asleep, easily woken by voice or light touch. Below baseline. Does most ADLs by self according to wife.   Extremities feel equal, symmetrical face, tongue midline, able to do finger to nose.   Unable to hold arms up for 10 seconds. Mostly able to follow finger with eyes. Unable to perform heel to shin or hold leg in air.   Observation Goals (Met/ Not Met): not met  Mobility Level/Assist Equipment: A1-2 gb/w. Not oob yet  Fall Risk (Y/N): Y  Behavior Concerns: N  Pain Management: grimace. Jc pain or where injury may be  Tele/VS/O2: Htn. 3L O2. Hx afib, aflutter  ABNL Lab/BG: bg 124  Diet: reg  Bowel/Bladder: incont. Cont at baseline  Skin Concerns: Skin tears on L arm  Drains/Devices: external cath  Tests/Procedures for next shift: tbd  Anticipated DC date & active delays: tbd  Patient Stated Goal for Today: jc

## 2023-01-01 NOTE — PROGRESS NOTES
Observation goals  PRIOR TO DISCHARGE        Comments: -  diagnostic tests and consults completed and resulted not met  -vital signs normal or at patient baseline not met  Nurse to notify provider when observation goals have been met and patient is ready for discharge.

## 2023-01-01 NOTE — CODE/RAPID RESPONSE
[facility] NP, RRT Note  1/1/2023  1700    Called to evaluate Mr. Leary for increasing weakness. Pt admitted after fall at home, worsening weakness/confusion. PMHx of HTN, widespread foraminal stenosis, aflutter, hx of seizure disorder (not on AEDs), and hx of hemorrhagic stroke (2012) w/ likely amyloid angiopathy. Baseline weakness, frequent falls, left leg shuffling, major neurocognitive impairment, labile affect, quick to anger/agitation.      Admitted yesterday, less confused in AM but worsened around 1700. Today, increased confusion this AM, did not know who his wife was. He typically naps 1-2 times per day, but has not napped today. No new respiratory, GI, headache, nausea/vomiting, eating/drinking. Afebrile. No obvious urinary symptoms, but pt unable to self report ROS. BP well controlled.     /69   Pulse 60   Temp 97.5  F (36.4  C) (Axillary)   Resp 17   SpO2 91%     On my arrival, pt is lying in bed, labile affect (occasionally yells out in anger at nursing staff). He waxes/wanes from appropriate conversation to unable to follow commands. He shows nonverbal cues of generalized pain, but unable to localize anything. Keeps eyes closed. He is extremely inattentive. Appears somewhat photophobic. Does not appear post ictal.     Goals of Care  I had a long discussion with the patient's wife, Alexsandra. While his clinical appearance is consistent w/ worsening delirium, I cannot rule out ICH (particularly given his heightened risk). Alexsandra shares she feels his quality of life poor, and she would not want to pursue any treatment if Ernie suffered an acute neurological event. We discussed potentially re-imaging pt's brain to ensure there is not a delayed ICH, but given this would not change our management, I recommended a conservative approach of watching/waiting - Alexsandra agrees.     Diagnosis: Acute delirium 2/2 underlying cognitive impairment, cannot rule out delayed ICH     Plan:  - allow for rest,  reorientation, minimize sleep interruptions at night   - no escalation of care.     - If pt dramatically worsens, would call wife before pursuing further imaging    Physical Exam  Vitals and nursing note reviewed.   Constitutional:       General: He is in acute distress.      Appearance: Normal appearance. He is not ill-appearing, toxic-appearing or diaphoretic.   HENT:      Head: Atraumatic.      Mouth/Throat:      Mouth: Mucous membranes are moist.   Cardiovascular:      Rate and Rhythm: Normal rate and regular rhythm.      Heart sounds: Normal heart sounds. No murmur heard.  Pulmonary:      Effort: Pulmonary effort is normal. No respiratory distress.      Breath sounds: Normal breath sounds. No stridor. No wheezing or rhonchi.   Abdominal:      General: Abdomen is flat. There is no distension.      Palpations: Abdomen is soft.      Tenderness: There is no abdominal tenderness.   Skin:     General: Skin is warm and dry.   Neurological:      General: No focal deficit present.      Mental Status: He is alert. He is disoriented and confused.      Cranial Nerves: No cranial nerve deficit, dysarthria or facial asymmetry.      Motor: Weakness present.      Comments: Generalized weakness, maybe slightly weaker w/ LUE, but difficult to tell.    Neuro exam difficult due to inattentiveness   Psychiatric:         Attention and Perception: He is inattentive.         Mood and Affect: Affect is labile.       Medical Decision Making   MANAGEMENT DISCUSSED with the following over the past 24 hours: bedside RN, wife, patient   NOTE(S)/MEDICAL RECORDS REVIEWED over the past 24 hours: neuro outpatient [place], hospitalist MD note, ED MD note  Tests personally interpreted in the past 24 hours:  - HEAD CT showing no ICH  SUPPLEMENTAL HISTORY, in addition to the patient's history, over the past 24 hours obtained from:   - Spouse or significant other  Medical complexity over the past 24 hours:  - Decision to DE-ESCALATE CARE based on  prognosis      KI Fuller [facility]  Hospitalist Service  House Officer  Pager: [phone] (7a - 6p)

## 2023-01-01 NOTE — PROGRESS NOTES
[facility]    Medicine Progress Note - Hospitalist Service    Date of Admission:  12/31/2022    Assessment & Plan      Ernie Leary is a 89 year old male admitted on 12/31/2022 with generalized weakness.    Generalized weakness  Altered mental status with history of dementia, suspect delirium  Rule out UTI and viral infection  P/w generalized weakness from home with a fall  Workup largely unremarkable, with normal labs, normal head ct, and CT CAP without obvious etiology  Wife apparently has symptoms of viral gastroenteritis  Wide differential diagnosis possible in a patient nearing 89 years old with dementia including infection (URI, UTI), stroke (grossly nonfocal, head CT negative)  UA and viral panel unrevealing   procalcitonin undetectable     - unclear what precipitated this, but suspect his fall and time struggling on ground was start of delirium episode, no clear other source/cause     - watch for gastroenteritis symptoms  - fall precautions  - PT consult  - discussed consideration of MRI with wife, due to likely need for sedation with MRI, unlikely it will change managment, will defer. Wife agrees  - delirium management     Hypertension  Plan  - prn hydralazine    Widespread foraminal stenosis  Plan  - PT consult    Pancreatic cysts  Mesenteric calcification  Seen incidentally on CT  Plan  - Follow-up PCP to determine if further work-up is desired.  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 Alcantar note 11/21/2022)  Plan  - resume medications once verified  - there is report he was weaned off of keppra  - note patient is high risk for hemorrhage with antiplatelet or anticoagulant with his amyloid angiopathy         Diet: Moderate Consistent Carb (60 g CHO per Meal) Diet    DVT Prophylaxis: Pneumatic Compression Devices  Mclaughlin Catheter: Not  present  Lines: None     Cardiac Monitoring: None  Code Status: No CPR- Do NOT Intubate      Clinically Significant Risk Factors Present on Admission                  # Hypertension: home medication list includes antihypertensive(s)     # DMII: A1C = 6.9 % (Ref range: 0.0 - 5.6 %) within past 3 months            Disposition Plan      Expected Discharge Date: 01/01/2023                  Jonathan Cisneros MD  Hospitalist Service  [facility]  Securely message with [employer] (more info)  Text page via [employer] Paging/Directory   ______________________________________________________________________    Interval History   Intermittent agitated/angry this AM. More confused than normal per wife. He only intermittently recognized her. Nothing uncovered as to why this happened. No source of infection.    Physical Exam   Vital Signs: Temp: 97.2  F (36.2  C) Temp src: Oral BP: (!) 151/86 Pulse: 72   Resp: 17 SpO2: 95 % O2 Device: Nasal cannula Oxygen Delivery: 3 LPM  Weight: 0 lbs 0 oz    Constitutional: Awake, alert to self, cooperative intermittently, no apparent cardiopulmonary distress.  Eyes: Conjunctiva and pupils examined and normal.  HEENT: Moist mucous membranes, normal dentition.  Respiratory: Clear to auscultation bilaterally, no crackles or wheezing.  Cardiovascular: Regular rate and rhythm, normal S1 and S2, and no murmur noted.  GI: Soft, non-distended, non-tender, normal bowel sounds.  Lymph/Hematologic: No anterior cervical or supraclavicular adenopathy.  Skin: No rashes, no cyanosis, no edema noted on exposed skin.  Musculoskeletal: No joint swelling, erythema or tenderness. No gross bony abnormalities  Neurologic: Cranial nerves 2-12 intact, normal strength and sensation in bilateral arms and leg but does require significant prompting to obey 1 step commands.  Psychiatric: Alert, oriented to person, place and time, no obvious anxiety or depression.      Medical Decision Making        MANAGEMENT DISCUSSED with the following over the past 24 hours: RN and aide   NOTE(S)/MEDICAL RECORDS REVIEWED over the past 24 hours: H&P, RN notes, prior neurology and PCP notes about baseline  Tests REVIEWED in the past 24 hours:  - CMP  - CBC  - Procalcitonin  - A1c and UA  Tests personally interpreted in the past 24 hours:  - HEAD CT showing no acute findings  SUPPLEMENTAL HISTORY, in addition to the patient's history, over the past 24 hours obtained from:   - Spouse or significant other      Data     I have personally reviewed the following data over the past 24 hrs:    7.8  \   12.3 (L)   / 230     135 99 20 /  148 (H)   3.7 29 1.01 \       TSH: N/A T4: N/A A1C: N/A       Procal: N/A CRP: N/A Lactic Acid: N/A         Imaging results reviewed over the past 24 hrs:   No results found for this or any previous visit (from the past 24 hour(s)).

## 2023-01-01 NOTE — CONSULTS
Care Management Initial Consult    General Information  Assessment completed with: Spouse or significant otherAlexsandra  Type of CM/SW Visit: CM Role Introduction    Primary Care Provider verified and updated as needed: Yes   Readmission within the last 30 days: no previous admission in last 30 days      Reason for Consult: discharge planning  Advance Care Planning:            Communication Assessment  Patient's communication style: spoken language (English or Bilingual)             Cognitive  Cognitive/Neuro/Behavioral: .WDL except, orientation  Level of Consciousness: confused  Arousal Level: arouses to touch/gentle shaking  Orientation: disoriented to, place, time, situation  Mood/Behavior: cooperative  Best Language: 1 - Mild to moderate  Speech: slow, garbled    Living Environment:   People in home: spouse  Alexsandra  Current living Arrangements: independent living facility ([facility])      Able to return to prior arrangements: other (see comments) (pending therapy and final reccs)       Family/Social Support:  Care provided by: spouse/significant other, other (see comments) (private duty services through [employer])  Provides care for: no one, unable/limited ability to care for self  Marital Status:   Wife, Children  Alexsandra       Description of Support System: Supportive, Involved    Support Assessment: Adequate family and caregiver support, Adequate social supports    Current Resources:   Patient receiving home care services: No     Community Resources:    Equipment currently used at home: walker, rolling (with seated bench)  Supplies currently used at home:      Employment/Financial:  Employment Status: retired        Financial Concerns: No concerns identified   Referral to Financial Worker: No       Lifestyle & Psychosocial Needs:  Social Determinants of Health     Tobacco Use: Medium Risk     Smoking Tobacco Use: Former     Smokeless Tobacco Use: Never     Passive Exposure: Not on file   Alcohol Use:  "Not on file   Financial Resource Strain: Not on file   Food Insecurity: Not on file   Transportation Needs: Not on file   Physical Activity: Not on file   Stress: Not on file   Social Connections: Not on file   Intimate Partner Violence: Not on file   Depression: At risk     PHQ-2 Score: 4   Housing Stability: Not on file       Functional Status:  Prior to admission patient needed assistance: per Spouse Alexsandra prior to this admission patient was ambulatory with walker and independent in most ADL's. Alexsandra has private duty services to help assist the patient with bathing/grooming 3x week and has a caregiver that was coming into the home 2 hours/day M-F.           Mental Health Status:          Chemical Dependency Status:                Values/Beliefs:  Spiritual, Cultural Beliefs, Synagogue Practices, Values that affect care:                 Additional Information:  Writer met with patient's Spouse Alexsandra at the bedside. Writer explained role and scope of safe discharge planning.  Writer gave Alexsandra a copy of the [facility] for medicare observation and answered questions regarding observation status.  Per Alexsandra she is the primary caregiver for the patient, prior to this admission patient was ambulatory with use of a walker with seated bench and was independent with most ADL's with prompting.  Alexsandra stated they reside at [facility] and she recently set up services through [employer] to come in the home to help the patient with bathing/grooming 2 hours/day M-F, this also allows spouse to go out if she needs anything, however she mentioned that this is \"spotty\" as the caregiver is now out for medical leave thorough 1/28/23 and patient will only be receiving 3 weekly visits.  Writer discussed the following with Alexsandra today:  1. PT/OT to see the patient today vs tomorrow. Alexsandra stating if the patient is unable to get around without assistance she will most likely not be able to provide the care that he will " "need.   Writer explained that if PT/OT recommended TCU we would send referrals to TCU for availability (most likely memory care TCU). Writer went over medical necessity and Medicare.gov star ratings.  Writer went over indications for TCU stay and that it would be 7-14 days with the plan for the patient to go back to previous level of living.  Alexsandra was presented with TCU list and will look it over tonight.  2. Alexsandra mentions the possibility of the patient going to [facility], she stated that both she and the patient had to stay there together when she was needing assistance.  Alexsandra is aware that this is private pay and we would need to send information to [facility] to see if they would be able to provide the level of care needed. Our team will connect with [facility] tomorrow once PT/OT reccs are in. Alexsandra would plan on taking a bus/cab to see him if they accept.  3. Alexsandra mentioned that over the years she has put the patient and herself on \"waiting lists\" for [facility], however she is still independent.  She may want additional resources for ongoing recommendations going forward.    4. Alxesandra stated that she recently gave up driving so she works with someone through the community to get rides for appointments etc.  She is aware that we can assist with transportation at time of discharge if needed. Her Daughter is currently visiting from [place] just for the holiday and has been providing transportation.    Alexsandra is aware that we will continue to follow for further discharge planning needs and recommendations going forward.  She plans on being up to the room 1/2/23 with the patient and will be available for further discussions.     Mary Rock RN, BSN, [facility]   Care Transitions Specialist   [facility]  Care Transitions Specialist   [address] [place]. [address]  [email or web address]  Office:[phone] Fax: [phone]  [facility]        "

## 2023-01-01 NOTE — PLAN OF CARE
Goal Outcome Evaluation:                      Orientation/Cognitive: disoriented to time situation and place  Observation Goals (Met/ Not Met): not met  Mobility Level/Assist Equipment: A1-2 gb/w. Not oob yet  Fall Risk (Y/N): Y  Behavior Concerns: N  Pain Management: grimace. Jc pain or where injury may be  Tele/VS/O2: Htn. 3L O2. Hx afib, aflutter  ABNL Lab/BG: bg 124  Diet: reg  Bowel/Bladder: incont. Cont at baseline  Skin Concerns: Skin tears on L arm  Drains/Devices: external cath  Tests/Procedures for next shift: tbd  Anticipated DC date & active delays: tbd  Patient Stated Goal for Today: jc          Observation goals  PRIOR TO DISCHARGE        Comments: -  diagnostic tests and consults completed and resulted not met  -vital signs normal or at patient baseline not met  Nurse to notify provider when observation goals have been met and patient is ready for discharge.

## 2023-01-02 NOTE — PROGRESS NOTES
Observation goals  PRIOR TO DISCHARGE          -diagnostic tests and consults completed and resulted: Not met  -vital signs normal or at patient baseline: Not met    Nurse to notify provider when observation goals have been met and patient is ready for discharge.

## 2023-01-02 NOTE — PLAN OF CARE
Goal Outcome Evaluation:                      Orientation/Cognitive: disoriented to time situation and place  Observation Goals (Met/ Not Met): not met  Mobility Level/Assist Equipment: A1-2 gb/w. Not oob yet  Fall Risk (Y/N): Y  Behavior Concerns: N  Pain Management: grimace. Jc pain or where injury may be  Tele/VS/O2: vss  ABNL Lab/BG: see chart  Diet: reg  Bowel/Bladder: incont. Cont at baseline  Skin Concerns: Skin tears on L arm  Drains/Devices: external cath  Tests/Procedures for next shift: tbd  Anticipated DC date & active delays: tbd  Patient Stated Goal for Today: jc      Observation goals  PRIOR TO DISCHARGE       Comments: -  diagnostic tests and consults completed and resulted not met  -vital signs normal or at patient baseline  met  Nurse to notify provider when observation goals have been met and patient is ready for discharge.

## 2023-01-02 NOTE — PROGRESS NOTES
Observation goals  PRIOR TO DISCHARGE        Comments: -  diagnostic tests and consults completed and resulted not met  -vital signs normal or at patient baseline  met  Nurse to notify provider when observation goals have been met and patient is ready for discharge.

## 2023-01-02 NOTE — PROGRESS NOTES
Orientation/Cognitive: A to self. RRT today. Morning neuros able to follow direction, equal strength, able to touch finger/nose, able heel to shin, able to raise shoulders. Symmetrical. No drift.   Midday not following direction as well, still equal strength and symmetrical. No drift. Able participate in neuros to nose etc with much encouragement.   Last neuro unable to follow direction or participate in neuro exam. RRT called.   Observation Goals (Met/ Not Met): not met  Mobility Level/Assist Equipment: A1 gb/w this am and most of day. Needed A of 3 to pivot from chair to bed for RRT.   Fall Risk (Y/N): Y  Behavior Concerns: N  Pain Management: tylenol given x1. Jc pain. Generally denies pain. Neck and jaw pain mentioned  Tele/VS/O2: VSS on RA  ABNL Lab/BG: bg 140  Diet: mod carb  Bowel/Bladder: external cath. Bladder scan 62 mL  Skin Concerns: skin tears L arm. Wife states pt gets skin tears regularly rt thin skin. Wife changed dressings.   Drains/Devices: external cath  Tests/Procedures for next shift: no  Anticipated DC date & active delays: tbd  Patient Stated Goal for Today: jc

## 2023-01-02 NOTE — PROGRESS NOTES
Observation goals  PRIOR TO DISCHARGE        -diagnostic tests and consults completed and resulted: Not met    -vital signs normal or at patient baseline: Met    Nurse to notify provider when observation goals have been met and patient is ready for discharge.

## 2023-01-02 NOTE — UTILIZATION REVIEW
Concurrent stay review; Secondary Review Determination    [facility]        Under the authority of the Utilization Management Committee, the utilization review process indicated a secondary review on the above patient.  The review outcome is based on review of the medical records, discussions with staff, and applying clinical experience noted on the date of the review.        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  No acute metabolic/infectious etiologies noted.  Patient at risk of dementia associated delirium and behavioral difficulties.  Due to patient's generalized weakness patient most likely would require increased level of care at discharge.  After initial work-up to rule out reversible etiologies, patient now awaiting placement.    Patient delayed discharge is related to disposition, there is no medical necessity for inpatient admission at the time of this review. If there is a change in patient status, please resend for review.    The information on this document is developed by the utilization review team in order for the business office to ensure compliance.  This only denotes the appropriateness of proper admission status and does not reflect the quality of care rendered.       The definitions of Inpatient Status and Observation Status used in making the determination above are those provided in the CMS Coverage Manual, Chapter 1 and Chapter 6, section 70.4.       Sincerely,    Shiva Avery MD, MD

## 2023-01-02 NOTE — PROGRESS NOTES
[facility]    HOSPITALIST PROGRESS NOTE :   --------------------------------------------------    Date of Admission:  12/31/2022    Cumulative Summary: Ernie Leary is a 89 year old male who was admitted on 12/31/2022 with generalized weakness     Assessment & Plan     Generalized weakness  Altered mental status with history of dementia, suspect delirium  Rule out UTI and viral infection  P/w generalized weakness from home with a fall  Workup largely unremarkable, with normal labs, normal head ct, and CT CAP without obvious etiology  Wife apparently has symptoms of viral gastroenteritis  Wide differential diagnosis possible in a patient nearing 89 years old with dementia including infection (URI, UTI), stroke (grossly nonfocal, head CT negative)  UA and viral panel unrevealing   procalcitonin undetectable      - unclear what precipitated this, but suspect his fall and time struggling on ground was start of delirium episode, no clear other source/cause   -Patient care was assumed this morning, patient was seen and examined, wife also present at the bedside  -Also discussed the care with bedside nursing, patient did have a RRT response was called in last evening due to the concern for confusion but later did have a restful night.  -So far patient has not developed any symptoms of gastroenteritis but will continue to monitor patient closely.  -Wife was present at the bedside and did have concerns regarding patient current clinical status, she told me that patient is status has been declining since the last 6 months but now since last week he is not even able to follow simple commands like getting out of the bed and going to the bathroom and is not even recognizing her which she was able to do it last week.  -Will order small dose Seroquel 12.5 mg at bedtime.  -At this time wife does not want to proceed with aggressive interventions, MRI option has been discussed with wife as patient will  probably require sedation for MRI plan is to defer it at this point, wife is in agreement.  -Wife also inquired about option of hospice care at the time of discharge, I will consult  to evaluate if patient does qualify for hospice services.  -Continue delirium management.     Hypertension  -- Continue as needed hydralazine which is available     Widespread foraminal stenosis  --Physical therapy has been consulted, patient has been recommended to be discharged to either TCU versus long-term care facility     Pancreatic cysts  Mesenteric calcification  Seen incidentally on CT  Plan  - Follow-up PCP to determine if further work-up is desired.  Not recommended given advanced age and progressive dementia.     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 Alcantar note 11/21/2022)  Plan  - resume medications once verified  - there is report he was weaned off of keppra  - note patient is high risk for hemorrhage with antiplatelet or anticoagulant with his amyloid angiopathy    Clinically Significant Risk Factors Present on Admission                  # Hypertension: home medication list includes antihypertensive(s)     # DMII: A1C = 6.9 % (Ref range: 0.0 - 5.6 %) within past 3 months            Diet: Moderate Consistent Carb (60 g CHO per Meal) Diet    Mclaughlin Catheter: Not present  DVT Prophylaxis: Pneumatic Compression Devices  Code Status: No CPR- Do NOT Intubate    The patient's care was discussed with the Bedside Nurse, Care Coordinator/, Patient and Patient's Family.    Disposition Plan      Expected Discharge Date: 01/03/2023      Destination: home;home with help/services;inpatient rehabilitation facility;other (comment) (wife is thinking about [facility] as an option (private pay) vs TCU)  Discharge Comments: PT Consult.     Entered: Chela Walden MD 01/02/2023, 5:10 PM      Medical Decision  Making       35 MINUTES SPENT BY ME on the date of service doing chart review, history, exam, documentation & further activities per the note.      Chela Walden MD, MD, FACP  Text Page (7am - 6pm)    ----------------------------------------------------------------------------------------------------------------------      Interval History   Patient care was assumed this morning, patient was seen and examined.  Patient is sitting in chair, wife also present at the bedside.  Most of the history is obtained by the wife, patient was currently oriented to person.  According to wife patient has been declining in the last few months but at least was able to eat and shave by himself and was able to go to the bathroom which has significantly declined in the last 3 to 4 days and she had to call EMS twice to get him off the floor and then get him out of the bed.  We discussed about possibility of patient requiring long-term care, he also inquired about hospice care and if patient can be discharged to facility with hospice care and I discussed with her that I will consult hospice services in we can evaluate patient.    -Data reviewed today: I reviewed all new labs and imaging results over the last 24 hours.    I personally reviewed no images or EKG's today.    Physical Exam   Temp: (!) 96.6  F (35.9  C) Temp src: Oral BP: (!) 100/90 Pulse: 64   Resp: 18 SpO2: 96 % O2 Device: None (Room air)    There were no vitals filed for this visit.  Vital Signs with Ranges  Temp:  [96.6  F (35.9  C)-98.4  F (36.9  C)] 96.6  F (35.9  C)  Pulse:  [64-77] 64  Resp:  [18-19] 18  BP: (100-172)/(73-96) 100/90  SpO2:  [92 %-96 %] 96 %  I/O last 3 completed shifts:  In: -   Out: 290 [Urine:290]    GENERAL: Alert , awake but confused, oriented to person only, NAD. Conversational, appropriate.   HEENT: Normocephalic. EOMI. No icterus or injection. Nares normal.   LUNGS: Clear to auscultation. No dyspnea at rest.   HEART: Regular rate. Extremities  perfused.   ABDOMEN: Soft, nontender, and nondistended. Positive bowel sounds.   EXTREMITIES: No LE edema noted.   NEUROLOGIC: Moves extremities x4 on command. No acute focal neurologic abnormalities noted.     Medications       amLODIPine  2.5 mg Oral BID     escitalopram  5 mg Oral Daily     insulin aspart  1-7 Units Subcutaneous TID AC     insulin aspart  1-5 Units Subcutaneous At Bedtime     lisinopril  2.5 mg Oral Daily     metoprolol tartrate  25 mg Oral BID     sodium chloride (PF)  3 mL Intracatheter Q8H       Data   Recent Labs   Lab 01/02/23  1620 01/02/23  1220 01/02/23  0759 01/01/23  0846 01/01/23  0610 12/31/22  1743 12/31/22  1112   WBC  --   --   --   --  7.8  --  9.4   HGB  --   --   --   --  12.3*  --  13.0*   MCV  --   --   --   --  90  --  91   PLT  --   --   --   --  230  --  250   NA  --   --   --   --  135  --  137   POTASSIUM  --   --   --   --  3.7  --  3.7   CHLORIDE  --   --   --   --  99  --  100   CO2  --   --   --   --  29  --  27   BUN  --   --   --   --  20  --  15   CR  --   --   --   --  1.01  --  0.88   ANIONGAP  --   --   --   --  7  --  10   SOCORRO  --   --   --   --  9.3  --  9.2   * 174* 152*   < > 134*   < > 146*   ALBUMIN  --   --   --   --   --   --  3.8   PROTTOTAL  --   --   --   --   --   --  7.2   BILITOTAL  --   --   --   --   --   --  1.2   ALKPHOS  --   --   --   --   --   --  63   ALT  --   --   --   --   --   --  17   AST  --   --   --   --   --   --  22    < > = values in this interval not displayed.       Imaging:   No results found for this or any previous visit (from the past 24 hour(s)).

## 2023-01-02 NOTE — PROGRESS NOTES
Orientation/Cognitive: Disoriented to place, time and situation.  Observation Goals (Met/ Not Met): Not met  Mobility Level/Assist Equipment: AX1 GBW  Fall Risk (Y/N): Y  Behavior Concerns: None  Pain Management: KIERA  Tele/VS/O2: VSS on RA  ABNL Lab/B, 14,357,[id number]  Diet: Mod carb  Bowel/Bladder: Incontinent  Skin Concerns: Scattered bruising and skin tear on L arm  Drains/Devices: PIV SL  Tests/Procedures for next shift:  Anticipated DC date & active delays: TBD  Patient Stated Goal for Today: KIERA

## 2023-01-02 NOTE — PROGRESS NOTES
01/02/23 1600   Appointment Info   Signing Clinician's Name / Credentials (PT) Nikky Herrera, PT, DPT   Quick Adds   Quick Adds Certification   Living Environment   People in Home spouse   Current Living Arrangements independent living facility   Home Accessibility no concerns   Living Environment Comments Patient lives with his wife at [facility].  There is an elevator.   Self-Care   Usual Activity Tolerance good   Current Activity Tolerance moderate   Equipment Currently Used at Home walker, rolling  (4WW)   Fall history within last six months yes   Number of times patient has fallen within last six months 7   Activity/Exercise/Self-Care Comment Per spouse, patient was able to dress self and had private care serives assist with bathing/grooming 3x/week.  Patient had a caregiver coming 2 hours/day Monday - Friday.  He was able to ambulate with a 4WW.   General Information   Onset of Illness/Injury or Date of Surgery 12/31/22   Referring Physician Jonathan Cisneros MD   Patient/Family Therapy Goals Statement (PT) Patient's wife would like to know why patient's cognition has worsened and get an explanation for why patient suddenly becomes weak/falls.   Pertinent History of Current Problem (include personal factors and/or comorbidities that impact the POC) 89 year old male admitted on 12/31/2022 with generalized weakness.   Existing Precautions/Restrictions fall   Cognition   Affect/Mental Status (Cognition) confused   Orientation Status (Cognition) oriented to;person   Follows Commands (Cognition) follows one-step commands;50-74% accuracy;delayed response/completion;increased processing time needed;physical/tactile prompts required;repetition of directions required;verbal cues/prompting required   Pain Assessment   Patient Currently in Pain No   Integumentary/Edema   Integumentary/Edema Comments Age-related skin changes, skin tears on L UE   Posture    Posture Forward head position;Protracted shoulders    Range of Motion (ROM)   Range of Motion ROM is WFL   Strength (Manual Muscle Testing)   Strength (Manual Muscle Testing) Able to perform R SLR;Able to perform L SLR;Deficits observed during functional mobility   Bed Mobility   Comment, (Bed Mobility) Not assessed, starting and ending session seated in chair   Transfers   Comment, (Transfers) Patient perform sit <> stand from chair with CGA-minAx1, hands pushing from armrest of chair.   Gait/Stairs (Locomotion)   Distance in Feet (Gait) 10' eval, 150' + 10' treatment   Comment, (Gait/Stairs) Patient ambulates 10' with FWW and minAx1, demonstrates shortened step length with shuffling gait.  Patient with forward flexed posture and pushing walker too far from body.   Balance   Balance Comments Impaired dynamic balance, significant falls history despite 4WW   Sensory Examination   Sensory Perception patient reports no sensory changes   Clinical Impression   Criteria for Skilled Therapeutic Intervention Yes, treatment indicated   PT Diagnosis (PT) Impaired functional mobility   Influenced by the following impairments Confusion/delirium, poor command following, generalized weakness and deconditioning, impaired balance, decreased activity tolerance, high falls risk   Functional limitations due to impairments Impaired independence with bed mobility, transfers, and gait   Clinical Presentation (PT Evaluation Complexity) Evolving/Changing   Clinical Presentation Rationale Clinical judgement, PMH, social support   Clinical Decision Making (Complexity) moderate complexity   Planned Therapy Interventions (PT) balance training;bed mobility training;gait training;home exercise program;postural re-education;patient/family education;strengthening;transfer training;progressive activity/exercise   Anticipated Equipment Needs at Discharge (PT) walker, rolling;wheelchair  (Patient owns a FWW and 4WW.  May benefit from a manual w/c.)   Risk & Benefits of therapy have been explained  evaluation/treatment results reviewed;care plan/treatment goals reviewed;risks/benefits reviewed;participants voiced agreement with care plan;participants included;patient;spouse/significant other   PT Total Evaluation Time   PT Eval, Moderate Complexity Minutes (20295) 10   Therapy Certification   Start of care date 01/02/23   Certification date from 01/02/23   Certification date to 01/09/23   Medical Diagnosis Generalized weakness, falls   Physical Therapy Goals   PT Frequency 4x/week   PT Predicted Duration/Target Date for Goal Attainment 01/09/23   PT Goals Bed Mobility;Transfers;Gait   PT: Bed Mobility Supervision/stand-by assist;Supine to/from sit;Rolling   PT: Transfers Supervision/stand-by assist;Sit to/from stand;Bed to/from chair;Assistive device   PT: Gait Supervision/stand-by assist;100 feet;Rolling walker   Interventions   Interventions Quick Adds Gait Training;Therapeutic Activity   Therapeutic Activity   Therapeutic Activities: dynamic activities to improve functional performance Minutes (70559) 24   Symptoms Noted During/After Treatment Fatigue   Treatment Detail/Skilled Intervention Patient greeted seated in chair, wife present and wrapping skin tear on L arm.  Patient and wife educated on role of PT in acute care setting.  Patient confused, does respond to name and intermittently answering questions appropriately.  Wife providing most details on prior level of function and home set-up.  Patient does state he is having no pain.  Patient cued for seated marches, resisted hip abd/adductions, and LAQs to progress strength and assess command following.  With simple commands and repetitions, patient is able to follow commands with ~50% accuracy.  Patient cued to perform x5 sit <> stands during session for functional strengthening, hand-over-hand cues to get patient to push from armrest of chair during sit > stand and patient repeatedly trying to pull on FWW.  Patient able to complete 4 sit <> stands with  CGA-minAx1 but needing minAx2 for final sit <> stand secondary to fatigue.  Patient returning to chair at end of session, left with call light in reach and chair alarm activated.  Extensive time discussing discharge recommendations with patient and spouse.  Patient not participating in conversation but discussed need for 24/7 Ax1 given increased confusion and high falls risk.  Spouse stating family is concerned that patient is beginning to require more assistance than she can safely provide.  Discussed TCU as option to progress safety and independence with functional mobility tasks, will depend on patient's cognition whether he is an appropriate candidate for rehab.  Otherwise, patient may require long term care at discharge if he continues to require 24/7 assist.   Gait Training   Gait Training Minutes (70138) 14   Symptoms Noted During/After Treatment (Gait Training) fatigue   Treatment Detail/Skilled Intervention Patient ambulates 10' in room and 150' in landa with FWW and minAx1, close W/c follow given impaired command following and history of sudden weakness causing falls.  Patient demonstrates slow, shuffling gait with forward flexed posture.  Patient cued for upright posture and forward gaze but improvement transient.  Patient also needing physical assistance to keep walker in closer proximity to body. With fatigue, gait becoming more unsteady and w/c used to return patient to room.  Following seated rest, patient cued to ambulate remaining 10' back to chair.  Patient needing increased time and heavy cuing to stand from wheelchair, very poor command following.  Recommend Ax2 when ambulating with FWW in room, high falls risk.   [place] Level (Gait Training) minimum assist (75% patient effort)   Assistive Device (Gait Training) rolling walker   PT Discharge Planning   PT Plan Progress gait distance, repeated sit <> stands, assess bed mob   PT Discharge Recommendation (DC Rec) Transitional Care Facility;Long  term care facility   PT Rationale for DC Rec Patient reside in [facility] with wife.  Patient presents with generalized weakness and worsening cognition.  Patient does have significant falls history and presents at increased falls risk today with poor command following and limited insight into deficits.  Patient needing Ax1-2 for sit <> stands and ambulation with rolling walker.  Given his increased need for caregiver assistance (and wife's ability to provide this level of assistance at 89), anticipate patient would benefit from TCU to progress safety and independence with functional mobility tasks.  He may require LTC if no evidence of learning during IP PT given worsened cognition.  Patient owns a FWW and 4WW, may benefit from a manual w/c.   PT Brief overview of current status Ax1-2 STS and gait FWW, rec Ax2 GB/FWW for nursing   Total Session Time   Timed Code Treatment Minutes 38   Total Session Time (sum of timed and untimed services) 48     [facility]  OUTPATIENT PHYSICAL THERAPY EVALUATION  PLAN OF TREATMENT FOR OUTPATIENT REHABILITATION  (COMPLETE FOR INITIAL CLAIMS ONLY)  Patient's Last Name, First Name, M.I.  YOB: 1924  Ernie Leary                        Provider's Name  [facility] Medical Record No.  [id number]                             Onset Date:  12/31/22   Start of Care Date:  01/02/23   Type:     _X_PT   ___OT   ___SLP Medical Diagnosis:  Generalized weakness, falls              PT Diagnosis:  Impaired functional mobility Visits from SOC:  1     See note for plan of treatment, functional goals and certification details    I CERTIFY THE NEED FOR THESE SERVICES FURNISHED UNDER        THIS PLAN OF TREATMENT AND WHILE UNDER MY CARE     (Physician co-signature of this document indicates review and certification of the therapy plan).

## 2023-01-02 NOTE — PROGRESS NOTES
Observation goals  PRIOR TO DISCHARGE        Comments: -  diagnostic tests and consults completed and resulted not met  -vital signs normal or at patient baseline  met  Nurse to notify provider when observation goals have been met and patient is ready for discharge.        no

## 2023-01-03 NOTE — PROGRESS NOTES
[facility]    HOSPITALIST PROGRESS NOTE :   --------------------------------------------------    Date of Admission:  12/31/2022    Cumulative Summary: Ernie Leary is a 89 year old male who was admitted on 12/31/2022 with generalized weakness     Assessment & Plan     Generalized weakness  Altered mental status with history of dementia, suspect delirium  Rule out UTI and viral infection  P/w generalized weakness from home with a fall  Workup largely unremarkable, with normal labs, normal head ct, and CT CAP without obvious etiology  Wife apparently has symptoms of viral gastroenteritis  Wide differential diagnosis possible in a patient nearing 89 years old with dementia including infection (URI, UTI), stroke (grossly nonfocal, head CT negative)  UA and viral panel unrevealing   procalcitonin undetectable      - unclear what precipitated this, but suspect his fall and time struggling on ground was start of delirium episode, no clear other source/cause   -Patient care was assumed this morning, patient was seen and examined, wife also present at the bedside  -Also discussed the care with bedside nursing, patient did have a RRT response was called in last evening due to the concern for confusion but later did have a restful night.  -So far patient has not developed any symptoms of gastroenteritis but will continue to monitor patient closely.  - On 01/02/23 Wife was present at the bedside and did have concerns regarding patient current clinical status, she told me that patient clinial status has been declining since the last 6 months but now since last week he is not even able to follow simple commands like getting out of the bed and going to the bathroom and is not even recognizing her which she was able to do it last week.  - 01/02 , started patient on scheduled Seroquel 12.5 mg at bedtime.  - 01/03; Will increase it to 25 mg scheduled at bedtime     -At this time wife does not want to  proceed with aggressive interventions, MRI option has been discussed with wife as patient will probably require sedation for MRI plan is to defer it at this point, wife is in agreement.  - Wife also inquired about option of hospice care at the time of discharge, consulted  to evaluate if patient does qualify for hospice services.  - Continue delirium management.  -Patient has required as needed dose of Seroquel and also last night to control agitation, remains more tired sleepy and unable to take any of his oral medications this morning  -Once patient is excepted to hospice care for outpatient settings, will probably stop most of his medications with plan to discharge patient to outpatient hospice facility.  - are following closely, awaiting response from hospice facility.  -Wife was not present at the bedside today, I did explain to wife yesterday that patient does not need to be evaluated by his primary care physician , we just need to be sure to take patient qualifies for outpatient hospice by hospice team and then patient can be discharged from hospital to outpatient hospice.     Essential Hypertension:    -- Continue as needed hydralazine which is available  -- will increase Norvasc to 10 mg po daily   -- Continue Lisinopril 2.5 mg po daily  -- Continue Metoprolol 25 mg po BID   -- If patient continues to refuse medications and will be accepted to hospice services then probably will stop some of his home medications to decrease pill burden.     Widespread foraminal stenosis  --Physical therapy has been consulted, patient has been recommended to be discharged to either TCU versus long-term care facility     Pancreatic cysts  Mesenteric calcification  Seen incidentally on CT  Plan  - Follow-up PCP to determine if further work-up is desired.  Not recommended given advanced age and progressive dementia.     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 Alcantar note 11/21/2022)  Plan  - resume medications once verified  - there is report he was weaned off of keppra  - note patient is high risk for hemorrhage with antiplatelet or anticoagulant with his amyloid angiopathy    Clinically Significant Risk Factors Present on Admission                  # Hypertension: home medication list includes antihypertensive(s)     # DMII: A1C = 6.9 % (Ref range: 0.0 - 5.6 %) within past 3 months            Diet: Moderate Consistent Carb (60 g CHO per Meal) Diet    M[place] Catheter: Not present  DVT Prophylaxis: Pneumatic Compression Devices  Code Status: No CPR- Do NOT Intubate    The patient's care was discussed with the Bedside Nurse, Care Coordinator/, Patient and Patient's Family.    Disposition Plan     Expected Discharge Date: 01/03/2023      Destination: home;home with help/services;inpatient rehabilitation facility;other (comment) (wife is thinking about walker care suites as an option (private pay) vs TCU)  Discharge Comments: PT Consult.     Entered: Chela Walden MD 01/03/2023, 7:52 AM   Tentative discharge to facility with hospice care tomorrow if safe disposition plan is available.        Medical Decision Making       35 MINUTES SPENT BY ME on the date of service doing chart review, history, exam, documentation & further activities per the note.      Chela Walden MD, MD, FACP  Text Page (7am - 6pm)    ----------------------------------------------------------------------------------------------------------------------      Interval History      Patient was seen and examined this morning, plan of care was also discussed in detail with bedside nursing, at the time of my evaluation this morning wife was not present but I had a long discussion with her yesterday regarding plan of care.    Patient did require as needed dose of Seroquel in addition to his a scheduled Seroquel last night, last dose was  given at 3 AM, patient was very restless the whole night but now is sleepy from medication and from lack of sleep.    Discussed with nursing that we will let patient rest and hopefully patient can take his oral medications later, I also discussed with them regarding changing Norvasc to once a day to increase the dose and to also decrease the pill burden.    Appreciate  looking into safe disposition planning, working on evaluation if patient would qualify for outpatient hospice.    -Data reviewed today: I reviewed all new labs and imaging results over the last 24 hours.    I personally reviewed no images or EKG's today.    Physical Exam   Temp: 97.6  F (36.4  C) Temp src: Oral BP: (!) 142/79 Pulse: 64   Resp: 20 SpO2: 93 % O2 Device: None (Room air)    There were no vitals filed for this visit.  Vital Signs with Ranges  Temp:  [96.6  F (35.9  C)-98  F (36.7  C)] 97.6  F (36.4  C)  Pulse:  [64-80] 64  Resp:  [18-20] 20  BP: (100-195)/() 142/79  SpO2:  [93 %-96 %] 93 %  I/O last 3 completed shifts:  In: 240 [P.O.:240]  Out: -     GENERAL: Alert , awake but confused, oriented to person only, NAD. Conversational, appropriate.   HEENT: Normocephalic. EOMI. No icterus or injection. Nares normal.   LUNGS: Clear to auscultation. No dyspnea at rest.   HEART: Regular rate. Extremities perfused.   ABDOMEN: Soft, nontender, and nondistended. Positive bowel sounds.   EXTREMITIES: No LE edema noted.   NEUROLOGIC: Moves extremities x4 on command. No acute focal neurologic abnormalities noted.     Medications       amLODIPine  5 mg Oral BID     escitalopram  5 mg Oral Daily     insulin aspart  1-7 Units Subcutaneous TID AC     insulin aspart  1-5 Units Subcutaneous At Bedtime     lisinopril  2.5 mg Oral Daily     metoprolol tartrate  25 mg Oral BID     QUEtiapine  12.5 mg Oral At Bedtime     sodium chloride (PF)  3 mL Intracatheter Q8H       Data   Recent Labs   Lab 01/02/23  2114 01/02/23  1620 01/02/23  1220  01/01/23  0846 01/01/23  0610 12/31/22  1743 12/31/22  1112   WBC  --   --   --   --  7.8  --  9.4   HGB  --   --   --   --  12.3*  --  13.0*   MCV  --   --   --   --  90  --  91   PLT  --   --   --   --  230  --  250   NA  --   --   --   --  135  --  137   POTASSIUM  --   --   --   --  3.7  --  3.7   CHLORIDE  --   --   --   --  99  --  100   CO2  --   --   --   --  29  --  27   BUN  --   --   --   --  20  --  15   CR  --   --   --   --  1.01  --  0.88   ANIONGAP  --   --   --   --  7  --  10   SOCORRO  --   --   --   --  9.3  --  9.2   * 168* 174*   < > 134*   < > 146*   ALBUMIN  --   --   --   --   --   --  3.8   PROTTOTAL  --   --   --   --   --   --  7.2   BILITOTAL  --   --   --   --   --   --  1.2   ALKPHOS  --   --   --   --   --   --  63   ALT  --   --   --   --   --   --  17   AST  --   --   --   --   --   --  22    < > = values in this interval not displayed.       Imaging:   No results found for this or any previous visit (from the past 24 hour(s)).

## 2023-01-03 NOTE — PROGRESS NOTES
Observation goals  PRIOR TO DISCHARGE       Comments: -diagnostic tests and consults completed and resulted Not Met  -vital signs normal or at patient baseline Not met  Nurse to notify provider when observation goals have been met and patient is ready for discharge.

## 2023-01-03 NOTE — PROGRESS NOTES
Observation goals  PRIOR TO DISCHARGE         -diagnostic tests and consults completed and resulted; Not met    -vital signs normal or at patient baseline: Not met    Nurse to notify provider when observation goals have been met and patient is ready for discharge.

## 2023-01-03 NOTE — PROGRESS NOTES
Pt very sleepy this morning. Not opening eyes. Yells at staff when providing cares. Per report, pt was up all night, very restless and agitated. Prn seroquel was given early morning around 0300 am. Sepsis BPA fired. Lactic drawn.

## 2023-01-03 NOTE — PLAN OF CARE
Goal Outcome Evaluation:  Orientation/Cognitive: Disoriented to place, time and situation.  Observation Goals (Met/ Not Met): Not met  Mobility Level/Assist Equipment: AX2 GBW  Fall Risk (Y/N): Y  Behavior Concerns:Impulsive, agitated, anxious, restless. PRN seroquel given with some effectiveness  Pain Management: Appears comfortable, no non--verbal S&S of pain noted  Tele/VS/O2: VSS except for HTN, PRN hydralazine given  ABNL Lab/BG: No new lab results  Diet: Mod carb  Bowel/Bladder: Incontinent  Skin Concerns: Scattered bruising and skin tear on L arm  Drains/Devices: PIV SL  Tests/Procedures for next shift:None  Anticipated DC date & active delays: TBD        Observation goals  PRIOR TO DISCHARGE       Comments: -diagnostic tests and consults completed and resulted Not Met  -vital signs normal or at patient baseline Not met  Nurse to notify provider when observation goals have been met and patient is ready for discharge.

## 2023-01-03 NOTE — CONSULTS
Care Management Follow Up    Length of Stay (days): 0    Expected Discharge Date: 01/04/2023     Concerns to be Addressed: discharge planning     Patient plan of care discussed at interdisciplinary rounds: Yes    Anticipated Discharge Disposition: Transitional Care, Other (Comments)     Anticipated Discharge Services: Transportation Services  Anticipated Discharge DME: None    Patient/family educated on Medicare website which has current facility and service quality ratings:    Education Provided on the Discharge Plan:    Patient/Family in Agreement with the Plan: unable to assess    Referrals Placed by CM/SW:    Private pay costs discussed: Not applicable    Additional Information:  Writer received consult for possible hospice placement for patient. Writer called patients spouse Alexsandra who was very overwhelmed at the time. Alexsandra was trying to coordinate to see how patient could get to  office to see if  feels patient has 6 months or less so she could get him signed on to hospice. Writer explained that patient does not need to see  for that and could discharge right with hospice from the hospital we just need to see if patient qualifies. Alexsandra states that is what she wants and she wants him to go to [facility]. Alexsandra states she knows how much it costs and will gladly pay $650/day to make sure he is cared for and supported. Alexsandra was open to having [facility] look at patients chart to see if he qualifies and see if they could accept him. Writer faxed referral to [facility] and called Virgilio to let him know it is a chart review.     [facility] called to let SW know that patient is appropriate based on a quick chart review. Writer faxed referral to [facility].       LINDSAY Salazar

## 2023-01-03 NOTE — PLAN OF CARE
Physical Therapy Discharge Summary    Reason for therapy discharge:    Change in goals of care.    Progress towards therapy goal(s). See goals on Care Plan in [facility] electronic health record for goal details.  Goals partially met.      Therapy recommendation(s):    Per chart, patient's spouse preferring patient discharge to [facility] on hospice. [facility] called to let SW know that patient is appropriate based on a quick chart review.   Goals of care no longer restorative, will complete IP PT orders.  Defer to medical team recommendations for discharge.

## 2023-01-03 NOTE — PROVIDER NOTIFICATION
"MD Notification    Notified Person: MD    Notified Person Name: Iram    Notification Date/Time: 01/03/23 7796    Notification Interaction: Pager    Purpose of Notification: Pt still very sleepy, unable to arouse/open eyes. Pt has not taken am meds yet. BG WNL. /109. Please advise.     Orders Received:     Comments: Continue to monitor. Per MD \"He is sleeping because of medications and they have to get out of his system\".         "

## 2023-01-03 NOTE — PLAN OF CARE
Goal Outcome Evaluation:      Orientation/Cognitive: alert to self  Observation Goals (Met/ Not Met): not met  Mobility Level/Assist Equipment: up Ax1 walker/ GB, if pt using bathroom should be up Ax2 walker/GB  Fall Risk (Y/N): yes  Behavior Concerns: green  Pain Management: denies, using PAINAD for dementia  Tele/VS/O2: VSS except for elevated BP, scheduled medication provided  ABNL Lab/BG:    Diet: mod carb  Bowel/Bladder: incontinent  Skin Concerns: scattered bruising, skin tear on left arm,  Drains/Devices: PIV is sl  Tests/Procedures for next shift: SW following,   Anticipated DC date & active delays: TBD  Patient Stated Goal for Today: KIERA    Seizure precautions in place, received seroquel 12.5mg

## 2023-01-03 NOTE — PROGRESS NOTES
Orientation/Cognitive: Disoriented to place, time and situation. Pt has been sleeping all day and just woke up, A&OX1. Able to perform a quick neuro, pt was able to participate then fell asleep again.   Observation Goals (Met/ Not Met): Not met  Mobility Level/Assist Equipment: AX1 GBW  Fall Risk (Y/N): Y  Behavior Concerns: None  Pain Management: KIERA  Tele/VS/O2: VSS except for BP, on RA  ABNL Lab/B, 144,136  Diet: Mod carb  Bowel/Bladder: Incontinent  Skin Concerns: Scattered bruising and skin tear on L arm  Drains/Devices: PIV SL  Tests/Procedures for next shift:   Anticipated DC date & active delays: TBD  Patient Stated Goal for Today: KIERA

## 2023-01-03 NOTE — PROGRESS NOTES
Care Management Follow Up    Length of Stay (days): 0    Expected Discharge Date: 01/04/2023     Concerns to be Addressed: discharge planning     Patient plan of care discussed at interdisciplinary rounds: Yes    Anticipated Discharge Disposition: Transitional Care, Other (Comments)     Anticipated Discharge Services: Transportation Services  Anticipated Discharge DME: None    Patient/family educated on Medicare website which has current facility and service quality ratings:    Education Provided on the Discharge Plan:    Patient/Family in Agreement with the Plan: unable to assess    Referrals Placed by CM/SW:    Private pay costs discussed: Not applicable    Additional Information:  Call placed to [facility] regarding referral. Per DOD, the fax failed to go through. Writer asked if they had received anything and was told the DON left for the day and requested that SW call back in the morning. Writer resent via DOD and SW will follow up as requested.      LINDSAY Eddy

## 2023-01-04 NOTE — PROGRESS NOTES
[facility]    HOSPITALIST PROGRESS NOTE :   --------------------------------------------------    Date of Admission:  12/31/2022    Cumulative Summary: Ernie Leary is a 89 year old male who was admitted on 12/31/2022 with generalized weakness     Assessment & Plan     Generalized weakness  Altered mental status with history of dementia, suspect delirium  Rule out UTI and viral infection  P/w generalized weakness from home with a fall  Workup largely unremarkable, with normal labs, normal head ct, and CT CAP without obvious etiology  Wife apparently has symptoms of viral gastroenteritis  Wide differential diagnosis possible in a patient nearing 89 years old with dementia including infection (URI, UTI), stroke (grossly nonfocal, head CT negative)  UA and viral panel unrevealing   procalcitonin undetectable      - unclear what precipitated this, but suspect his fall and time struggling on ground was start of delirium episode, no clear other source/cause   -Patient care was assumed this morning, patient was seen and examined, wife also present at the bedside  -Also discussed the care with bedside nursing, patient did have a RRT response was called in last evening due to the concern for confusion but later did have a restful night.  -So far patient has not developed any symptoms of gastroenteritis but will continue to monitor patient closely.  - On 01/02/23 Wife was present at the bedside and did have concerns regarding patient current clinical status, she told me that patient clinial status has been declining since the last 6 months but now since last week he is not even able to follow simple commands like getting out of the bed and going to the bathroom and is not even recognizing her which she was able to do it last week.  - 01/02 , started patient on scheduled Seroquel 12.5 mg at bedtime.  - 01/03;  increased to 25 mg scheduled at bedtime     -At this time wife does not want to proceed  with aggressive interventions.  - Wife inquired about option of hospice care at the time of discharge, consulted  to evaluate if patient does qualify for hospice services.  - Continue delirium management.  -Once patient is accepted to hospice care for outpatient settings, will probably stop most of his medications with plan to discharge patient to outpatient hospice facility.  - are following closely, awaiting response from hospice facility when bed would be available      Essential Hypertension:    -- Continue as needed hydralazine which is available  -- Continue patient on oral Norvasc, lisinopril and metoprolol at this time.  -- As patient is accepted into hospice facility, might discontinue these oral medications on discharge and focus on comfort to decrease pill burden     Widespread foraminal stenosis  --Physical therapy has been consulted, patient has been recommended to be discharged to either TCU versus long-term care facility     Pancreatic cysts  Mesenteric calcification  Seen incidentally on CT  - Follow-up PCP to determine if further work-up is desired.  Not recommended given advanced age and progressive dementia.     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 Alcantar note 11/21/2022)    -- there is report he was weaned off of keppra  -- note patient is high risk for hemorrhage with antiplatelet or anticoagulant with his amyloid angiopathy    Clinically Significant Risk Factors Present on Admission                  # Hypertension: home medication list includes antihypertensive(s)     # DMII: A1C = 6.9 % (Ref range: 0.0 - 5.6 %) within past 3 months            Diet: Moderate Consistent Carb (60 g CHO per Meal) Diet    Mclaughlin Catheter: Not present  DVT Prophylaxis: Pneumatic Compression Devices  Code Status: No CPR- Do NOT Intubate    The patient's care was discussed with the  Bedside Nurse, Care Coordinator/, Patient and Patient's Family.    Disposition Plan     Expected Discharge Date: 01/04/2023      Destination: home;home with help/services;inpatient rehabilitation facility;other (comment) (wife is thinking about [facility] as an option (private pay) vs TCU)  Discharge Comments: PT Consult.  SW to discuss hospice with the wife.     Entered: Chela Walden MD 01/04/2023, 7:33 AM   Tentative discharge to facility with hospice care tomorrow if bed is available        Medical Decision Making       35 MINUTES SPENT BY ME on the date of service doing chart review, history, exam, documentation & further activities per the note.      Chela Walden MD, MD, FACP  Text Page (7am - 6pm)    ----------------------------------------------------------------------------------------------------------------------      Interval History      Patient was seen and examined this morning, was initially sleeping, woke up on my interaction, denying any pain, was calm and cooperative at the time of my interaction, not requiring any sitter.    Having some oral intake, refuses medication sometimes.  Discussed the plan of care with bedside nursing to focus on patient comfort and only administer the medications if patient can take it comfortably     -Data reviewed today: I reviewed all new labs and imaging results over the last 24 hours.    I personally reviewed no images or EKG's today.    Physical Exam   Temp: 98.4  F (36.9  C) Temp src: Axillary BP: (!) 146/93 Pulse: 73   Resp: 16 (pt sleeping) SpO2: 95 % O2 Device: None (Room air)    There were no vitals filed for this visit.  Vital Signs with Ranges  Temp:  [97.6  F (36.4  C)-98.4  F (36.9  C)] 98.4  F (36.9  C)  Pulse:  [73-98] 73  Resp:  [16-18] 16  BP: (146-175)/() 146/93  SpO2:  [95 %-100 %] 95 %  I/O last 3 completed shifts:  In: 360 [P.O.:360]  Out: 510 [Urine:510]    GENERAL: Alert , awake but confused, oriented to person only,  NAD. Conversational, appropriate.   HEENT: Normocephalic. EOMI. No icterus or injection. Nares normal.   LUNGS: Clear to auscultation. No dyspnea at rest.   HEART: Regular rate. Extremities perfused.   ABDOMEN: Soft, nontender, and nondistended. Positive bowel sounds.   EXTREMITIES: No LE edema noted.   NEUROLOGIC: Moves extremities x4 on command. No acute focal neurologic abnormalities noted.     Medications       amLODIPine  10 mg Oral Daily     escitalopram  5 mg Oral Daily     insulin aspart  1-7 Units Subcutaneous TID AC     insulin aspart  1-5 Units Subcutaneous At Bedtime     lisinopril  2.5 mg Oral Daily     metoprolol tartrate  25 mg Oral BID     QUEtiapine  25 mg Oral At Bedtime     sodium chloride (PF)  3 mL Intracatheter Q8H       Data   Recent Labs   Lab 01/03/23  2146 01/03/23  1652 01/03/23  1351 01/01/23  0846 01/01/23  0610 12/31/22  1743 12/31/22  1112   WBC  --   --   --   --  7.8  --  9.4   HGB  --   --   --   --  12.3*  --  13.0*   MCV  --   --   --   --  90  --  91   PLT  --   --   --   --  230  --  250   NA  --   --   --   --  135  --  137   POTASSIUM  --   --   --   --  3.7  --  3.7   CHLORIDE  --   --   --   --  99  --  100   CO2  --   --   --   --  29  --  27   BUN  --   --   --   --  20  --  15   CR  --   --   --   --  1.01  --  0.88   ANIONGAP  --   --   --   --  7  --  10   SOCORRO  --   --   --   --  9.3  --  9.2   * 136* 144*   < > 134*   < > 146*   ALBUMIN  --   --   --   --   --   --  3.8   PROTTOTAL  --   --   --   --   --   --  7.2   BILITOTAL  --   --   --   --   --   --  1.2   ALKPHOS  --   --   --   --   --   --  63   ALT  --   --   --   --   --   --  17   AST  --   --   --   --   --   --  22    < > = values in this interval not displayed.       Imaging:   No results found for this or any previous visit (from the past 24 hour(s)).

## 2023-01-04 NOTE — PROGRESS NOTES
Observation goals  PRIOR TO DISCHARGE         -diagnostic tests and consults completed and resulted:  Not met    -vital signs normal or at patient baseline: Not met, BP elevated 160's    Nurse to notify provider when observation goals have been met and patient is ready for discharge.

## 2023-01-04 NOTE — PROGRESS NOTES
MD Notification    Notified Person: MD    Notified Person Name: angie    Notification Date/Time:1/3/2023 0290    Notification Interaction: via text    Purpose of Notification: bladder scan for 53. Order for straight cath    Orders Received: straight cath   Comments:

## 2023-01-04 NOTE — PLAN OF CARE
Goal Outcome Evaluation      Orientation/Cognitive: alert to self, pt able to participate at the beginning of shift, wide awake, able to swallow scheduled medication.  Observation Goals (Met/ Not Met): Not met  Mobility Level/Assist Equipment: Ax2 walker and GB  Fall Risk (Y/N): yes  Behavior Concerns: green  Pain Management: denies pain, using PAINAD for pt with dementia  Tele/VS/O2: VSS, afebrile on RA, BP elevated - improved with scheduled medication  ABNL Lab/BG:  - 166   Diet: mod Carb  Bowel/Bladder: incontinent of B&B, bladderscan 80 ml  Skin Concerns: scattered bruising and skin tears to the left upper and lower forearm  Drains/Devices: PIV is sl'd  Tests/Procedures for next shift: SW is following closely for hospice placement   Anticipated DC date & active delays: TBD,   Patient Stated Goal for Today: KIERA    Additional information: wife Alexsandra will come around 9am to visit. See SW note that pt qualifies for hospice care

## 2023-01-04 NOTE — PROGRESS NOTES
Care Management Follow Up    Length of Stay (days): 0    Expected Discharge Date: 01/05/2023     Concerns to be Addressed: discharge planning     Patient plan of care discussed at interdisciplinary rounds: Yes    Anticipated Discharge Disposition: Transitional Care, Other (Comments)     Anticipated Discharge Services: Transportation Services  Anticipated Discharge DME: None    Patient/family educated on Medicare website which has current facility and service quality ratings:    Education Provided on the Discharge Plan:    Patient/Family in Agreement with the Plan: unable to assess    Referrals Placed by CM/SW:    Private pay costs discussed: Not applicable    Additional Information:  Writer talked with patients spouse who states that she met with [facility] last night and signed all the paperwork. Writer explained we are still waiting to hear from [facility] if [facility] have a bed or not. SW explained we would let her know once we hear from them.     Writer called [facility] who states they are full and he is on a list but the DON will call [facility] later today with an update.      LINDSAY Salazar

## 2023-01-04 NOTE — PROGRESS NOTES
Orientation/Cognitive: Disoriented to place, time and situation. Pt has been sleeping all day, was partially awake and had some jello and pudding. A&OX1. Was able to perform a quick neuro right after he had some jello.  Observation Goals (Met/ Not Met): Not met  Mobility Level/Assist Equipment: AX1 GBW  Fall Risk (Y/N): Y  Behavior Concerns: None  Pain Management: KIERA  Tele/VS/O2: VSS except for BP, on RA  ABNL Lab/B, 159, 169  Diet: Mod carb  Bowel/Bladder: Incontinent  Skin Concerns: Scattered bruising and skin tear on L arm  Drains/Devices: PIV SL  Tests/Procedures for next shift:   Anticipated DC date & active delays: TBD, pending hospice placement  Patient Stated Goal for Today: KIERA

## 2023-01-04 NOTE — PROGRESS NOTES
Observation goals      -diagnostic tests and consults completed and resulted: Not met     -vital signs normal or at patient baseline: Not met     Nurse to notify provider when observation goals have been met and patient is ready for discharge.

## 2023-01-04 NOTE — PROGRESS NOTES
Observation goals  PRIOR TO DISCHARGE         -diagnostic tests and consults completed and resulted:  Not met    -vital signs normal or at patient baseline: Not met, BP elevated, improving with medication.    Nurse to notify provider when observation goals have been met and patient is ready for discharge.

## 2023-01-04 NOTE — PROGRESS NOTES
Observation goals     -diagnostic tests and consults completed and resulted: Not met     -vital signs normal or at patient baseline: Not met, elevated BP      Nurse to notify provider when observation goals have been met and patient is ready for discharge.

## 2023-01-04 NOTE — PROVIDER NOTIFICATION
While on cross cover this evening I was paged by RN regarding patient's bladder scan >500cc. Straight cath orders placed.    London Guajardo MD, MPH  Internal Medicine

## 2023-01-05 NOTE — PROGRESS NOTES
Care Management Follow Up    Length of Stay (days): 0    Expected Discharge Date: 01/05/2023     Concerns to be Addressed: discharge planning     Patient plan of care discussed at interdisciplinary rounds: Yes    Anticipated Discharge Disposition: Transitional Care, Other (Comments)     Anticipated Discharge Services: Transportation Services  Anticipated Discharge DME: None    Patient/family educated on Medicare website which has current facility and service quality ratings:    Education Provided on the Discharge Plan:    Patient/Family in Agreement with the Plan: unable to assess    Referrals Placed by CM/SW:    Private pay costs discussed: Not applicable    Additional Information:  Writer called [facility] and spoke with Freida. Lisandra states that right now they are looking at a bed opening for next week but if anything changes she would call us back. SHIMON will discuss options with patients spouse. [facility] no longer takes hospice patients. Writer will see if [facility] would be able to take patient, or [place] if spouse is open to it.     Addendum: Writer received a call from Karine from [facility] who states that patients spouse reached out to them and she may want him to go there. Karine states they would have a care suites opening and could have an RN come assess this afternoon. Karine requested H&P, Med List and face sheet to be faxed to [phone]. Information faxed      LINDSAY Salazar

## 2023-01-05 NOTE — PLAN OF CARE
Goal Outcome Evaluation:      Plan of Care Reviewed With: patient, spouse     Orientation/Cognitive: A/O to self only. Up in chair for meals. Pt was agitated after his wife left this evening. Seroquel prn given.   Observation Goals (Met/ Not Met): not met  Mobility Level/Assist Equipment: up with 2 person assist walker/GB. Up in chair for meals  Fall Risk (Y/N): yes  Behavior Concerns: flat affect. Screams  out when providing cares  Pain Management: tylenol for pain   Tele/VS/O2: VSS, RA  ABNL Lab/BG: none  Diet: mod carb  Bowel/Bladder: incontinence of bowel and bladder   Skin Concerns: bruises, scabs all over arms and legs  Drains/Devices: PIV  Tests/Procedures for next shift: none  Anticipated DC date & active delays: tomorrow at noon to [facility]   Patient Stated Goal for Today: unable to say

## 2023-01-05 NOTE — PROGRESS NOTES
[facility]                          18 MONTH WELL CHILD EXAM   Concha is a 18 m.o.female     History given by Mother    CONCERNS/QUESTIONS: No     IMMUNIZATION: up to date and documented      NUTRITION, ELIMINATION, SLEEP, SOCIAL      NUTRITION HISTORY:   Vegetables? Yes  Fruits? Yes  Meats? Yes  Juice? Yno  Water? Yes  Milk? Yes, Type:  Whole, 16 oz a day max  Allowing to self feed? Yes    ELIMINATION:   Has ample wet diapers per day and BM is soft.     SLEEP PATTERN:   Night time feedings :no  Sleeps through the night? Yes  Sleeps in crib or bed? Yes  Sleeps with parent? No    SOCIAL HISTORY:   The patient lives at home with mother, father, 1 sister, 3 brother, and does not attend day care. Has 3 siblings.  Smokers at home? No    HISTORY     Patients medications, allergies, past medical, surgical, social and family histories were reviewed and updated as appropriate.    Past Medical History:   Diagnosis Date   • Term birth of female       Patient Active Problem List    Diagnosis Date Noted   • Macrocephaly 2021   • Gross motor delay 2021     No past surgical history on file.  Family History   Problem Relation Age of Onset   • No Known Problems Mother    • No Known Problems Father    • Other Brother         brain tumor     Current Outpatient Medications   Medication Sig Dispense Refill   • ibuprofen (MOTRIN) 100 MG/5ML Suspension Take 10 mg/kg by mouth every 6 hours as needed.     • amoxicillin (AMOXIL) 400 MG/5ML suspension Take 6.9 mL by mouth 2 times a day for 10 days. 138 mL 0   • polyethylene glycol 3350 (MIRALAX) 17 GM/SCOOP Powder Take 8.5 g by mouth every day. 507 g 3   • sodium fluoride (LURIDE) 0.55 (0.25 F) MG per chewable tablet Chew 1 Tablet every day. 30 Tablet 6   • acetaminophen (TYLENOL) 160 MG/5ML Suspension Take 15 mg/kg by mouth every four hours as needed.       No current facility-administered medications for this visit.     No Known  Care Management Follow Up    Length of Stay (days): 0    Expected Discharge Date: 01/06/2023     Concerns to be Addressed: discharge planning     Patient plan of care discussed at interdisciplinary rounds: Yes    Anticipated Discharge Disposition: Transitional Care, Other (Comments)     Anticipated Discharge Services: Transportation Services  Anticipated Discharge DME: None    Patient/family educated on Medicare website which has current facility and service quality ratings:    Education Provided on the Discharge Plan:    Patient/Family in Agreement with the Plan: unable to assess    Referrals Placed by CM/SW:    Private pay costs discussed: Not applicable    Additional Information:  Patient will be able to discharge to [facility] tomorrow at noon via [facility] due to hospice status and confusion. Writer updated MACI Armas from [facility] as well as Virgilio from [facility] on discharge time. Per Virgilio, 3 day supply of hospice medications is not needed at discharge. Patient will just need hospice eval and treat orders as well as the signed orders for the [facility]. SHIMON clayton MD with an update      LINDSAY Salazar         "Allergies    REVIEW OF SYSTEMS      Constitutional: Afebrile, good appetite, alert.  HENT: No abnormal head shape, no congestion, no nasal drainage.   Eyes: Negative for any discharge in eyes, appears to focus, no crossed eyes.  Respiratory: Negative for any difficulty breathing or noisy breathing.   Cardiovascular: Negative for changes in color/activity.   Gastrointestinal: Negative for any vomiting or excessive spitting up, constipation or blood in stool.   Genitourinary: Ample amount of wet diapers.   Musculoskeletal: Negative for any sign of arm pain or leg pain with movement.   Skin: Negative for rash or skin infection.  Neurological: Negative for any weakness or decrease in strength.     Psychiatric/Behavioral: Appropriate for age.     SCREENINGS   Structured Developmental Screen:  ASQ- Above cutoff in all domains: Yes     MCHAT: Pass    ORAL HEALTH:   Primary water source is deficient in fluoride? yes  Oral Fluoride Supplementation recommended? yes  Cleaning teeth twice a day, daily oral fluoride? yes  Established dental home? Yes    SENSORY SCREENING:   Hearing: Risk Assessment low risk  Vision: Risk Assessment low risk    LEAD RISK ASSESSMENT:    Does your child live in or visit a home or  facility with an identified  lead hazard or a home built before  that is in poor repair or was  renovated in the past 6 months? No    SELECTIVE SCREENINGS INDICATED WITH SPECIFIC RISK CONDITIONS:   ANEMIA RISK: No  (Strict Vegetarian diet? Poverty? Limited food access?)    BLOOD PRESSURE RISK: No  ( complications, Congenital heart, Kidney disease, malignancy, NF, ICP, Meds)    OBJECTIVE      PHYSICAL EXAM  Reviewed vital signs and growth parameters in EMR.     Pulse 136   Temp 36.9 °C (98.4 °F) (Temporal)   Resp 40   Ht 0.889 m (2' 11\")   Wt 12.1 kg (26 lb 12.2 oz)   HC 49.5 cm (19.49\")   BMI 15.36 kg/m²   Length - >99 %ile (Z= 2.65) based on WHO (Girls, 0-2 years) Length-for-age data based on " Length recorded on 3/1/2022.  Weight - 90 %ile (Z= 1.29) based on WHO (Girls, 0-2 years) weight-for-age data using vitals from 3/1/2022.  HC - 99 %ile (Z= 2.30) based on WHO (Girls, 0-2 years) head circumference-for-age based on Head Circumference recorded on 3/1/2022.    GENERAL: This is an alert, active child in no distress.   HEAD: Normocephalic, atraumatic. Anterior fontanelle is open, soft and flat.  EYES: PERRL, positive red reflex bilaterally. No conjunctival infection or discharge.   EARS: TM’s are transparent with good landmarks. Canals are patent.  NOSE: Nares are patent and free of congestion.  THROAT: Oropharynx has no lesions, moist mucus membranes, palate intact. Pharynx without erythema, tonsils normal.   NECK: Supple, no lymphadenopathy or masses.   HEART: Regular rate and rhythm without murmur. Pulses are 2+ and equal.   LUNGS: Clear bilaterally to auscultation, no wheezes or rhonchi. No retractions, nasal flaring, or distress noted.  ABDOMEN: Normal bowel sounds, soft and non-tender without hepatomegaly or splenomegaly or masses.   GENITALIA: Normal female genitalia. normal external genitalia, no erythema, no discharge.  MUSCULOSKELETAL: Spine is straight. Extremities are without abnormalities. Moves all extremities well and symmetrically with normal tone.    NEURO: Active, alert, oriented per age.    SKIN: Intact without significant rash or birthmarks. Skin is warm, dry, and pink.     ASSESSMENT AND PLAN     1. Well Child Exam:  Healthy 18 m.o. old with good growth and development.   Anticipatory guidance was reviewed and age appropriate Bright Futures handout provided.  2. Return to clinic for 24 month well child exam or as needed.  3. Immunizations given today: Hep A.  4. Vaccine Information statements given for each vaccine if administered. Discussed benefits and side effects of each vaccine with patient/family, answered all patient/family questions.   5. See Dentist yearly.  6. Multivitamin  with 400iu of Vitamin D po daily if indicated.  7. Safety Priority: Car safety seats, poisoning, sun protection, firearm safety, safe home environment.

## 2023-01-05 NOTE — PROGRESS NOTES
[facility]    HOSPITALIST PROGRESS NOTE :   --------------------------------------------------    Date of Admission:  12/31/2022    Cumulative Summary: Ernie Leary is a 89 year old male who was admitted on 12/31/2022 with generalized weakness     Assessment & Plan     Generalized weakness  Altered mental status with history of dementia, suspect delirium  Rule out UTI and viral infection  P/w generalized weakness from home with a fall  Workup largely unremarkable, with normal labs, normal head ct, and CT CAP without obvious etiology  Wife apparently has symptoms of viral gastroenteritis  Wide differential diagnosis possible in a patient nearing 89 years old with dementia including infection (URI, UTI), stroke (grossly nonfocal, head CT negative)  UA and viral panel unrevealing   procalcitonin undetectable      - unclear what precipitated this, but suspect his fall and time struggling on ground was start of delirium episode, no clear other source/cause   -Patient care was assumed this morning, patient was seen and examined, wife also present at the bedside  -Also discussed the care with bedside nursing, patient did have a RRT response was called in last evening due to the concern for confusion but later did have a restful night.  -So far patient has not developed any symptoms of gastroenteritis but will continue to monitor patient closely.  - On 01/02/23 Wife was present at the bedside and did have concerns regarding patient current clinical status, she told me that patient clinial status has been declining since the last 6 months but now since last week he is not even able to follow simple commands like getting out of the bed and going to the bathroom and is not even recognizing her which she was able to do it last week.  - 01/02 , started patient on scheduled Seroquel 12.5 mg at bedtime.  - 01/03;  increased to 25 mg scheduled at bedtime     -At this time wife does not want to proceed  with aggressive interventions.  - Wife inquired about option of hospice care at the time of discharge, consulted  to evaluate if patient does qualify for hospice services.  - Continue delirium management.  -Plan to discharge patient tomorrow to facility with hospice services     Essential Hypertension:    -- Continue as needed hydralazine which is available  -- Continue patient on oral Norvasc, lisinopril and metoprolol at this time.  -- As patient is accepted into hospice facility, might discontinue these oral medications on discharge and focus on comfort to decrease pill burden     Widespread foraminal stenosis  --Physical therapy has been consulted, patient has been recommended to be discharged to either TCU versus long-term care facility     Pancreatic cysts  Mesenteric calcification  Seen incidentally on CT  - Follow-up PCP to determine if further work-up is desired.  Not recommended given advanced age and progressive dementia.     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 Alcantar note 11/21/2022)    -- there is report he was weaned off of keppra  -- note patient is high risk for hemorrhage with antiplatelet or anticoagulant with his amyloid angiopathy    Clinically Significant Risk Factors Present on Admission                  # Hypertension: home medication list includes antihypertensive(s)     # DMII: A1C = 6.9 % (Ref range: 0.0 - 5.6 %) within past 3 months            Diet: Moderate Consistent Carb (60 g CHO per Meal) Diet    Mclaughlin Catheter: Not present  DVT Prophylaxis: Pneumatic Compression Devices  Code Status: No CPR- Do NOT Intubate    The patient's care was discussed with the Bedside Nurse, Care Coordinator/, Patient and Patient's Family.    Disposition Plan      Expected Discharge Date: 01/06/2023, 12:00 PM    Destination: home;home with help/services;inpatient rehabilitation  facility;other (comment) ([facility] is thinking about [facility] as an option (private pay) vs TCU)  Discharge Comments: PT Consult.  SW to discuss hospice with the wife.  [facility]    GIP  candidate?     Entered: Chela Walden MD 01/05/2023, 5:49 PM   Tentative discharge to facility with hospice care tomorrow if bed is available        Medical Decision Making       35 MINUTES SPENT BY ME on the date of service doing chart review, history, exam, documentation & further activities per the note.      Chela Walden MD, MD, FACP  Text Page (7am - 6pm)    ----------------------------------------------------------------------------------------------------------------------      Interval History      Patient was seen and examined earlier this morning, was initially sleeping, was cooperative on examination, denying any pain, was calm, has not been requiring any sitter.  Wife was not present at the time of my evaluation.    -Data reviewed today: I reviewed all new labs and imaging results over the last 24 hours.    I personally reviewed no images or EKG's today.    Physical Exam   Temp: 98.4  F (36.9  C) Temp src: Oral BP: 120/67 Pulse: 56   Resp: 20 SpO2: 95 % O2 Device: None (Room air)    There were no vitals filed for this visit.  Vital Signs with Ranges  Temp:  [97.4  F (36.3  C)-98.4  F (36.9  C)] 98.4  F (36.9  C)  Pulse:  [56-92] 56  Resp:  [16-20] 20  BP: (120-170)/(67-98) 120/67  SpO2:  [94 %-95 %] 95 %  I/O last 3 completed shifts:  In: 620 [P.O.:620]  Out: -     GENERAL: Alert , awake but confused, oriented to person only, NAD. Conversational, appropriate.   HEENT: Normocephalic. EOMI. No icterus or injection. Nares normal.   LUNGS: Clear to auscultation. No dyspnea at rest.   HEART: Regular rate. Extremities perfused.   ABDOMEN: Soft, nontender, and nondistended. Positive bowel sounds.   EXTREMITIES: No LE edema noted.   NEUROLOGIC: Moves extremities x4 on command. No acute focal neurologic abnormalities  noted.     Medications       amLODIPine  10 mg Oral Daily     escitalopram  5 mg Oral Daily     insulin aspart  1-7 Units Subcutaneous TID AC     insulin aspart  1-5 Units Subcutaneous At Bedtime     lisinopril  2.5 mg Oral Daily     metoprolol tartrate  25 mg Oral BID     QUEtiapine  25 mg Oral At Bedtime     sodium chloride (PF)  3 mL Intracatheter Q8H       Data   Recent Labs   Lab 01/05/23  1645 01/05/23  1258 01/05/23  0840 01/01/23  0846 01/01/23  0610 12/31/22  1743 12/31/22  1112   WBC  --   --   --   --  7.8  --  9.4   HGB  --   --   --   --  12.3*  --  13.0*   MCV  --   --   --   --  90  --  91   PLT  --   --   --   --  230  --  250   NA  --   --   --   --  135  --  137   POTASSIUM  --   --   --   --  3.7  --  3.7   CHLORIDE  --   --   --   --  99  --  100   CO2  --   --   --   --  29  --  27   BUN  --   --   --   --  20  --  15   CR  --   --   --   --  1.01  --  0.88   ANIONGAP  --   --   --   --  7  --  10   SOCORRO  --   --   --   --  9.3  --  9.2   * 259* 144*   < > 134*   < > 146*   ALBUMIN  --   --   --   --   --   --  3.8   PROTTOTAL  --   --   --   --   --   --  7.2   BILITOTAL  --   --   --   --   --   --  1.2   ALKPHOS  --   --   --   --   --   --  63   ALT  --   --   --   --   --   --  17   AST  --   --   --   --   --   --  22    < > = values in this interval not displayed.       Imaging:   No results found for this or any previous visit (from the past 24 hour(s)).

## 2023-01-05 NOTE — PROGRESS NOTES
Observation Goals:    -diagnostic tests and consults completed and resulted - in progress    -vital signs normal or at patient baseline - vitally stable and on room air

## 2023-01-05 NOTE — PLAN OF CARE
Orientation/Cognitive: Alert to self only, Neuro checks  Observation Goals (Met/ Not Met): Not Met  Mobility Level/Assist Equipment: A1 GB W  Fall Risk (Y/N): Y  Behavior Concerns: None  Pain Management: Denies  Tele/VS/O2: VSS on RA  ABNL Lab/BG: , covered with sliding scale insulin  Diet: Mod Carb  Bowel/Bladder: Incontinent  Skin Concerns: Scattered bruising and skin tear on L arm  Drains/Devices: PIV SL  Tests/Procedures for next shift: BG monitoring    Anticipated DC date & active delays: TBD, pending hospice placement  Patient Stated Goal for Today: KIERA    Observation goals   -diagnostic tests and consults completed and resulted: Not Met  -vital signs normal or at patient baseline: Not Met     Nurse to notify provider when observation goals have been met and patient is ready for discharge.

## 2023-01-05 NOTE — PROGRESS NOTES
Shift: 6162-6209  Orientation/Cognitive: AO to self, able to follow to most commands.  Observation Goals (Met/ Not Met): awaiting hospice placement  Mobility Level/Assist Equipment: A2 GBW  Fall Risk (Y/N): yes  Behavior Concerns: calm and cooperative most of the time.  Pain Management: PAINAD assessment: 2  Tele/VS/O2: vitally stable and on room air; /80  ABNL Lab/BG:    Diet: moderate carb diet  Bowel/Bladder: incontinent bowel and bladder; bladder scanned at 3am: 99ml urine retention  Skin Concerns: large bruise on left hand and dry scab on left upper and lower extremities  Drains/Devices: PIV SL  Tests/Procedures for next shift: none  Anticipated DC date & active delays: TBD, awaiting hospice placement        Observation Goals:     -diagnostic tests and consults completed and resulted - in progress     -vital signs normal or at patient baseline - vitally stable and on room air

## 2023-01-06 NOTE — PLAN OF CARE
Observation Goals:  -diagnostic tests and consults completed and resulted - Met  -vital signs normal or at patient baseline - VSS on RA

## 2023-01-06 NOTE — PLAN OF CARE
Orientation/Cognitive: Alert to self only, able to follow most of commands  Observation Goals (Met/ Not Met): Not Met  Mobility Level/Assist Equipment: A2 GB W  Fall Risk (Y/N): Y  Behavior Concerns: Scream at times  Pain Management: Denies  Tele/VS/O2: VSS on RA  ABNL Lab/BG: , 193 covered with sliding scale insulin  Diet: Mod Carb  Bowel/Bladder: Incontinent  Skin Concerns: Scattered bruising and skin tear on L arm  Drains/Devices: PIV SL  Tests/Procedures for next shift: BG monitoring    Anticipated DC date & active delays: Discharge to [facility] today 1/6 at noon [facility] due to hospice status and confusion  Patient Stated Goal for Today: KIERA    Observation Goals:  -diagnostic tests and consults completed and resulted - Met  -vital signs normal or at patient baseline - VSS on RA

## 2023-01-06 NOTE — DISCHARGE SUMMARY
[facility]  Hospitalist Discharge Summary      Date of Admission:  12/31/2022  Date of Discharge:  1/6/2023  Discharging Provider: Chela Walden MD, FACP  Discharge Service: Hospitalist Service    Discharge Diagnoses   Generalized weakness  Delirium associated with dementia  Infection Ruled out   Essential Hypertension  Widespread foraminal stenosis  Pancreatic cysts  Mesenteric calcification  History of atrial flutter  History of seizure disorder  History of hemorrhagic stroke     Follow-ups Needed After Discharge   Follow-up Appointments     Follow Up and recommended labs and tests      Follow up with Nursing home Hospice physician and RN  No follow up labs   or test are needed.             Unresulted Labs Ordered in the Past 30 Days of this Admission     No orders found from 12/1/2022 to 1/1/2023.        Discharged to long-term care facility  Condition at discharge: Stable    Hospital Course     Cumulative Summary: Ernie Leary is a 89 year old male who was admitted on 12/31/2022 with generalized weakness Here are further details regarding his current hospitalization      Generalized weakness  Delirium associated with Advanced Dementia   Ruled out infection  Patient presented with generalized weakness from home with a fall, was not able to get up and required EMS to be called twice in 24 hours as he was unable to follow simple directions as coming out of the bed to use bathroom   Extensive workup was unremarkable, with normal labs, normal head ct, and CT CAP without obvious etiology  UA and viral panel unrevealing   procalcitonin undetectable     - unclear what precipitated this, but suspect his fall and time struggling on ground was start of delirium episode, no clear other source/cause   - Per wife , patient,s dementia has been worsening for the last 6 months but he wa able to follow simple commands but now he was unable to getup from the floor for 2 hours and then next day was unable  to leave bed and urinated in the bed twice   - Patient now does not even recognize his elderly wife who is his primary care giver and wife inquired about hospice care due to poor quality of life   - 01/02 , started patient on scheduled Seroquel 12.5 mg at bedtime.  - 01/03;  increased to 25 mg scheduled at bedtime    - Continue delirium management.  - Plan to discharge patient tomorrow to facility with hospice services, patient is able to take medications some days and some days refuses which is reasonable considering his hospice status   - Some of the home medications have been stopped to decrease pill burden    - At this time , patient is not requiring any pain medications or benzos during this stay       Essential Hypertension:     -- Continue patient on oral Norvasc, lisinopril and metoprolol at this time.  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 Alcantar note 11/21/2022)     -- there is report he was weaned off of keppra  -- noted that patient is high risk for hemorrhage with antiplatelet or anticoagulant with his amyloid angiopathy    Patient was seen and examined on the day of discharge , he is feeling well, does not have any complaints , remains pleasantly confused, I did review the discharge medications and instructions with the patient and plan for him to follow up with the Hospice MD after the hospitalization      Consultations This Hospital Stay   PHYSICAL THERAPY ADULT IP CONSULT  CARE MANAGEMENT / SOCIAL WORK IP CONSULT  SOCIAL WORK IP CONSULT  CARE MANAGEMENT / SOCIAL  WORK IP CONSULT    Code Status   No CPR- Do NOT Intubate    Time Spent on this Encounter   I, Chela Walden MD, personally saw the patient today and spent less than or equal to 30 minutes discharging this patient.       Chela Walden MD  [facility]  [address] [address]  Phone: [phone]  ______________________________________________________________________    Physical Exam   Vital Signs: Temp: 99.7  F (37.6  C) Temp src: Axillary BP: (!) 152/93 Pulse: 90   Resp: 20 SpO2: 93 % O2 Device: None (Room air)    Weight: 0 lbs 0 oz    Physical Exam  Vitals and nursing note reviewed.   Constitutional:       Appearance: He is well-developed.   HENT:      Head: Normocephalic and atraumatic.   Eyes:      Pupils: Pupils are equal, round, and reactive to light.   Neck:      Thyroid: No thyromegaly.   Cardiovascular:      Rate and Rhythm: Normal rate and regular rhythm.      Heart sounds: Normal heart sounds.   Pulmonary:      Effort: Pulmonary effort is normal. No respiratory distress.      Breath sounds: Normal breath sounds.   Abdominal:      General: Bowel sounds are normal. There is no distension.      Palpations: Abdomen is soft.   Musculoskeletal:         General: No tenderness. Normal range of motion.      Cervical back: Normal range of motion and neck supple.   Skin:     General: Skin is warm and dry.   Neurological:      Mental Status: He is alert. Mental status is at baseline. He is disoriented.   Psychiatric:         Behavior: Behavior normal.          Primary Care Physician   Forest Ashraf    Discharge Orders      Primary Care - Care Coordination Referral      General info for SNF    Length of Stay Estimate: Short Term Care: Estimated # of Days <30  Condition at Discharge: Improving  Level of care:skilled   Rehabilitation Potential: Good  Admission H&P remains valid and up-to-date: Yes  Recent Chemotherapy: N/A  Use Nursing Home Standing  Orders: Yes     Mantoux instructions    Give two-step Mantoux (PPD) Per Facility Policy Yes     Follow Up and recommended labs and tests    Follow up with Nursing home Hospice physician and RN  No follow up labs or test are needed.     Activity - Up with nursing assistance     Reason for your hospital stay    You were admitted to the hospital secondary to confusion and failure to thrive sec to advanced dementia     No CPR- Do NOT Intubate    Comfort /Hospice Care     Fall precautions     Diet    Follow this diet upon discharge: Orders Placed This Encounter  General diet       Significant Results and Procedures   Results for orders placed or performed during the hospital encounter of 12/31/22   CT Head w/o Contrast    Narrative    EXAM: CT HEAD W/O CONTRAST  LOCATION: [facility]  DATE/TIME: 12/31/2022 2:02 PM    INDICATION: Trauma, confusion.  COMPARISON: Brain MRI 06/26/2017.  TECHNIQUE: Routine CT Head without IV contrast. Multiplanar reformats. Dose reduction techniques were used.    FINDINGS:  INTRACRANIAL CONTENTS: No intracranial hemorrhage, extraaxial collection, or mass effect.  Chronic encephalomalacia within the right parietal lobe. Moderate presumed chronic small vessel ischemic changes. Moderate generalized volume loss. Ex vacuo   enlargement of the right lateral ventricle. No hydrocephalus.     VISUALIZED ORBITS/SINUSES/MASTOIDS: No intraorbital abnormality. No paranasal sinus mucosal disease. No middle ear or mastoid effusion.    BONES/SOFT TISSUES: No acute abnormality.      Impression    IMPRESSION:  1.  No acute intracranial process.   CT Cervical Spine w/o Contrast    Narrative    EXAM: CT CERVICAL SPINE W/O CONTRAST  LOCATION: [facility]  DATE/TIME: 12/31/2022 2:01 PM    INDICATION: Trauma, neck pain.  COMPARISON: None.  TECHNIQUE: Routine CT Cervical Spine without IV contrast. Multiplanar reformats. Dose reduction techniques were  used.    FINDINGS:  VERTEBRA: Normal alignment. Normal vertebral body heights. Moderate to severe disc space narrowing throughout cervical spine with marginal osteophytes. Facet arthropathy preferentially on the left at C4-C5. No fracture or posttraumatic subluxation.     CANAL/FORAMINA: Moderate to severe left foraminal stenosis at C2-C3. Moderate bilateral foraminal stenoses at C3-C4. Moderate to severe left and moderate right foraminal stenoses at C4-C5. Moderate to severe bilateral foraminal stenoses at C5-C6.    PARASPINAL: No extraspinal abnormality.      Impression    IMPRESSION:  1.  No fracture or posttraumatic subluxation.  2.  Spondylosis with foraminal stenoses as detailed.   CT Chest/Abdomen/Pelvis w Contrast    Narrative    EXAM: CT CHEST/ABDOMEN/PELVIS W CONTRAST  LOCATION: [facility]  DATE/TIME: 12/31/2022 1:59 PM    INDICATION: fall, confusion, neck pain, back pain, cant localize pain, restless  COMPARISON: None.  TECHNIQUE: CT scan of the chest, abdomen, and pelvis was performed following injection of IV contrast. Multiplanar reformats were obtained. Dose reduction techniques were used.   CONTRAST: 91mL Isovue 370        FINDINGS:   LUNGS AND PLEURA: The central airways are clear. Limited due to respiratory motion. Mild bilateral upper and lower lobar dependent opacities which likely reflect atelectasis. No pneumothorax. No pleural fluid.    MEDIASTINUM/AXILLAE: Prominent pericardial recess fluid. No mediastinal hematoma. No thoracic adenopathy. Mild cardiomegaly. No pericardial effusion. Tiny hiatal hernia. No evidence for traumatic aortic injury or pulmonary artery embolus.    CORONARY ARTERY CALCIFICATION: Severe.    HEPATOBILIARY: Hepatic atrophy predominantly involving the left lobe. Normal gallbladder and bile ducts.    PANCREAS: Diffuse atrophy. Small cysts in the head and uncinate process including a dominant 9 mm cyst. No dilatation of the main duct.    SPLEEN:  Normal.    ADRENAL GLANDS: Normal.    KIDNEYS/BLADDER: Malrotated right kidney. No hydronephrosis or significant hydroureter. Small left-sided bladder diverticulum.    BOWEL: Normal caliber small bowel without focal mural thickening. Normal appendix. Colonic diverticulosis without evidence of acute diverticulitis. Likely incidental 1.4 cm lower mesenteric calcification. No free air or free fluid.    LYMPH NODES: Normal.    VASCULATURE: Severe aortoiliac atherosclerosis. Moderate stenosis at the origin of the SMA. The central SMA and SMV are patent.    PELVIC ORGANS: Mild prostatomegaly. Questionable TURP.    MUSCULOSKELETAL: Severe age indeterminate but likely old L2 vertebral body compression fracture with 3 mm osseous retropulsion. Osseous demineralization. Spinal and pelvic degenerative changes.      Impression    IMPRESSION:  1.  No acute post traumatic findings within the chest, abdomen or pelvis.  2.  Severe age indeterminate but likely old L2 vertebral body compression fracture, assuming no focal lumbar spine pain.  3.  Multiple pancreatic head and uncinate process cysts with the largest measuring 9 mm. These can be followed per guidelines below.  4.  Likely incidental 1.4 cm lower mesenteric calcification. Recommend attention on follow-up.        REFERENCE:  Revisions of international consensus [place] guidelines for the management of IPMN of the pancreas. Pancreatology 2017;17(5):738-753.    Largest cyst less than 10 mm: CT or MRI/MRCP in 6 months and then every 2 years if no change.           Discharge Medications   Current Discharge Medication List      START taking these medications    Details   acetaminophen (TYLENOL) 325 MG tablet Take 2 tablets (650 mg) by mouth every 6 hours as needed for mild pain or other (and adjunct with moderate or severe pain or per patient request)    Associated Diagnoses: Fall, initial encounter; Generalized muscle weakness; Cerebral amyloid angiopathy (H); End of life care       melatonin 1 MG TABS tablet Take 1 tablet (1 mg) by mouth nightly as needed for sleep    Associated Diagnoses: Fall, initial encounter; Generalized muscle weakness; Cerebral amyloid angiopathy (H); End of life care      polyethylene glycol (MIRALAX) 17 g packet Take 17 g by mouth 2 times daily as needed (constipation)    Associated Diagnoses: End of life care      !! QUEtiapine (SEROQUEL) 25 MG tablet Take 1 tablet (25 mg) by mouth every 12 hours as needed    Associated Diagnoses: Fall, initial encounter; Generalized muscle weakness; Cerebral amyloid angiopathy (H); End of life care      !! QUEtiapine (SEROQUEL) 25 MG tablet Take 1 tablet (25 mg) by mouth At Bedtime    Associated Diagnoses: Fall, initial encounter; Generalized muscle weakness; Cerebral amyloid angiopathy (H); End of life care      senna-docusate (SENOKOT-S/PERICOLACE) 8.6-50 MG tablet Take 2 tablets by mouth 2 times daily    Associated Diagnoses: End of life care       !! - Potential duplicate medications found. Please discuss with provider.      CONTINUE these medications which have CHANGED    Details   amLODIPine (NORVASC) 10 MG tablet Take 1 tablet (10 mg) by mouth daily    Associated Diagnoses: Fall, initial encounter; Generalized muscle weakness; Cerebral amyloid angiopathy (H); End of life care         CONTINUE these medications which have NOT CHANGED    Details   co-enzyme Q-10 100 MG CAPS capsule Take 100 mg by mouth daily      escitalopram (LEXAPRO) 5 MG tablet Take 5 mg by mouth daily      metoprolol tartrate (LOPRESSOR) 50 MG tablet TAKE 1/2 TABLET BY MOUTH TWICE A DAY  Qty: 90 tablet, Refills: 3    Associated Diagnoses: Essential hypertension, benign      psyllium (METAMUCIL) 58.6 % POWD Take 1 teaspoonful by mouth 3 times daily as needed. Constipation.         STOP taking these medications       Cyanocobalamin 2500 MCG CHEW Comments:   Reason for Stopping:         fosinopril (MONOPRIL) 10 MG tablet Comments:   Reason for Stopping:          vitamin B complex with vitamin C (STRESS TAB) tablet Comments:   Reason for Stopping:         VITAMIN D-VITAMIN K PO Comments:   Reason for Stopping:             Allergies   Allergies   Allergen Reactions     Adrenalin [Epinephrine Hcl]      Hctz      hyponatremia

## 2023-01-06 NOTE — PROGRESS NOTES
Pt discharged at this time to [facility] facility with hospice care.  Wife present at the bedside.  [facility] providing transport via stretcher.  AVS given to wife and packet provided to transport.  No concerns at this time. Belongings sent with transport as well.

## 2023-01-06 NOTE — PROGRESS NOTES
Care Management Discharge Note    Discharge Date: 01/06/2023       Discharge Disposition: Hospice, Assisted Living    Discharge Services: None    Discharge DME: Bed, Wheelchair    Discharge Transportation: health plan transportation (may need ride set up for discharge)    Private pay costs discussed: transportation costs    PAS Confirmation Code:    Patient/family educated on Medicare website which has current facility and service quality ratings: yes    Education Provided on the Discharge Plan:    Persons Notified of Discharge Plans: Patients spouse   Patient/Family in Agreement with the Plan: unable to assess    Handoff Referral Completed: Yes    Additional Information:  Patient will be discharging to [facility] today with [facility] at 12pm. Transportation set through [facility] transport due to patient being confused and enrolling in hospice. PCS Completed and faxed. Writer faxed orders to [facility] via [employer] as well as Darrin Armas at [phone].     LINDSAY Salazar

## 2023-01-07 NOTE — PROGRESS NOTES
Connected Care Resource Center    Background: Transitional Care Management program identified per system criteria and reviewed by [facility] Care Resource Center team for possible outreach.    Assessment: Upon chart review, CCR Team member will not proceed with patient outreach related to this episode of Transitional Care Management program due to reason below:    [facility] TCU: Patient discharged to TCU/ARU/LTACH and is established within [facility] Primary [facility]. Referral created for Primary Care-Care Coordination program.    Plan: Transitional Care Management episode addressed appropriately per reason noted above.      Cindy Canseco MA  Connected Care Resource Center, [facility]    *Connected Care Resource Team does NOT follow patient ongoing. Referrals are identified based on internal discharge reports and the outreach is to ensure patient has an understanding of their discharge instructions.

## 2023-01-09 NOTE — PROGRESS NOTES
Clinic Care Coordination Contact  SHIMON reviewed chart.  Pt discharged from [facility] on hospice, and therefore not eligible for CCC program.  Pt receiving all services from hospice and [facility] providers.    Jillian Castaneda,  [facility]  Clinic Care Coordinator  [facility]  [phone]  [email or web address]

## (undated) DEVICE — EYE SOL BSS 500ML

## (undated) DEVICE — GLOVE PROTEXIS MICRO 6.0  2D73PM60

## (undated) DEVICE — EYE SHIELD PLASTIC

## (undated) DEVICE — LINEN TOWEL PACK X5 5464

## (undated) DEVICE — EYE KNIFE SLIT XSTAR VISITEC 2.5MM 45DEG BEVEL UP 373725

## (undated) DEVICE — EYE PACK BVI READYPAK KIT #3

## (undated) DEVICE — GLOVE PROTEXIS MICRO 7.0  2D73PM70

## (undated) DEVICE — EYE PACK CUSTOM ANTERIOR 30DEG TIP CENTURION PPK6682-04

## (undated) DEVICE — GLOVE PROTEXIS W/NEU-THERA 7.5  2D73TE75

## (undated) DEVICE — PACK CATARACT CUSTOM SO DALE SEY32CTFCX